# Patient Record
Sex: FEMALE | Race: WHITE | NOT HISPANIC OR LATINO | Employment: FULL TIME | URBAN - METROPOLITAN AREA
[De-identification: names, ages, dates, MRNs, and addresses within clinical notes are randomized per-mention and may not be internally consistent; named-entity substitution may affect disease eponyms.]

---
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
## 2018-04-02 ENCOUNTER — RADIANT APPOINTMENT (OUTPATIENT)
Dept: GENERAL RADIOLOGY | Facility: CLINIC | Age: 18
End: 2018-04-02
Attending: PHYSICIAN ASSISTANT
Payer: COMMERCIAL

## 2018-04-02 ENCOUNTER — OFFICE VISIT (OUTPATIENT)
Dept: PEDIATRICS | Facility: CLINIC | Age: 18
End: 2018-04-02
Payer: COMMERCIAL

## 2018-04-02 VITALS
BODY MASS INDEX: 35.26 KG/M2 | TEMPERATURE: 97.4 F | OXYGEN SATURATION: 100 % | RESPIRATION RATE: 20 BRPM | HEART RATE: 82 BPM | SYSTOLIC BLOOD PRESSURE: 119 MMHG | DIASTOLIC BLOOD PRESSURE: 76 MMHG | WEIGHT: 198 LBS

## 2018-04-02 DIAGNOSIS — R07.89 CHEST WALL PAIN: ICD-10-CM

## 2018-04-02 DIAGNOSIS — R07.89 CHEST WALL PAIN: Primary | ICD-10-CM

## 2018-04-02 PROCEDURE — 71046 X-RAY EXAM CHEST 2 VIEWS: CPT | Mod: FY

## 2018-04-02 PROCEDURE — 99213 OFFICE O/P EST LOW 20 MIN: CPT | Performed by: PHYSICIAN ASSISTANT

## 2018-04-02 ASSESSMENT — PAIN SCALES - GENERAL: PAINLEVEL: SEVERE PAIN (6)

## 2018-04-02 NOTE — PATIENT INSTRUCTIONS
Chest xray looks clear of pneumonia or consolidation on the right side.  No evidence of pneumothorax.  This is likely a chest wall pain and taking 600-800 mg of ibuprofen every 6-8 hours or Aleve 440 mg every 12 hours may help reduce the severity of discomfort.  Use ice or heat on the area if that is helpful.  Follow up if not improving in the next 7-10 days or if any worsening.      [facility]- Pediatric Department    If you have any questions regarding to your visit please contact:   Team [place]:   Clinic Hours Telephone Number   Dr. Juliet Brady APRN, CPNP  Anne Trejo PA-C, MS Christine Olsen, RN  Jenna Cazares,    7am - 7pm Mon - Thurs  7am - 5pm Fri [phone]    After hours and weekends, call [phone]   To make an appointment at any location anytime, please call [phone] or  [email or web address].   Pediatric Walk-in Clinic* 8:30am - 3pm  Mon- Fri    [facility]   8:00am - 7pm  Mon- Thurs  8:00am - 5:30 pm Friday  9am - 1pm Saturday [phone]   Urgent Care - [place]      Urgent [facility] [place], [place]       11pm-9pm Monday - Friday   9am-5pm Saturday - Sunday    5pm-9pm Monday - Friday  9am-5pm Saturday - Sunday [phone] - [place]      [phone] [facility]   *Pediatric Walk-In Clinic is available for children/adolescents age 0-21 for the following symptoms:  Cough/Cold symptoms   Rashes/Itchy Skin  Sore throat    Urinary tract infection  Diarrhea    Ringworm  Ear pain    Sinus infection  Fever     Pink eye       If your provider has ordered a CT, MRI, or ultrasound for you, please call to schedule:  Phillip bui, phone [phone], fax [phone]  [facility] radiology, [phone]    If you need a medication refill please contact your pharmacy.   Please allow 3 business days for your refills to be completed.  **For ADHD medication, patient will need a follow up clinic  "or Evisit at least every 3 months to obtain refills.**    Use [employer] (secure email communication and access to your chart) to send your primary care provider a message or make an appointment.  Ask someone on your Team how to sign up for [employer] or call the [employer] help line at [phone]  To view your child's test results online: Log into your own [employer] account, select your child's name from the tabs on the right hand side, select \"My medical record\" and select \"Test results\"  Do you have options for a visit without coming into the clinic?  [place] offers electronic visits (E-visits) and telephone visits for certain medical concerns as well as Zipnosis online.    E-visits via [employer]- generally incur a $35.00 fee.   Telephone visits- These are billed based on time spent (in 10-minute increments) on the phone with your provider.   5-10 minutes $30.00 fee   11-20 minutes $59.00 fee   21-30 minutes $85.00 fee  Zipnosis- $25.00 fee.  More information and link available on [email or web address] homepage.       "

## 2018-04-02 NOTE — PROGRESS NOTES
SUBJECTIVE:   Ximena Diaz is a 17 year old female who presents to clinic today  because of:    Chief Complaint   Patient presents with     URI     Health Maintenance     HPV, Menactra, DTAP        HPI  ENT/Cough Symptoms    Problem started: 1 days ago  Fever: no  Runny nose: YES  Congestion: no  Sore Throat: no  Cough: no  Eye discharge/redness:  no  Ear Pain: no  Wheeze: no   Sick contacts: None;  Strep exposure: None;  Therapies Tried: none  sharp pain in right side when she takes a deep breath wondering if she has an upper resp infection      Pain on the right side of her rib cage is causing her to have difficulty breathing.  She cannot take a large deep breath because of pain and discomfort.  She also has pain with regular breathing pattern.  Hurts to move around and touch that area.  She has a history of asthma as a younger child, but not for many years.  She has a runny nose.  No cough, no fever, no stomach pain.           ROS  Constitutional, eye, ENT, skin, respiratory, cardiac, and GI are normal except as otherwise noted.    PROBLEM LIST  Patient Active Problem List    Diagnosis Date Noted     Abdominal pain, left upper quadrant 02/15/2016     Priority: Medium     Non-intractable vomiting with nausea, vomiting of unspecified type 02/15/2016     Priority: Medium     Esophageal reflux 09/03/2015     Priority: Medium     Nut allergy 09/03/2015     Priority: Medium      MEDICATIONS  Current Outpatient Prescriptions   Medication Sig Dispense Refill     fexofenadine (ALLEGRA) 180 MG tablet Take 1 tablet (180 mg) by mouth daily 30 tablet 1      ALLERGIES  Allergies   Allergen Reactions     Nuts Anaphylaxis       Reviewed and updated as needed this visit by clinical staff  Tobacco  Allergies  Meds         Reviewed and updated as needed this visit by Provider       OBJECTIVE:     /76  Pulse 82  Temp 97.4  F (36.3  C) (Oral)  Resp 20  Wt 198 lb (89.8 kg)  SpO2 100%  BMI 35.26 kg/m2  No height on  file for this encounter.  97 %ile based on CDC 2-20 Years weight-for-age data using vitals from 4/2/2018.  97 %ile based on CDC 2-20 Years BMI-for-age data using weight from 4/2/2018 and height from 2/15/2016.  No height on file for this encounter.    GENERAL: Active, alert, in no acute distress.  SKIN: Clear. No significant rash, abnormal pigmentation or lesions  HEAD: Normocephalic.  EYES:  No discharge or erythema. Normal pupils and EOM.  RIGHT EAR: normal: no effusions, no erythema, normal landmarks  LEFT EAR: normal: no effusions, no erythema, normal landmarks  NOSE: Normal without discharge.  MOUTH/THROAT: tonsils 4+, no erythema or exudate  LYMPH NODES: No adenopathy  LUNGS: Clear. No rales, rhonchi, wheezing or retractions; pain with palpation of lower right rib margin  HEART: Regular rhythm. Normal S1/S2. No murmurs.  ABDOMEN: Soft, non-tender, not distended, no masses or hepatosplenomegaly. Bowel sounds normal.     DIAGNOSTICS: Chest x-ray:  Normal per my reading    ASSESSMENT/PLAN:   1. Chest wall pain  Discussed using ibuprofen or naproxen for discomfort.  Monitor closely and follow up if ongoing or worsening in the next 7-10 days.  - XR Chest 2 Views; Future    FOLLOW UP: If not improving or if worsening    Anne Trejo PA-C

## 2018-04-02 NOTE — NURSING NOTE
"Chief Complaint   Patient presents with     URI     Health Maintenance     HPV, Menactra, DTAP       Initial /76  Pulse 82  Temp 97.4  F (36.3  C) (Oral)  Resp 20  Wt 198 lb (89.8 kg)  SpO2 100%  BMI 35.26 kg/m2 Estimated body mass index is 35.26 kg/(m^2) as calculated from the following:    Height as of 2/15/16: 5' 2.84\" (1.596 m).    Weight as of this encounter: 198 lb (89.8 kg).  Medication Reconciliation: complete  Allergies: yes  Health Maintenance due:   Health Maintenance Due   Topic Date Due     HPV IMMUNIZATION (1 of 3 - Female 3 Dose Series) 10/06/2009     PEDS DTAP/TDAP (3 - Td) 04/12/2011     PEDS MCV4 (2 of 2) 10/06/2014     Health Maintenance pended:  Yes   Vitals required taken:  Yes   Tobacco use reviewed:  Yes   Social history reviewed: Yes   Drug use reviewed:  Yes   LMP reviewed if required:   No  PHQ 2 done if over 18 years:  No    Emani Villagran MA April 2, 20182:57 PM  "

## 2018-04-02 NOTE — MR AVS SNAPSHOT
After Visit Summary   4/2/2018    Ximena Diaz    MRN: [id number]           Patient Information     Date Of Birth          2000        Visit Information        Provider Department      4/2/2018 3:00 PM Anne Trejo PA-C [facility]        Today's Diagnoses     Chest wall pain    -  1      Care Instructions    Chest xray looks clear of pneumonia or consolidation on the right side.  No evidence of pneumothorax.  This is likely a chest wall pain and taking 600-800 mg of ibuprofen every 6-8 hours or Aleve 440 mg every 12 hours may help reduce the severity of discomfort.  Use ice or heat on the area if that is helpful.  Follow up if not improving in the next 7-10 days or if any worsening.      [facility]- Pediatric Department    If you have any questions regarding to your visit please contact:   Team Pacheco:   Clinic Hours Telephone Number   JESICA Hunter, CELE Trejo PA-C, BASSEM Mckeon,    7am - 7pm Mon - Thurs 7am - 5pm Fri [phone]    After hours and weekends, call [phone]   To make an appointment at any location anytime, please call [phone] or  [email or web address].   Pediatric Walk-in Clinic* 8:30am - 3pm  Mon- Fri    [facility]   8:00am - 7pm  Mon- Thurs  8:00am - 5:30 pm Friday  9am - 1pm Saturday [phone]   Urgent Care - [place]      Urgent Care - [place]       11pm-9pm Monday - Friday   9am-5pm Saturday - Sunday    5pm-9pm Monday - Friday  9am-5pm Saturday - Sunday [phone] - [place]      [phone] - [place]   *Pediatric Walk-In Clinic is available for children/adolescents age 0-21 for the following symptoms:  Cough/Cold symptoms   Rashes/Itchy Skin  Sore throat    Urinary tract infection  Diarrhea    Ringworm  Ear pain    Sinus infection  Fever     Pink eye       If your provider has ordered a CT, MRI, or ultrasound for  "you, please call to schedule:  [facility], phone [phone], fax [phone]  [facility] radiology, [phone]    If you need a medication refill please contact your pharmacy.   Please allow 3 business days for your refills to be completed.  **For ADHD medication, patient will need a follow up clinic or Evisit at least every 3 months to obtain refills.**    Use Educerus (secure email communication and access to your chart) to send your primary care provider a message or make an appointment.  Ask someone on your Team how to sign up for Educerus or call the Educerus help line at [phone]  To view your child's test results online: Log into your own Educerus account, select your child's name from the tabs on the right hand side, select \"My medical record\" and select \"Test results\"  Do you have options for a visit without coming into the clinic?  [facility] offers electronic visits (E-visits) and telephone visits for certain medical concerns as well as Zipnosis online.    E-visits via Educerus- generally incur a $35.00 fee.   Telephone visits- These are billed based on time spent (in 10-minute increments) on the phone with your provider.   5-10 minutes $30.00 fee   11-20 minutes $59.00 fee   21-30 minutes $85.00 fee  Zipnosis- $25.00 fee.  More information and link available on [email or web address] homepage.               Follow-ups after your visit        Who to contact     If you have questions or need follow up information about today's clinic visit or your schedule please contact [facility] directly at [phone].  Normal or non-critical lab and imaging results will be communicated to you by MyChart, letter or phone within 4 business days after the clinic has received the results. If you do not hear from us within 7 days, please contact the clinic through [employer] Apparelhart or phone. If you have a critical or abnormal lab result, we will notify you by phone as soon as " "possible.  Submit refill requests through Baihe or call your pharmacy and they will forward the refill request to us. Please allow 3 business days for your refill to be completed.          Additional Information About Your Visit        CPUsageharTreatspace Information     Baihe lets you send messages to your doctor, view your test results, renew your prescriptions, schedule appointments and more. To sign up, go to [email or web address] . Click on \"Log in\" on the left side of the screen, which will take you to the Welcome page. Then click on \"Sign up Now\" on the right side of the page.     You will be asked to enter the access code listed below, as well as some personal information. Please follow the directions to create your username and password.     Your access code is: [email or web address]  Expires: 2018  3:37 PM     Your access code will  in 90 days. If you need help or a new code, please call your [facility] clinic or [phone].        Care EveryWhere ID     This is your Care EveryWhere ID. This could be used by other organizations to access your [facility] medical records  [id number]        Your Vitals Were     Pulse Temperature Respirations Pulse Oximetry BMI (Body Mass Index)       82 97.4  F (36.3  C) (Oral) 20 100% 35.26 kg/m2        Blood Pressure from Last 3 Encounters:   18 119/76   17 109/71   16 103/67    Weight from Last 3 Encounters:   18 198 lb (89.8 kg) (97 %)*   17 176 lb (79.8 kg) (94 %)*   02/15/16 193 lb 12.6 oz (87.9 kg) (97 %)*     * Growth percentiles are based on CDC 2-20 Years data.               Primary Care Provider Office Phone # Fax #    Curtis Holley PA-C [phone] [address]  [facility] [address]        Equal Access to Services     BENITEZ PAZ AH: Glenn Webb, froylan vazquez, javier wardn ah. [facility] [phone].    ATENCIÓN: Si jerry brito a underwood " disposición servicios gratuitos de asistencia lingüística. Tamraa mosqueda [phone].    We comply with applicable federal civil rights laws and [place] laws. We do not discriminate on the basis of race, color, national origin, age, disability, sex, sexual orientation, or gender identity.            Thank you!     Thank you for choosing [facility]  for your care. Our goal is always to provide you with excellent care. Hearing back from our patients is one way we can continue to improve our services. Please take a few minutes to complete the written survey that you may receive in the mail after your visit with us. Thank you!             Your Updated Medication List - Protect others around you: Learn how to safely use, store and throw away your medicines at www.disposemymeds.org.          This list is accurate as of 4/2/18  3:37 PM.  Always use your most recent med list.                   Brand Name Dispense Instructions for use Diagnosis    fexofenadine 180 MG tablet    ALLEGRA    30 tablet    Take 1 tablet (180 mg) by mouth daily    Seasonal allergic rhinitis

## 2018-09-19 ENCOUNTER — TRANSFERRED RECORDS (OUTPATIENT)
Dept: HEALTH INFORMATION MANAGEMENT | Facility: CLINIC | Age: 18
End: 2018-09-19

## 2018-09-24 ENCOUNTER — OFFICE VISIT (OUTPATIENT)
Dept: PEDIATRICS | Facility: CLINIC | Age: 18
End: 2018-09-24
Payer: COMMERCIAL

## 2018-09-24 VITALS
SYSTOLIC BLOOD PRESSURE: 104 MMHG | TEMPERATURE: 97.1 F | DIASTOLIC BLOOD PRESSURE: 67 MMHG | WEIGHT: 190 LBS | OXYGEN SATURATION: 98 % | HEIGHT: 64 IN | BODY MASS INDEX: 32.44 KG/M2 | RESPIRATION RATE: 16 BRPM | HEART RATE: 95 BPM

## 2018-09-24 DIAGNOSIS — J03.01 ACUTE RECURRENT STREPTOCOCCAL TONSILLITIS: Primary | ICD-10-CM

## 2018-09-24 PROCEDURE — 99213 OFFICE O/P EST LOW 20 MIN: CPT | Performed by: PEDIATRICS

## 2018-09-24 NOTE — MR AVS SNAPSHOT
After Visit Summary   9/24/2018    Ximena Diaz    MRN: [id number]           Patient Information     Date Of Birth          2000        Visit Information        Provider Department      9/24/2018 10:45 AM Juliet Huynh MD [facility]        Today's Diagnoses     Acute recurrent streptococcal tonsillitis    -  1       Follow-ups after your visit        Additional Services     OTOLARYNGOLOGY REFERRAL       Your provider has referred you to: [facility]: [facility] [phone]  [email or web address]    Please be aware that coverage of these services is subject to the terms and limitations of your health insurance plan.  Call member services at your health plan with any benefit or coverage questions.      Please bring the following with you to your appointment:    (1) Any X-Rays, CTs or MRIs which have been performed.  Contact the facility where they were done to arrange for  prior to your scheduled appointment.   (2) List of current medications  (3) This referral request   (4) Any documents/labs given to you for this referral                  Follow-up notes from your care team     Return for Physical Exam.      Your next 10 appointments already scheduled     Sep 26, 2018 10:30 AM CDT   Office Visit with JESICA Good CNM   [facility] ([facility])    [address] [facility] [address] [phone]           Bring a current list of meds and any records pertaining to this visit. For Physicals, please bring immunization records and any forms needing to be filled out. Please arrive 10 minutes early to complete paperwork.            Oct 03, 2018 10:30 AM CDT   Office Visit with JESICA Good CNM   [facility] ([facility])    [address] [facility] [address] [phone]           Bring a current list of meds and any records pertaining to this  "visit. For Physicals, please bring immunization records and any forms needing to be filled out. Please arrive 10 minutes early to complete paperwork.              Who to contact     If you have questions or need follow up information about today's clinic visit or your schedule please contact [facility] directly at [phone].  Normal or non-critical lab and imaging results will be communicated to you by MyChart, letter or phone within 4 business days after the clinic has received the results. If you do not hear from us within 7 days, please contact the clinic through Drivyhart or phone. If you have a critical or abnormal lab result, we will notify you by phone as soon as possible.  Submit refill requests through WonderHowTo or call your pharmacy and they will forward the refill request to us. Please allow 3 business days for your refill to be completed.          Additional Information About Your Visit        MyChart Information     WonderHowTo lets you send messages to your doctor, view your test results, renew your prescriptions, schedule appointments and more. To sign up, go to [email or web address] . Click on \"Log in\" on the left side of the screen, which will take you to the Welcome page. Then click on \"Sign up Now\" on the right side of the page.     You will be asked to enter the access code listed below, as well as some personal information. Please follow the directions to create your username and password.     Your access code is: [email or web address]  Expires: 2018 11:31 AM     Your access code will  in 90 days. If you need help or a new code, please call your [facility] or [phone].        Care EveryWhere ID     This is your Care EveryWhere ID. This could be used by other organizations to access your [place] medical records  [id number]        Your Vitals Were     Pulse Temperature Respirations Height Pulse Oximetry BMI (Body Mass Index)    95 97.1  F (36.2  C) (Oral) 16 5' 3.75\" " (1.619 m) 98% 32.87 kg/m2       Blood Pressure from Last 3 Encounters:   09/24/18 104/67   04/02/18 119/76   07/19/17 109/71    Weight from Last 3 Encounters:   09/24/18 190 lb (86.2 kg) (96 %)*   04/02/18 198 lb (89.8 kg) (97 %)*   07/19/17 176 lb (79.8 kg) (94 %)*     * Growth percentiles are based on [facility] 2-20 Years data.              We Performed the Following     OTOLARYNGOLOGY REFERRAL        Primary Care Provider Office Phone # Fax #    Curtis Holley, LEXX [phone] [address] [facility] [address]        Equal Access to Services     BENITEZ PAZ : Hadii humberto peraltao Soellen, waaxda luqadaha, qaybta kaalmada adeegyada, javier bear . So [facility] [phone].    ATENCIÓN: Si habla español, tiene a underwood disposición servicios gratuitos de asistencia lingüística. Llame al [phone].    We comply with applicable federal civil rights laws and [place] laws. We do not discriminate on the basis of race, color, national origin, age, disability, sex, sexual orientation, or gender identity.            Thank you!     Thank you for choosing [facility]  for your care. Our goal is always to provide you with excellent care. Hearing back from our patients is one way we can continue to improve our services. Please take a few minutes to complete the written survey that you may receive in the mail after your visit with us. Thank you!             Your Updated Medication List - Protect others around you: Learn how to safely use, store and throw away your medicines at www.disposemymeds.org.          This list is accurate as of 9/24/18 11:31 AM.  Always use your most recent med list.                   Brand Name Dispense Instructions for use Diagnosis    fexofenadine 180 MG tablet    ALLEGRA    30 tablet    Take 1 tablet (180 mg) by mouth daily    Seasonal allergic rhinitis

## 2018-09-24 NOTE — PROGRESS NOTES
"SUBJECTIVE:   Ximena Diaz is a 17 year old female who presents to clinic today with self because of:    Chief Complaint   Patient presents with     Referral     Health Maintenance        HPI  Concerns: Pt here for referral to ENT since pt has  recurrent strep infections, snoring and enlarged tonsils . Pt has had around 5 episodes of strep throat within last 12 months, they were dx'd at [facility]. Last one was 2 days ago, pt is on cephalexin currently.           ROS  Constitutional, eye, ENT, skin, respiratory, cardiac, and GI are normal except as otherwise noted.    PROBLEM LIST  Patient Active Problem List    Diagnosis Date Noted     Abdominal pain, left upper quadrant 02/15/2016     Priority: Medium     Non-intractable vomiting with nausea, vomiting of unspecified type 02/15/2016     Priority: Medium     Esophageal reflux 09/03/2015     Priority: Medium     Nut allergy 09/03/2015     Priority: Medium      MEDICATIONS  Current Outpatient Prescriptions   Medication Sig Dispense Refill     fexofenadine (ALLEGRA) 180 MG tablet Take 1 tablet (180 mg) by mouth daily 30 tablet 1      ALLERGIES  Allergies   Allergen Reactions     Nuts Anaphylaxis       Reviewed and updated as needed this visit by clinical staff  Tobacco  Allergies  Meds  Med Hx  Surg Hx  Fam Hx  Soc Hx        Reviewed and updated as needed this visit by Provider       OBJECTIVE:     /67  Pulse 95  Temp 97.1  F (36.2  C) (Oral)  Resp 16  Ht 5' 3.75\" (1.619 m)  Wt 190 lb (86.2 kg)  SpO2 98%  BMI 32.87 kg/m2  41 %ile based on CDC 2-20 Years stature-for-age data using vitals from 9/24/2018.  96 %ile based on CDC 2-20 Years weight-for-age data using vitals from 9/24/2018.  96 %ile based on CDC 2-20 Years BMI-for-age data using vitals from 9/24/2018.  Blood pressure percentiles are 15.8 % systolic and 56.3 % diastolic based on the August 2017 AAP Clinical Practice Guideline.    GENERAL: Active, alert, in no acute distress.  SKIN: " Clear. No significant rash, abnormal pigmentation or lesions  HEAD: Normocephalic.  EYES:  No discharge or erythema. Normal pupils and EOM.  EARS: Normal canals. Tympanic membranes are normal; gray and translucent.  NOSE: clear rhinorrhea  MOUTH/THROAT: tonsillar hypertrophy, 3+  NECK: Supple, no masses.  LYMPH NODES: No adenopathy  LUNGS: Clear. No rales, rhonchi, wheezing or retractions  HEART: Regular rhythm. Normal S1/S2. No murmurs.  ABDOMEN: Soft, non-tender, not distended, no masses or hepatosplenomegaly. Bowel sounds normal.     DIAGNOSTICS: None    ASSESSMENT/PLAN:   Recurrent strep tonsillitis  Referral to ENT  Incomplete immunization records  DREW to obtain immunization records from [facility]    FOLLOW UP: next preventive care visit    Juliet Huynh MD

## 2018-09-24 NOTE — PROGRESS NOTES
"SUBJECTIVE:   Ximena Diaz is a 17 year old female who presents to clinic today with {Side:5061} because of:    Chief Complaint   Patient presents with     Pharyngitis     Health Maintenance        HPI  ENT/Cough Symptoms    Problem started: {NUMBER1-12:105498} {DAYS:100229} ago  Fever: {.:097269::\"no\"}  Runny nose: {.:658870::\"no\"}  Congestion: {.:669121::\"no\"}  Sore Throat: {.:345776::\"no\"}  Cough: {.:588189::\"no\"}  Eye discharge/redness:  {.:816788::\"no\"}  Ear Pain: {.:294893::\"no\"}  Wheeze: {.:778357::\"no\"}   Sick contacts: {Contacts:103023}  Strep exposure: {Contacts:872701}  Therapies Tried: ***    {roomer to stop here, delete this reminder}  ***       {Additional problems for provider to add:055961}     ROS  {ROS Choices:830099}    PROBLEM LIST  Patient Active Problem List    Diagnosis Date Noted     Abdominal pain, left upper quadrant 02/15/2016     Priority: Medium     Non-intractable vomiting with nausea, vomiting of unspecified type 02/15/2016     Priority: Medium     Esophageal reflux 09/03/2015     Priority: Medium     Nut allergy 09/03/2015     Priority: Medium      MEDICATIONS  Current Outpatient Prescriptions   Medication Sig Dispense Refill     fexofenadine (ALLEGRA) 180 MG tablet Take 1 tablet (180 mg) by mouth daily 30 tablet 1      ALLERGIES  Allergies   Allergen Reactions     Nuts Anaphylaxis       Reviewed and updated as needed this visit by clinical staff         Reviewed and updated as needed this visit by Provider       OBJECTIVE:   {Note vitals & weights}  There were no vitals taken for this visit.  No height on file for this encounter.  No weight on file for this encounter.  No height and weight on file for this encounter.  No blood pressure reading on file for this encounter.    {Exam choices:812502}    DIAGNOSTICS: {Diagnostics:112769::\"None\"}    ASSESSMENT/PLAN:   {Diagnosis Options:457790}    FOLLOW UP: { :856214}    Juliet Huynh MD     "

## 2018-09-26 ENCOUNTER — OFFICE VISIT (OUTPATIENT)
Dept: OBGYN | Facility: CLINIC | Age: 18
End: 2018-09-26
Payer: COMMERCIAL

## 2018-09-26 VITALS
DIASTOLIC BLOOD PRESSURE: 76 MMHG | HEART RATE: 76 BPM | WEIGHT: 189 LBS | BODY MASS INDEX: 32.7 KG/M2 | SYSTOLIC BLOOD PRESSURE: 117 MMHG

## 2018-09-26 DIAGNOSIS — Z11.3 ROUTINE SCREENING FOR STI (SEXUALLY TRANSMITTED INFECTION): ICD-10-CM

## 2018-09-26 DIAGNOSIS — Z30.017 NEXPLANON INSERTION: Primary | ICD-10-CM

## 2018-09-26 DIAGNOSIS — Z97.5 NEXPLANON IN PLACE: ICD-10-CM

## 2018-09-26 PROCEDURE — 11981 INSERTION DRUG DLVR IMPLANT: CPT | Performed by: ADVANCED PRACTICE MIDWIFE

## 2018-09-26 NOTE — PROGRESS NOTES
NEXPLANON INSERTION PROCEDURE    Ximena Diaz is a 17 year old No obstetric history on file. who presents for Nexplanon insertion. Patient's last menstrual period was 09/01/2018 (exact date).  The patient is currently using condoms  for contraception.     Tests:   NONE  Discussed risks of bleeding and infection with placement and the insertion procedure. Also discussed the possibility of irregular bleeding for 3-6 months and then often cessation of menses but possibility of continued abnormal bleeding. Small risk of migration of the Nexplanon or difficulty removing the Nexplanon. We also discussed possible side effects of weight gain, skin or hair changes, alterations in mood and increase in headaches.  Lasts for 3 years at which time she could have this one removed and another replaced. All questions answered and consent form signed.   Preprocedure medications: 1% plain lidocaine, 1-2 ml  Nexplanon Lot # [id number]  [id number]  [id number]       Exp date:11/2020   NDC 6477-2878-19    All equipment required was ready and available.  Patients allergies were confirmed.  The patient was placed in the supine position with her right (non-dominant) arm flexed at the elbow, externally rotated, and placed with her wrist parallel to her ear.  The insertion site was identified 6-8 cm above the elbow crease at the inner aspect overlying the bicepital groove.  The insertion site was marked with a sterile marker. The direction of insertion was also indicated with a jesse 6-8 cm proximal in the bicepital groove.  The insertion area was cleaned with betadine swabs and anesthetized with 3 cc of 1% lidocaine without epinephrine.  The Nexplanon was removed from its blister.  The needle shield was removed.  Counter-traction was applied to the skin at the marked needle insertion site.  The tip of the needle was inserted at the site, beveled side up, at a slight angle.  The applicator was then lowered to a horizontal position.  The  needle was inserted to its full length, keeping the needle parallel to the surface of the skin and the skin tented.   The cannula was retracted against the obturator.  The 4 cm mee was palpated under the skin.  The patient also palpated the mee.  A pressure bandage was applied with sterile gauze. The patient was instructed to remove the bangage in several hours and replace with a band-aid.    The user card was filled out and given to the patient to keep.    PLAN:   The patient was asked to contact the clinic for any fever/chills/severe pelvic or abdominal pain or heavy bleeding.     FOLLOW-UP:  She was asked to follow up for any problems.

## 2018-09-26 NOTE — MR AVS SNAPSHOT
After Visit Summary   9/26/2018    Ximena Diaz    MRN: [id number]           Patient Information     Date Of Birth          2000        Visit Information        Provider Department      9/26/2018 10:30 AM Swapna Iqbal APRN CNM [facility]        Today's Diagnoses     Nexplanon insertion    -  1    Nexplanon in place        Routine screening for STI (sexually transmitted infection)          Care Instructions    Leave the pressure dressing in place for 4-6 hours.  If you feel the dressing is too tight loosen it or remove it completely.  Leave the Band-Aid in place for 24 hours.  Change it if wet.   Leave the steri strip in place until it falls off by itself.  Call the clinic with fever, chills or bleeding from the site.   Use a back up method of birth control such as condoms or abstain from intercourse for 7 days.   Call the clinic for bleeding that is heavier than a normal period for you or if you experience severe pelvic pain.      What Nexplanon Users May Expect    For appropiate patients, Nexplanon is well tolerated and has a low early-removal rate.    Insertion site complications, such as prolonged pain or infection, are rare. Removal is occasionally difficult, and rarely requires a surgical procedure in the operating room.    Menstrual changes are common with Nexplanon. Bleeding may become more or less frequent or heavy, or absent. The bleeding pattern after the first three months is predictive of future bleeding, but the pattern may change at any time. Average bleeding is 18 days over 3 months. Over 50% of women experience rare over absent bleeding over the two year period, while 25% experience frequent or prolonged bleeding.    In clinical studies, users gained 3.7 pounds over two years. It is unknown what portion of this weight gain is related to Nexplanon    Women with a history of depressed mood may have worsening on Nexplanon, and may need to have the device  removed.    Return to baseline ovulation patterns is seen 7-14 days after removal of Nexplanon.    Rarely, headaches and acne have also let to device removal.    Nexplanon may be less effective in women weight more than 130% of their ideal body weight.    Nexplanon does not protect against HIV or STDs.    Please call [facility] at [phone] if you have questions or concerns.    For more complete information:  [email or web address]              Follow-ups after your visit        Your next 10 appointments already scheduled     Sep 28, 2018 10:15 AM CDT   [facility]    [address] [facility] [address] [phone]            Oct 03, 2018 10:30 AM CDT   [facility]    [address] [facility] [address] [phone]           Bring a current list of meds and any records pertaining to this visit. For Physicals, please bring immunization records and any forms needing to be filled out. Please arrive 10 minutes early to complete paperwork.              Future tests that were ordered for you today     Open Future Orders        Priority Expected Expires Ordered    Neisseria gonorrhoeae PCR Routine  9/26/2019 9/26/2018    Chlamydia trachomatis PCR Routine  9/26/2019 9/26/2018            Who to contact     If you have questions or need follow up information about today's clinic visit or your schedule please contact [facility] directly at [phone].  Normal or non-critical lab and imaging results will be communicated to you by MyChart, letter or phone within 4 business days after the clinic has received the results. If you do not hear from us within 7 days, please contact the clinic through MyChart or phone. If you have a critical or abnormal lab result, we will  "notify you by phone as soon as possible.  Submit refill requests through [employer] or call your pharmacy and they will forward the refill request to us. Please allow 3 business days for your refill to be completed.          Additional Information About Your Visit        [employer] Information     [employer] lets you send messages to your doctor, view your test results, renew your prescriptions, schedule appointments and more. To sign up, go to [email or web address] . Click on \"Log in\" on the left side of the screen, which will take you to the Welcome page. Then click on \"Sign up Now\" on the right side of the page.     You will be asked to enter the access code listed below, as well as some personal information. Please follow the directions to create your username and password.     Your access code is: [email or web address]  Expires: 2018 11:31 AM     Your access code will  in 90 days. If you need help or a new code, please call your [facility] clinic or [phone].        Care EveryWhere ID     This is your Care EveryWhere ID. This could be used by other organizations to access your [facility] medical records  [id number]        Your Vitals Were     Pulse Last Period BMI (Body Mass Index)             76 2018 (Exact Date) 32.7 kg/m2          Blood Pressure from Last 3 Encounters:   18 117/76   18 104/67   18 119/76    Weight from Last 3 Encounters:   18 189 lb (85.7 kg) (96 %)*   18 190 lb (86.2 kg) (96 %)*   18 198 lb (89.8 kg) (97 %)*     * Growth percentiles are based on CDC 2-20 Years data.              We Performed the Following     ETONOGESTREL IMPLANT SYSTEM     INSERTION NON-BIODEGRADABLE DRUG DELIVERY IMPLANT          Today's Medication Changes          These changes are accurate as of 18 11:09 AM.  If you have any questions, ask your nurse or doctor.               Start taking these medicines.        Dose/Directions    etonogestrel 68 MG Impl   Commonly known as:  " IMPLANON/NEXPLANON   Used for:  Nexplanon insertion, Nexplanon in place   Started by:  Swapna Iqbal, JESICA CNJORDAN        Dose:  1 each   1 each (68 mg) by Subdermal route continuous   Refills:  0            Where to get your medicines      Some of these will need a paper prescription and others can be bought over the counter.  Ask your nurse if you have questions.     You don't need a prescription for these medications     etonogestrel 68 MG Impl                Primary Care Provider Office Phone # Fax #    Curtis Holley PA-C [phone] [address] [facility] [address]        Equal Access to Services     [facility] Picture [facility] Television HospitalFRANCISCO JAVIER : Hadii aad mary hadasho Soomaali, waaxda luqadaha, qaybta kaalmada adeegyada, javier bear . So [facility] [phone].    ATENCIÓN: Si habla español, tiene a underwood disposición servicios gratuitos de asistencia lingüística. Llame al [phone].    We comply with applicable federal civil rights laws and [place] laws. We do not discriminate on the basis of race, color, national origin, age, disability, sex, sexual orientation, or gender identity.            Thank you!     Thank you for choosing [facility]  for your care. Our goal is always to provide you with excellent care. Hearing back from our patients is one way we can continue to improve our services. Please take a few minutes to complete the written survey that you may receive in the mail after your visit with us. Thank you!             Your Updated Medication List - Protect others around you: Learn how to safely use, store and throw away your medicines at www.disposemymeds.org.          This list is accurate as of 9/26/18 11:09 AM.  Always use your most recent med list.                   Brand Name Dispense Instructions for use Diagnosis    etonogestrel 68 MG Impl    IMPLANON/NEXPLANON     1 each (68 mg) by Subdermal route continuous    Nexplanon insertion, Nexplanon in place        fexofenadine 180 MG tablet    ALLEGRA    30 tablet    Take 1 tablet (180 mg) by mouth daily    Seasonal allergic rhinitis

## 2018-09-26 NOTE — PATIENT INSTRUCTIONS
Leave the pressure dressing in place for 4-6 hours.  If you feel the dressing is too tight loosen it or remove it completely.  Leave the Band-Aid in place for 24 hours.  Change it if wet.   Leave the steri strip in place until it falls off by itself.  Call the clinic with fever, chills or bleeding from the site.   Use a back up method of birth control such as condoms or abstain from intercourse for 7 days.   Call the clinic for bleeding that is heavier than a normal period for you or if you experience severe pelvic pain.      What Nexplanon Users May Expect    For appropiate patients, Nexplanon is well tolerated and has a low early-removal rate.    Insertion site complications, such as prolonged pain or infection, are rare. Removal is occasionally difficult, and rarely requires a surgical procedure in the operating room.    Menstrual changes are common with Nexplanon. Bleeding may become more or less frequent or heavy, or absent. The bleeding pattern after the first three months is predictive of future bleeding, but the pattern may change at any time. Average bleeding is 18 days over 3 months. Over 50% of women experience rare over absent bleeding over the two year period, while 25% experience frequent or prolonged bleeding.    In clinical studies, users gained 3.7 pounds over two years. It is unknown what portion of this weight gain is related to Nexplanon    Women with a history of depressed mood may have worsening on Nexplanon, and may need to have the device removed.    Return to baseline ovulation patterns is seen 7-14 days after removal of Nexplanon.    Rarely, headaches and acne have also let to device removal.    Nexplanon may be less effective in women weight more than 130% of their ideal body weight.    Nexplanon does not protect against HIV or STDs.    Please call [facility] at [phone] if you have questions or concerns.    For more complete  information:  [email or web address]

## 2018-09-26 NOTE — NURSING NOTE
"Chief Complaint   Patient presents with     Contraception     Nexplanon consult       Initial /76 (BP Location: Right arm, Patient Position: Sitting, Cuff Size: Adult Large)  Pulse 76  Wt 189 lb (85.7 kg)  LMP 09/01/2018 (Exact Date)  BMI 32.7 kg/m2 Estimated body mass index is 32.7 kg/(m^2) as calculated from the following:    Height as of 9/24/18: 5' 3.75\" (1.619 m).    Weight as of this encounter: 189 lb (85.7 kg).  Medication Reconciliation: complete    Solange Betancourt CMA    "

## 2018-10-03 ENCOUNTER — TRANSFERRED RECORDS (OUTPATIENT)
Dept: HEALTH INFORMATION MANAGEMENT | Facility: CLINIC | Age: 18
End: 2018-10-03

## 2019-03-13 ENCOUNTER — OFFICE VISIT (OUTPATIENT)
Dept: OTOLARYNGOLOGY | Facility: CLINIC | Age: 19
End: 2019-03-13
Payer: COMMERCIAL

## 2019-03-13 VITALS
DIASTOLIC BLOOD PRESSURE: 74 MMHG | HEART RATE: 90 BPM | HEIGHT: 63 IN | RESPIRATION RATE: 16 BRPM | SYSTOLIC BLOOD PRESSURE: 123 MMHG | WEIGHT: 205 LBS | BODY MASS INDEX: 36.32 KG/M2 | OXYGEN SATURATION: 100 %

## 2019-03-13 DIAGNOSIS — J35.01 CHRONIC TONSILLITIS: ICD-10-CM

## 2019-03-13 DIAGNOSIS — J03.01 ACUTE RECURRENT STREPTOCOCCAL TONSILLITIS: Primary | ICD-10-CM

## 2019-03-13 PROCEDURE — 99204 OFFICE O/P NEW MOD 45 MIN: CPT | Performed by: OTOLARYNGOLOGY

## 2019-03-13 ASSESSMENT — MIFFLIN-ST. JEOR: SCORE: 1669

## 2019-03-13 NOTE — PATIENT INSTRUCTIONS
General Scheduling Information  To schedule your CT/MRI scan, please contact [place] [facility] at [phone]   [address] [place] [address]    To schedule your Surgery, please contact our Specialty Schedulers at [phone]    ENT Clinic Locations Clinic Hours Telephone Number     [facility]  [address] [place] [address]   Tuesday:       8:00am -- 4:00pm    Wednesday:  8:00am - 4:00pm   To schedule an appointment with   Dr. Travis,   please contact our   Specialty Scheduling Department at:     [phone]       [facility]  [address] [place] [address]   Friday:          8:00am - 4:00pm         Urgent Care Locations Clinic Hours Telephone Numbers     [facility]y  [address] [place] [address]     Monday-Friday:     11:00pm - 9:00pm    Saturday-Sunday:  9:00am - 5:00pm   [phone]     [facility] Yen  [address] [place] [address]     Monday-Friday:      5:00pm - 9:00pm     Saturday-Sunday:  9:00am - 5:00pm   [phone]

## 2019-03-13 NOTE — LETTER
3/13/2019         RE: Ximena Diaz  [address] [facility] [address]        Dear Colleague,    Thank you for referring your patient, Ximena Diaz, to the [facility]. Please see a copy of my visit note below.    Chief Complaint - tonsillitis    History of Present Illness - Ximena Diaz is a 18 year old female with recurrent acute tonsillitis. The patient describes bouts of significant sore throat with swelling of the tonsils. This happens many times per year, and has been going on for many  years. The patient has had positive strep tests in the past few years.  In between acute flairs the patient notes chronic sore throat.  Also noted is snoring, no apnea. No personal or family history of bleeding disorders.    Past Medical History -   Patient Active Problem List   Diagnosis     Esophageal reflux     Nut allergy     Abdominal pain, left upper quadrant     Non-intractable vomiting with nausea, vomiting of unspecified type     Nexplanon in place       Current Medications -   Current Outpatient Medications:      etonogestrel (IMPLANON/NEXPLANON) 68 MG IMPL, 1 each (68 mg) by Subdermal route continuous, Disp: , Rfl: 0     fexofenadine (ALLEGRA) 180 MG tablet, Take 1 tablet (180 mg) by mouth daily, Disp: 30 tablet, Rfl: 1    Allergies -   Allergies   Allergen Reactions     Nuts Anaphylaxis       Social History -   Social History     Socioeconomic History     Marital status: Single     Spouse name: Not on file     Number of children: Not on file     Years of education: Not on file     Highest education level: Not on file   Occupational History     Not on file   Social Needs     Financial resource strain: Not on file     Food insecurity:     Worry: Not on file     Inability: Not on file     Transportation needs:     Medical: Not on file     Non-medical: Not on file   Tobacco Use     Smoking status: Never Smoker     Smokeless tobacco: Never Used   Substance and Sexual Activity      "Alcohol use: No     Drug use: No     Sexual activity: Yes     Partners: Male     Birth control/protection: Condom   Lifestyle     Physical activity:     Days per week: Not on file     Minutes per session: Not on file     Stress: Not on file   Relationships     Social connections:     Talks on phone: Not on file     Gets together: Not on file     Attends Judaism service: Not on file     Active member of club or organization: Not on file     Attends meetings of clubs or organizations: Not on file     Relationship status: Not on file     Intimate partner violence:     Fear of current or ex partner: Not on file     Emotionally abused: Not on file     Physically abused: Not on file     Forced sexual activity: Not on file   Other Topics Concern     Parent/sibling w/ CABG, MI or angioplasty before 65F 55M? Not Asked   Social History Narrative     Not on file       Family History - see Hpi    Review of Systems - As per HPI and PMHx, no ear infections, otherwise 10+ comprehensive system review is negative.    Physical Exam  /74   Pulse 90   Resp 16   Ht 1.6 m (5' 3\")   Wt 93 kg (205 lb)   SpO2 100%   BMI 36.31 kg/m     General - The patient is in no distress.  Alert and oriented x3, answers questions and cooperates with examination appropriately.   Voice and Breathing - The patient was breathing comfortably without the use of accessory muscles. There was no wheezing, stridor, or stertor.  The patients voice was clear and strong.  Eyes - Extraocular movements intact. Sclera were not icteric or injected, conjunctiva were pink and moist.  Neurologic - Cranial nerves II-XII are grossly intact. Specifically, the facial nerve is intact, House-Brackmann grade 1 of 6.   Nose - No significant external deformity.  Nasal mucosa is pink and moist with no abnormal mucus.  The septum was midline, turbinates are of normal size and position.  No polyps, masses, or purulence.  Mouth - Examination of the oral cavity showed pink, " healthy oral mucosa. No lesions or ulcerations noted.  The tongue was mobile and protrudes midline.  Oropharynx - The walls of the oropharynx were smooth, pink, moist, symmetric, and had no lesions or ulcerations.  The tonsils were 3+, cryptic. The uvula was midline and the palate raised symmetrically.   Neck -  Palpation of the occipital, submental, submandibular, internal jugular chain, and supraclavicular nodes did not demonstrate any abnormal lymph nodes or masses. The parotid glands were without masses. Palpation of the thyroid was soft and smooth, with no nodules or goiter appreciated.  The trachea was midline.  Cardiovascular - carotid pulses are 2+ bilaterally, regular rhythm    A/P - Ximena Diaz is a 18 year old female with recurrent tonsillitis and chronic tonsillitis. I recommend tonsillectomy, possible adenoidectomy. The remainder of the visit was spent discussing the procedure.    I explained the risks, benefits, and alternatives of tonsillectomy including, but not, limited to: bleeding, possible need to go back to the OR to control bleeding, blood transfusion, pain, and that tonsillectomy will not cure sore throats secondary to other causes such as viral upper respiratory infection. I also discussed the risks of general anesthesia. I also explained the likely need for narcotic (opioid) pain medication that increases the risk of dependency. The patient will need to wean off the medication as soon as possible, and Tylenol and ibuprofen medication are preferred. They agree and wish to proceed. The surgical schedulers will call the patient.     Giovanni Travis MD  Otolaryngology  [facility]            Again, thank you for allowing me to participate in the care of your patient.        Sincerely,        Giovanni Travis MD

## 2019-03-13 NOTE — PROGRESS NOTES
Chief Complaint - tonsillitis    History of Present Illness - Ximena Diaz is a 18 year old female with recurrent acute tonsillitis. The patient describes bouts of significant sore throat with swelling of the tonsils. This happens many times per year, and has been going on for many  years. The patient has had positive strep tests in the past few years.  In between acute flairs the patient notes chronic sore throat.  Also noted is snoring, no apnea. No personal or family history of bleeding disorders.    Past Medical History -   Patient Active Problem List   Diagnosis     Esophageal reflux     Nut allergy     Abdominal pain, left upper quadrant     Non-intractable vomiting with nausea, vomiting of unspecified type     Nexplanon in place       Current Medications -   Current Outpatient Medications:      etonogestrel (IMPLANON/NEXPLANON) 68 MG IMPL, 1 each (68 mg) by Subdermal route continuous, Disp: , Rfl: 0     fexofenadine (ALLEGRA) 180 MG tablet, Take 1 tablet (180 mg) by mouth daily, Disp: 30 tablet, Rfl: 1    Allergies -   Allergies   Allergen Reactions     Nuts Anaphylaxis       Social History -   Social History     Socioeconomic History     Marital status: Single     Spouse name: Not on file     Number of children: Not on file     Years of education: Not on file     Highest education level: Not on file   Occupational History     Not on file   Social Needs     Financial resource strain: Not on file     Food insecurity:     Worry: Not on file     Inability: Not on file     Transportation needs:     Medical: Not on file     Non-medical: Not on file   Tobacco Use     Smoking status: Never Smoker     Smokeless tobacco: Never Used   Substance and Sexual Activity     Alcohol use: No     Drug use: No     Sexual activity: Yes     Partners: Male     Birth control/protection: Condom   Lifestyle     Physical activity:     Days per week: Not on file     Minutes per session: Not on file     Stress: Not on file  "  Relationships     Social connections:     Talks on phone: Not on file     Gets together: Not on file     Attends Islam service: Not on file     Active member of club or organization: Not on file     Attends meetings of clubs or organizations: Not on file     Relationship status: Not on file     Intimate partner violence:     Fear of current or ex partner: Not on file     Emotionally abused: Not on file     Physically abused: Not on file     Forced sexual activity: Not on file   Other Topics Concern     Parent/sibling w/ CABG, MI or angioplasty before 65F 55M? Not Asked   Social History Narrative     Not on file       Family History - see Hpi    Review of Systems - As per HPI and PMHx, no ear infections, otherwise 10+ comprehensive system review is negative.    Physical Exam  /74   Pulse 90   Resp 16   Ht 1.6 m (5' 3\")   Wt 93 kg (205 lb)   SpO2 100%   BMI 36.31 kg/m    General - The patient is in no distress.  Alert and oriented x3, answers questions and cooperates with examination appropriately.   Voice and Breathing - The patient was breathing comfortably without the use of accessory muscles. There was no wheezing, stridor, or stertor.  The patients voice was clear and strong.  Eyes - Extraocular movements intact. Sclera were not icteric or injected, conjunctiva were pink and moist.  Neurologic - Cranial nerves II-XII are grossly intact. Specifically, the facial nerve is intact, House-Brackmann grade 1 of 6.   Nose - No significant external deformity.  Nasal mucosa is pink and moist with no abnormal mucus.  The septum was midline, turbinates are of normal size and position.  No polyps, masses, or purulence.  Mouth - Examination of the oral cavity showed pink, healthy oral mucosa. No lesions or ulcerations noted.  The tongue was mobile and protrudes midline.  Oropharynx - The walls of the oropharynx were smooth, pink, moist, symmetric, and had no lesions or ulcerations.  The tonsils were 3+, " cryptic. The uvula was midline and the palate raised symmetrically.   Neck -  Palpation of the occipital, submental, submandibular, internal jugular chain, and supraclavicular nodes did not demonstrate any abnormal lymph nodes or masses. The parotid glands were without masses. Palpation of the thyroid was soft and smooth, with no nodules or goiter appreciated.  The trachea was midline.  Cardiovascular - carotid pulses are 2+ bilaterally, regular rhythm    A/P - Ximena Diaz is a 18 year old female with recurrent tonsillitis and chronic tonsillitis. I recommend tonsillectomy, possible adenoidectomy. The remainder of the visit was spent discussing the procedure.    I explained the risks, benefits, and alternatives of tonsillectomy including, but not, limited to: bleeding, possible need to go back to the OR to control bleeding, blood transfusion, pain, and that tonsillectomy will not cure sore throats secondary to other causes such as viral upper respiratory infection. I also discussed the risks of general anesthesia. I also explained the likely need for narcotic (opioid) pain medication that increases the risk of dependency. The patient will need to wean off the medication as soon as possible, and Tylenol and ibuprofen medication are preferred. They agree and wish to proceed. The surgical schedulers will call the patient.     Giovanni Travis MD  Otolaryngology  [facility]

## 2019-03-14 ENCOUNTER — TELEPHONE (OUTPATIENT)
Dept: OTOLARYNGOLOGY | Facility: CLINIC | Age: 19
End: 2019-03-14

## 2019-03-14 NOTE — TELEPHONE ENCOUNTER
Type of surgery: tonsillectomy and possible adenoidectomy  CPT 36860  Acute recurrent streptococcal tonsillitis [J03.01]  - Primary   Location of surgery: [facility]  Date and time of surgery: 3-25-19   TBD  Surgeon: Dr Travis  Pre-Op Appt Date: 3-22-19  Post-Op Appt Date: 4-19-19   Packet sent out: Yes  Pre-cert/Authorization completed:  No pre cert needed  Date: 03/14/2019

## 2019-03-15 ENCOUNTER — ANESTHESIA EVENT (OUTPATIENT)
Dept: SURGERY | Facility: AMBULATORY SURGERY CENTER | Age: 19
End: 2019-03-15

## 2019-03-18 NOTE — PROGRESS NOTES
[facility]  [address] [facility] [address] [phone]  Dept: [phone]    PRE-OP EVALUATION:  Today's date: 3/19/2019    Ximena Diaz (: 2000) presents for pre-operative evaluation assessment as requested by Dr. Travis.  She requires evaluation and anesthesia risk assessment prior to undergoing surgery/procedure for treatment of tonsillitis  .    Fax number for surgical facility: [facility]  Primary Physician: Curtis Holley  Type of Anesthesia Anticipated: to be determined    Patient has a Health Care Directive or Living Will:  NO    Preop Questions 3/19/2019   Who is doing your surgery?    What are you having done? tonsils   Date of Surgery/Procedure: 2019   Facility or Hospital where procedure/surgery will be performed: [facility]   1.  Do you have a history of Heart attack, stroke, stent, coronary bypass surgery, or other heart surgery? No   2.  Do you ever have any pain or discomfort in your chest? No   3.  Do you have a history of  Heart Failure? No   4.   Are you troubled by shortness of breath when:  walking on a level surface, or up a slight hill, or at night? No   5.  Do you currently have a cold, bronchitis or other respiratory infection? YES - rhinitis for 3 days   6.  Do you have a cough, shortness of breath, or wheezing? YES - cough x 3 days   7.  Do you sometimes get pains in the calves of your legs when you walk? No   8. Do you or anyone in your family have previous history of blood clots? No   9.  Do you or does anyone in your family have a serious bleeding problem such as prolonged bleeding following surgeries or cuts? No   10. Have you ever had problems with anemia or been told to take iron pills? No   11. Have you had any abnormal blood loss such as black, tarry or bloody stools, or abnormal vaginal bleeding? No   12. Have you ever had a blood transfusion? No   13. Have you or any of your relatives ever had problems with  anesthesia? No   14. Do you have sleep apnea, excessive snoring or daytime drowsiness? YES - snores   15. Do you have any prosthetic heart valves? No   16. Do you have prosthetic joints? No   17. Is there any chance that you may be pregnant? No         HPI:     HPI related to upcoming procedure: history of recurrent tonsillitis and snoring.       See problem list for active medical problems.  Problems all longstanding and stable, except as noted/documented.  See ROS for pertinent symptoms related to these conditions.                                                                                                                                                          .    MEDICAL HISTORY:     Patient Active Problem List    Diagnosis Date Noted     Acute recurrent streptococcal tonsillitis 03/19/2019     Priority: Medium     Nexplanon in place 09/26/2018     Priority: Medium     Placed 9/26/2018         Abdominal pain, left upper quadrant 02/15/2016     Priority: Medium     Non-intractable vomiting with nausea, vomiting of unspecified type 02/15/2016     Priority: Medium     Esophageal reflux 09/03/2015     Priority: Medium     Nut allergy 09/03/2015     Priority: Medium      History reviewed. No pertinent past medical history.  Past Surgical History:   Procedure Laterality Date     ESOPHAGOSCOPY, GASTROSCOPY, DUODENOSCOPY (EGD), COMBINED N/A 2/17/2016    Procedure: COMBINED ESOPHAGOSCOPY, GASTROSCOPY, DUODENOSCOPY (EGD);  Surgeon: Arnav Schmid MD;  Location:  OR     ESOPHAGOSCOPY, GASTROSCOPY, DUODENOSCOPY (EGD), COMBINED N/A 2/17/2016    Procedure: COMBINED ESOPHAGOSCOPY, GASTROSCOPY, DUODENOSCOPY (EGD), BIOPSY SINGLE OR MULTIPLE;  Surgeon: Arnav Schmid MD;  Location:  OR     NO HISTORY OF SURGERY       Current Outpatient Medications   Medication Sig Dispense Refill     etonogestrel (IMPLANON/NEXPLANON) 68 MG IMPL 1 each (68 mg) by Subdermal route continuous  0     fexofenadine (ALLEGRA) 180 MG tablet Take 1  "tablet (180 mg) by mouth daily 30 tablet 1     OTC products: None, except as noted above    Allergies   Allergen Reactions     Nuts Anaphylaxis      Latex Allergy: NO    Social History     Tobacco Use     Smoking status: Never Smoker     Smokeless tobacco: Never Used   Substance Use Topics     Alcohol use: No     History   Drug Use No       REVIEW OF SYSTEMS:   CONSTITUTIONAL: NEGATIVE for fever, chills, change in weight  INTEGUMENTARY/SKIN: NEGATIVE for worrisome rashes, moles or lesions  EYES: NEGATIVE for vision changes or irritation  ENT/MOUTH: NEGATIVE for ear, mouth and throat problems  RESP: NEGATIVE for significant cough or SOB  CV: NEGATIVE for chest pain, palpitations or peripheral edema  GI: NEGATIVE for nausea, abdominal pain, heartburn, or change in bowel habits  : NEGATIVE for frequency, dysuria, or hematuria  MUSCULOSKELETAL: NEGATIVE for significant arthralgias or myalgia  NEURO: NEGATIVE for weakness, dizziness or paresthesias  ENDOCRINE: NEGATIVE for temperature intolerance, skin/hair changes  HEME: NEGATIVE for bleeding problems  PSYCHIATRIC: NEGATIVE for changes in mood or affect    EXAM:   /75   Pulse 100   Temp 98.4  F (36.9  C) (Oral)   Resp 16   Ht 1.625 m (5' 3.98\")   Wt 92.5 kg (204 lb)   LMP 02/19/2019   SpO2 98%   Breastfeeding? No   BMI 35.04 kg/m      GENERAL APPEARANCE: healthy, alert and no distress     EYES: EOMI, PERRL     HENT: ear canals and TM's normal and nose and mouth without ulcers or lesions     NECK: no adenopathy, no asymmetry, masses, or scars and thyroid normal to palpation     RESP: lungs clear to auscultation - no rales, rhonchi or wheezes     CV: regular rates and rhythm, normal S1 S2, no S3 or S4 and no murmur, click or rub     ABDOMEN:  soft, nontender, no HSM or masses and bowel sounds normal     NEURO: Normal strength and tone, sensory exam grossly normal, mentation intact and speech normal     PSYCH: mentation appears normal. and affect " normal/bright     LYMPHATICS: No cervical adenopathy    DIAGNOSTICS:     Labs Drawn and in Process:   Unresulted Labs Ordered in the Past 30 Days of this Admission     No orders found from 1/18/2019 to 3/20/2019.          Recent Labs   Lab Test 02/15/16  1436 12/29/15  1554   HGB 12.4 12.4    316   NA  --  143   POTASSIUM  --  4.4   CR  --  0.78        IMPRESSION:   Reason for surgery/procedure:  treatment of tonsillitis     The proposed surgical procedure is considered LOW risk.    REVISED CARDIAC RISK INDEX  The patient has the following serious cardiovascular risks for perioperative complications such as (MI, PE, VFib and 3  AV Block):  No serious cardiac risks  INTERPRETATION: 0 risks: Class I (very low risk - 0.4% complication rate)    The patient has the following additional risks for perioperative complications:  No identified additional risks      ICD-10-CM    1. Preop general physical exam Z01.818 Hemoglobin     HCG Qual, Urine ([id number])   2. Acute recurrent streptococcal tonsillitis J03.01        RECOMMENDATIONS:         APPROVAL GIVEN to proceed with proposed procedure, without further diagnostic evaluation       Signed Electronically by: Curtis Holley PA-C  Chart reviewed, agree with evaluation and recommendations above.  Loren Bills M.D.       Copy of this evaluation report is provided to requesting physician.    [facility] Preop Guidelines    Revised Cardiac Risk Index

## 2019-03-19 ENCOUNTER — OFFICE VISIT (OUTPATIENT)
Dept: FAMILY MEDICINE | Facility: CLINIC | Age: 19
End: 2019-03-19
Payer: COMMERCIAL

## 2019-03-19 VITALS
BODY MASS INDEX: 34.83 KG/M2 | SYSTOLIC BLOOD PRESSURE: 107 MMHG | WEIGHT: 204 LBS | HEART RATE: 100 BPM | TEMPERATURE: 98.4 F | RESPIRATION RATE: 16 BRPM | HEIGHT: 64 IN | DIASTOLIC BLOOD PRESSURE: 75 MMHG | OXYGEN SATURATION: 98 %

## 2019-03-19 DIAGNOSIS — J03.01 ACUTE RECURRENT STREPTOCOCCAL TONSILLITIS: ICD-10-CM

## 2019-03-19 DIAGNOSIS — Z01.818 PREOP GENERAL PHYSICAL EXAM: Primary | ICD-10-CM

## 2019-03-19 LAB
HCG UR QL: NEGATIVE
HGB BLD-MCNC: 14.9 G/DL (ref 11.7–15.7)

## 2019-03-19 PROCEDURE — 85018 HEMOGLOBIN: CPT | Performed by: PHYSICIAN ASSISTANT

## 2019-03-19 PROCEDURE — 36415 COLL VENOUS BLD VENIPUNCTURE: CPT | Performed by: PHYSICIAN ASSISTANT

## 2019-03-19 PROCEDURE — 99214 OFFICE O/P EST MOD 30 MIN: CPT | Performed by: PHYSICIAN ASSISTANT

## 2019-03-19 PROCEDURE — 81025 URINE PREGNANCY TEST: CPT | Performed by: PHYSICIAN ASSISTANT

## 2019-03-19 ASSESSMENT — PAIN SCALES - GENERAL: PAINLEVEL: NO PAIN (0)

## 2019-03-19 ASSESSMENT — MIFFLIN-ST. JEOR: SCORE: 1679.96

## 2019-03-23 NOTE — ANESTHESIA PREPROCEDURE EVALUATION
Anesthesia Pre-Procedure Evaluation    Patient: Ximena Diaz   MRN:     [id number] Gender:   female   Age:    18 year old :      2000        Preoperative Diagnosis: RECURRENT TONSILLITIS, TONSIL AND ADENOID HYPERTROPHY   Procedure(s):  COMBINED TONSILLECTOMY, ADENOIDECTOMY     No past medical history on file.   Past Surgical History:   Procedure Laterality Date     ESOPHAGOSCOPY, GASTROSCOPY, DUODENOSCOPY (EGD), COMBINED N/A 2016    Procedure: COMBINED ESOPHAGOSCOPY, GASTROSCOPY, DUODENOSCOPY (EGD);  Surgeon: Arnav Schmid MD;  Location: [facility] OR     ESOPHAGOSCOPY, GASTROSCOPY, DUODENOSCOPY (EGD), COMBINED N/A 2016    Procedure: COMBINED ESOPHAGOSCOPY, GASTROSCOPY, DUODENOSCOPY (EGD), BIOPSY SINGLE OR MULTIPLE;  Surgeon: Arnav Schmid MD;  Location: [facility] OR     NO HISTORY OF SURGERY            Anesthesia Evaluation     .             ROS/MED HX    ENT/Pulmonary: Comment: Recurrent strep tonsillitis presenting for T and A    (+)MORRIS risk factors snores loudly, obese, , . .    Neurologic:  - neg neurologic ROS     Cardiovascular:  - neg cardiovascular ROS       METS/Exercise Tolerance:     Hematologic:  - neg hematologic  ROS       Musculoskeletal:  - neg musculoskeletal ROS       GI/Hepatic: Comment: Previous history of intractable vomiting status post EGD    (+) GERD       Renal/Genitourinary:  - ROS Renal section negative       Endo:  - neg endo ROS       Psychiatric:  - neg psychiatric ROS       Infectious Disease:  - neg infectious disease ROS       Malignancy:      - no malignancy   Other:    - neg other ROS                     PHYSICAL EXAM:   Mental Status/Neuro: A/A/O   Airway: Facies: Feasible  Mallampati: II  Mouth/Opening: Full  TM distance: > 6 cm  Neck ROM: Full   Respiratory: Auscultation: CTAB     Resp. Rate: Normal     Resp. Effort: Normal      CV: Rhythm: Regular  Rate: Age appropriate  Heart: Normal Sounds   Comments:      Dental: Normal                  Lab Results   Component Value  "Date    WBC 11.0 02/15/2016    HGB 14.9 03/19/2019    HCT 37.7 02/15/2016     02/15/2016    SED 9 02/15/2016     12/29/2015    POTASSIUM 4.4 12/29/2015    CHLORIDE 109 12/29/2015    CO2 28 12/29/2015    BUN 10 12/29/2015    CR 0.78 12/29/2015    GLC 98 12/29/2015    NIKI 9.4 12/29/2015    ALBUMIN 4.1 12/29/2015    PROTTOTAL 7.7 12/29/2015    ALT 24 12/29/2015    AST 17 12/29/2015    ALKPHOS 97 12/29/2015    BILITOTAL 0.3 12/29/2015    LIPASE 86 02/15/2016    TSH 1.79 02/15/2016    HCG Negative 03/19/2019       Preop Vitals  BP Readings from Last 3 Encounters:   03/19/19 107/75   03/13/19 123/74   09/26/18 117/76    Pulse Readings from Last 3 Encounters:   03/19/19 100   03/13/19 90   09/26/18 76      Resp Readings from Last 3 Encounters:   03/19/19 16   03/13/19 16   09/24/18 16    SpO2 Readings from Last 3 Encounters:   03/19/19 98%   03/13/19 100%   09/24/18 98%      Temp Readings from Last 1 Encounters:   03/19/19 36.9  C (98.4  F) (Oral)    Ht Readings from Last 1 Encounters:   03/19/19 1.625 m (5' 3.98\")      Wt Readings from Last 1 Encounters:   03/19/19 92.5 kg (204 lb)    Estimated body mass index is 35.04 kg/m  as calculated from the following:    Height as of 3/19/19: 1.625 m (5' 3.98\").    Weight as of 3/19/19: 92.5 kg (204 lb).     LDA:            Assessment:   ASA SCORE: 2    NPO Status: > 2 hours since completed Clear Liquids; > 6 hours since completed Solid Foods   Documentation: H&P complete; Preop Testing complete; Consents complete   Proceeding: Proceed without further delay  Tobacco Use:  NO Active use of Tobacco/UNKNOWN Tobacco use status     Plan:   Anes. Type:  General   Pre-Induction: Midazolam IV; Acetaminophen PO; Gabapentin PO   Induction:  IV (Standard)   Airway: Oral ETT; FRANCOIS   Access/Monitoring: PIV   Maintenance: Balanced   Emergence: Procedure Site   Logistics: Same Day Surgery     Postop Pain/Sedation Strategy:  Standard-Options: Opioids PRN     PONV Management:  Adult " Risk Factors: Female, Non-Smoker, Postop Opioids  Prevention: Ondansetron; Dexamethasone     CONSENT: Direct conversation   Plan and risks discussed with: Patient   Blood Products: Consent Deferred (Minimal Blood Loss)                         Aguilar Guo MD

## 2019-03-25 ENCOUNTER — HOSPITAL ENCOUNTER (OUTPATIENT)
Facility: AMBULATORY SURGERY CENTER | Age: 19
Discharge: HOME | End: 2019-03-25
Attending: OTOLARYNGOLOGY | Admitting: OTOLARYNGOLOGY
Payer: COMMERCIAL

## 2019-03-25 ENCOUNTER — ANESTHESIA (OUTPATIENT)
Dept: SURGERY | Facility: AMBULATORY SURGERY CENTER | Age: 19
End: 2019-03-25
Payer: COMMERCIAL

## 2019-03-25 VITALS
HEART RATE: 80 BPM | RESPIRATION RATE: 15 BRPM | TEMPERATURE: 97.9 F | OXYGEN SATURATION: 97 % | SYSTOLIC BLOOD PRESSURE: 98 MMHG | DIASTOLIC BLOOD PRESSURE: 83 MMHG

## 2019-03-25 DIAGNOSIS — J35.01 CHRONIC TONSILLITIS: Primary | ICD-10-CM

## 2019-03-25 DIAGNOSIS — J35.1 TONSILLAR HYPERTROPHY: ICD-10-CM

## 2019-03-25 LAB — HCG SERPL QL: NEGATIVE

## 2019-03-25 PROCEDURE — 84703 CHORIONIC GONADOTROPIN ASSAY: CPT | Performed by: ANESTHESIOLOGY

## 2019-03-25 PROCEDURE — 42826 REMOVAL OF TONSILS: CPT

## 2019-03-25 PROCEDURE — 42826 REMOVAL OF TONSILS: CPT | Performed by: OTOLARYNGOLOGY

## 2019-03-25 PROCEDURE — 88304 TISSUE EXAM BY PATHOLOGIST: CPT | Performed by: FAMILY MEDICINE

## 2019-03-25 PROCEDURE — G8918 PT W/O PREOP ORDER IV AB PRO: HCPCS

## 2019-03-25 PROCEDURE — G8907 PT DOC NO EVENTS ON DISCHARG: HCPCS

## 2019-03-25 RX ORDER — HYDROMORPHONE HYDROCHLORIDE 1 MG/ML
.3-.5 INJECTION, SOLUTION INTRAMUSCULAR; INTRAVENOUS; SUBCUTANEOUS EVERY 10 MIN PRN
Status: DISCONTINUED | OUTPATIENT
Start: 2019-03-25 | End: 2019-03-26 | Stop reason: HOSPADM

## 2019-03-25 RX ORDER — LIDOCAINE HYDROCHLORIDE 20 MG/ML
INJECTION, SOLUTION INFILTRATION; PERINEURAL PRN
Status: DISCONTINUED | OUTPATIENT
Start: 2019-03-25 | End: 2019-03-25

## 2019-03-25 RX ORDER — ACETAMINOPHEN 325 MG/1
975 TABLET ORAL ONCE
Status: DISCONTINUED | OUTPATIENT
Start: 2019-03-25 | End: 2019-03-26 | Stop reason: HOSPADM

## 2019-03-25 RX ORDER — ONDANSETRON 2 MG/ML
4 INJECTION INTRAMUSCULAR; INTRAVENOUS EVERY 30 MIN PRN
Status: DISCONTINUED | OUTPATIENT
Start: 2019-03-25 | End: 2019-03-26 | Stop reason: HOSPADM

## 2019-03-25 RX ORDER — FENTANYL CITRATE 50 UG/ML
25-50 INJECTION, SOLUTION INTRAMUSCULAR; INTRAVENOUS
Status: DISCONTINUED | OUTPATIENT
Start: 2019-03-25 | End: 2019-03-26 | Stop reason: HOSPADM

## 2019-03-25 RX ORDER — OXYCODONE HYDROCHLORIDE 5 MG/1
5-10 TABLET ORAL EVERY 4 HOURS PRN
Status: DISCONTINUED | OUTPATIENT
Start: 2019-03-25 | End: 2019-03-26 | Stop reason: HOSPADM

## 2019-03-25 RX ORDER — DEXAMETHASONE SODIUM PHOSPHATE 10 MG/ML
INJECTION, SOLUTION INTRAMUSCULAR; INTRAVENOUS PRN
Status: DISCONTINUED | OUTPATIENT
Start: 2019-03-25 | End: 2019-03-25

## 2019-03-25 RX ORDER — DEXAMETHASONE SODIUM PHOSPHATE 4 MG/ML
4 INJECTION, SOLUTION INTRA-ARTICULAR; INTRALESIONAL; INTRAMUSCULAR; INTRAVENOUS; SOFT TISSUE EVERY 10 MIN PRN
Status: DISCONTINUED | OUTPATIENT
Start: 2019-03-25 | End: 2019-03-26 | Stop reason: HOSPADM

## 2019-03-25 RX ORDER — GABAPENTIN 300 MG/1
300 CAPSULE ORAL ONCE
Status: DISCONTINUED | OUTPATIENT
Start: 2019-03-25 | End: 2019-03-26 | Stop reason: HOSPADM

## 2019-03-25 RX ORDER — OXYCODONE HCL 5 MG/5 ML
5 SOLUTION, ORAL ORAL EVERY 4 HOURS PRN
Qty: 360 ML | Refills: 0 | Status: ON HOLD | OUTPATIENT
Start: 2019-03-25 | End: 2020-07-17

## 2019-03-25 RX ORDER — MEPERIDINE HYDROCHLORIDE 25 MG/ML
12.5 INJECTION INTRAMUSCULAR; INTRAVENOUS; SUBCUTANEOUS
Status: DISCONTINUED | OUTPATIENT
Start: 2019-03-25 | End: 2019-03-26 | Stop reason: HOSPADM

## 2019-03-25 RX ORDER — SODIUM CHLORIDE, SODIUM LACTATE, POTASSIUM CHLORIDE, CALCIUM CHLORIDE 600; 310; 30; 20 MG/100ML; MG/100ML; MG/100ML; MG/100ML
INJECTION, SOLUTION INTRAVENOUS CONTINUOUS
Status: DISCONTINUED | OUTPATIENT
Start: 2019-03-25 | End: 2019-03-26 | Stop reason: HOSPADM

## 2019-03-25 RX ORDER — GABAPENTIN 300 MG/1
300 CAPSULE ORAL ONCE
Status: COMPLETED | OUTPATIENT
Start: 2019-03-25 | End: 2019-03-25

## 2019-03-25 RX ORDER — BUPIVACAINE HYDROCHLORIDE 2.5 MG/ML
INJECTION, SOLUTION INFILTRATION; PERINEURAL PRN
Status: DISCONTINUED | OUTPATIENT
Start: 2019-03-25 | End: 2019-03-25 | Stop reason: HOSPADM

## 2019-03-25 RX ORDER — NALOXONE HYDROCHLORIDE 0.4 MG/ML
.1-.4 INJECTION, SOLUTION INTRAMUSCULAR; INTRAVENOUS; SUBCUTANEOUS
Status: DISCONTINUED | OUTPATIENT
Start: 2019-03-25 | End: 2019-03-26 | Stop reason: HOSPADM

## 2019-03-25 RX ORDER — PROPOFOL 10 MG/ML
INJECTION, EMULSION INTRAVENOUS CONTINUOUS PRN
Status: DISCONTINUED | OUTPATIENT
Start: 2019-03-25 | End: 2019-03-25

## 2019-03-25 RX ORDER — KETOROLAC TROMETHAMINE 30 MG/ML
30 INJECTION, SOLUTION INTRAMUSCULAR; INTRAVENOUS EVERY 6 HOURS PRN
Status: DISCONTINUED | OUTPATIENT
Start: 2019-03-25 | End: 2019-03-26 | Stop reason: HOSPADM

## 2019-03-25 RX ORDER — PROPOFOL 10 MG/ML
INJECTION, EMULSION INTRAVENOUS PRN
Status: DISCONTINUED | OUTPATIENT
Start: 2019-03-25 | End: 2019-03-25

## 2019-03-25 RX ORDER — ONDANSETRON 2 MG/ML
INJECTION INTRAMUSCULAR; INTRAVENOUS PRN
Status: DISCONTINUED | OUTPATIENT
Start: 2019-03-25 | End: 2019-03-25

## 2019-03-25 RX ORDER — ONDANSETRON 4 MG/1
4 TABLET, ORALLY DISINTEGRATING ORAL EVERY 30 MIN PRN
Status: DISCONTINUED | OUTPATIENT
Start: 2019-03-25 | End: 2019-03-26 | Stop reason: HOSPADM

## 2019-03-25 RX ORDER — LIDOCAINE 40 MG/G
CREAM TOPICAL
Status: DISCONTINUED | OUTPATIENT
Start: 2019-03-25 | End: 2019-03-26 | Stop reason: HOSPADM

## 2019-03-25 RX ORDER — ALBUTEROL SULFATE 0.83 MG/ML
2.5 SOLUTION RESPIRATORY (INHALATION) EVERY 4 HOURS PRN
Status: DISCONTINUED | OUTPATIENT
Start: 2019-03-25 | End: 2019-03-26 | Stop reason: HOSPADM

## 2019-03-25 RX ORDER — ONDANSETRON 4 MG/1
4 TABLET, ORALLY DISINTEGRATING ORAL EVERY 8 HOURS PRN
Qty: 20 TABLET | Refills: 1 | Status: ON HOLD | OUTPATIENT
Start: 2019-03-25 | End: 2020-07-17

## 2019-03-25 RX ORDER — FENTANYL CITRATE 50 UG/ML
INJECTION, SOLUTION INTRAMUSCULAR; INTRAVENOUS PRN
Status: DISCONTINUED | OUTPATIENT
Start: 2019-03-25 | End: 2019-03-25

## 2019-03-25 RX ORDER — ACETAMINOPHEN 325 MG/1
975 TABLET ORAL ONCE
Status: COMPLETED | OUTPATIENT
Start: 2019-03-25 | End: 2019-03-25

## 2019-03-25 RX ADMIN — GABAPENTIN 300 MG: 300 CAPSULE ORAL at 08:17

## 2019-03-25 RX ADMIN — ONDANSETRON 4 MG: 2 INJECTION INTRAMUSCULAR; INTRAVENOUS at 08:38

## 2019-03-25 RX ADMIN — FENTANYL CITRATE 100 MCG: 50 INJECTION, SOLUTION INTRAMUSCULAR; INTRAVENOUS at 08:30

## 2019-03-25 RX ADMIN — ACETAMINOPHEN 975 MG: 325 TABLET ORAL at 08:17

## 2019-03-25 RX ADMIN — OXYCODONE HYDROCHLORIDE 5 MG: 5 TABLET ORAL at 09:23

## 2019-03-25 RX ADMIN — SODIUM CHLORIDE, SODIUM LACTATE, POTASSIUM CHLORIDE, CALCIUM CHLORIDE: 600; 310; 30; 20 INJECTION, SOLUTION INTRAVENOUS at 08:25

## 2019-03-25 RX ADMIN — PROPOFOL 200 MCG/KG/MIN: 10 INJECTION, EMULSION INTRAVENOUS at 08:30

## 2019-03-25 RX ADMIN — LIDOCAINE HYDROCHLORIDE 60 MG: 20 INJECTION, SOLUTION INFILTRATION; PERINEURAL at 08:30

## 2019-03-25 RX ADMIN — DEXAMETHASONE SODIUM PHOSPHATE 10 MG: 10 INJECTION, SOLUTION INTRAMUSCULAR; INTRAVENOUS at 08:35

## 2019-03-25 RX ADMIN — FENTANYL CITRATE 50 MCG: 50 INJECTION, SOLUTION INTRAMUSCULAR; INTRAVENOUS at 09:04

## 2019-03-25 RX ADMIN — PROPOFOL 180 MG: 10 INJECTION, EMULSION INTRAVENOUS at 08:30

## 2019-03-25 NOTE — DISCHARGE INSTRUCTIONS
Postoperative Care for Tonsillectomy (with or without adenoidectomy)    Recovery:  1. The pain and swelling almost always gets worse before it gets better, this is normal.  Usually it peaks 3 to 5 days after the surgery, and then begins improving at 7 to 8 days after surgery.  Of course, this is variable from person to person.  2. Maintain a strict soft diet for the first two weeks. Avoid hard or crunchy things.  If it makes a noise when you bite it, it is too hard; or if it requires much chewing, it is too hard.  Although it is good to begin eating again from day one, it is not unusual to not eat for several days after the procedure.  The most important thing is staying hydrated.  Drink plenty of fluids, with electrolytes if possible, such as dilute sports drinks.  3. The liquid pain medication you were sent home with can make some people very nauseated.  To minimize this, avoid taking it on an empty stomach, or take smaller does.  4. Try to stay ahead of the pain.  In other words, do not wait for pain medication to completely wear off before taking more pain medicine.  Instead, take the medication every 4 hours, even if it requires setting an alarm clock at night.  This is especially helpful during the first 5-7 days. You should also add tylenol (and possibly ibuprofen if needed) to help with pain.  5. The uvula (the small hanging object in the back of your mouth) frequently swells up after tonsillectomy, but will go back to normal.  This swelling can temporarily cause the sensation of something being stuck in your throat, it will go away with recovery.  Also, because of the arrangement of nerves under where the tonsils were, sharp ear pain is very common during recovery, and will also go away with recovery. Temporary tongue pain, numbness, or taste disturbance is common, and will go away with time. Foul breath is common, and is part of the healing process. This too will go away with time.      Activity - Avoid  heavy lifting (greater than 10 pounds) or strenuous exercise until two weeks after surgery.  Also, try to sleep with your head elevated.      Medications - Except blood thinners, almost all medication can be re-started after tonsillectomy.      Complications - Bleeding is the most common serious complication after tonsillectomy.  If there are a few small drops or streaks of blood in the saliva that then goes away, this can be watched.  Gentle gargling with the ice water can also help stop this minor bleeding.  However, if the bleeding is persistent, or heavy bleeding occurs, do not hesitate, go to the emergency room to be evaluated.    Follow up - I like to see my patient approximately 4 weeks after the procedure to make sure that everything has healed appropriately.     If there are any questions or issues with the above, or if there are other issues that concern you, always feel free to call the clinic and I am happy to speak with you as soon as I can.    Giovanni Travis MD   Otolaryngology  [facility]  [phone] or [phone]      After hours/ weekends call #[phone]    Tonsillectomy and Adenoidectomy    What is a tonsillectomy and adenoidectomy?    Tonsillectomy is removal of the tonsils. Adenoidectomy is removal of the adenoids. Tonsils and adenoids are lumps of tissue at the back of the throat. The tonsils and adenoids fight infection. Your child may need the tonsillectomy if he has many bad colds, sore throats, or ear infections. Tonsillectomy and adenoidectomy (T&A) are often done together.    What can I expect after Surgery?    It is common to have an upset stomach and vomiting during the first 24 hours after surgery.    Your child s throat may be sore for two weeks, especially when eating. The soreness may get better after a few days and then get worse again. Your child s voice may change a little after surgery.    Ear pain is common, often when swallowing, because the ear and throat have  a common sensory nerve. Jaw spasms, or uncontrollable movement of the jaw, may also occur and cause pain.    Neck soreness is common after an adenoidectomy and usually last about one week.    Your child will have bad breath for a few weeks because your child s throat is swollen, snoring is common after surgery but should go away after about two weeks.    How should I care for my child?    Encourage your child to drink plenty of liquids (at least 2 -3 ounces per hour)  keeping the throat moist decreases discomfort and prevents dehydration( a  dangerous condition in which the body gets dried out.)    Give pain medication regularly within the limits directed by your doctor. Give  it before bed and first thing after waking in the morning. Try to give the   pain medication 30 minutes before meals to help make swallowing easier.    To prevent bleeding, avoid coughing, nose-blowing, clearing throat, and   spitting. Wipe nose gently if needed. When sneezing, encourage your child to   Open the mouth and make a sound, to prevent pressure buildup.    Avoid coming in contact with people who have colds, flu, or infections.      What can my child eat?    The day of surgery, give only cool, Clear liquids such as:    ? Apple Juice  ? Jell-o*  ? Augusto-aid*  ? Popsicles*  ? Flat pop (remove bubbles)  ? Water    If your child has an upset stomach, give small amounts often. Note: If   Your child vomits after drinking red liquids the vomit will be the same  color.    After the first day, when your child wants them add dairy and soft foods such as:  ? Ice cream  ? Milk shakes(use spoon)  ? Pudding  ? Smooth yogurt  Liquids are more important than food.    Be sure your child is drinking a lot.    When your child wants them, add soft foods (foods without rough  edges). See the chart for ideas. If a food is not on the list ask yourself:    Is it easy to chew? Is it free of coarse, rough, or crispy edges?  If the answer  is yes, your child  can probably eat it.    Be sure to cut foods very small and encourage your child to chew them  well. Continue the soft diet for 2 weeks after surgery Avoid citrus fruits and juices   such as orange juice and lemonade, as they may sting your child s throat. Avoid  foods that are hot in temperature or spicy hot.                               May Eat Should not eat   - Soft bread  - Soggy waffles or   [place] toast (no crusts).  Soaked in syrup  - Pancakes  - Scrambled or   poached egg   - Toast  - Crispy waffles  - Fried foods   - Oatmeal,or   Creamy cereal  - Soggy cold cereal  (soaked in milk   - Crunchy cold   cereal   - Soup  - Hot dogs  - Hamburgers  - Tender, moist  meat  - Pasta, noodles  - Spaghetti-Os  - Macaroni and  Cheese   - Tough, dry meat,  chicken or fish   - Milk  - Custard, pudding  - Ice cream  - Malts, shakes  - Yogurt (smooth)  - Cottage cheese   - Cookies  - Crackers  - Pretzels  - Chips  - Popcorn  - Nuts   - Sandwiches, (no crusts)  - Smooth peanut butter   and jelly  - Processed cheese  - Tuna - Grilled cheese  sandwiches   - Cooked vegetables  - Mashed potatoes - Raw vegetables   - Tomatoes   - Applesauce  - Bananas  - Canned fruits  - Watermelon with out  seeds - Citrus fruits  - Moist fresh fruits   - Juices (not citrus)  - Augusto aid  - Flat pop (no bubbles)  - Jell-O - Citrus juices  - Pop with bubbles           [facility]  Same-Day Surgery   Adult Discharge Orders & Instructions   For 24 hours after surgery  1. Get plenty of rest.  A responsible adult must stay with you for at least 24 hours after you leave the hospital.   2. Do not drive or use heavy equipment.  If you have weakness or tingling, don't drive or use heavy equipment until this feeling goes away.  3. Do not drink alcohol.  4. Avoid strenuous or risky activities.  Ask for help when climbing stairs.   5. You may feel lightheaded.  IF so, sit for a few minutes before standing.  Have someone help you get  up.   6. If you have nausea (feel sick to your stomach): Drink only clear liquids such as apple juice, ginger ale, broth or 7-Up.  Rest may also help.  Be sure to drink enough fluids.  Move to a regular diet as you feel able.  7. You may have a slight fever. Call the doctor if your fever is over 100 F (37.7 C) (taken under the tongue) or lasts longer than 24 hours.  8. You may have a dry mouth, a sore throat, muscle aches or trouble sleeping.  These should go away after 24 hours.  9. Do not make important or legal decisions.   Call your doctor for any of the followin.  Signs of infection (fever, growing tenderness at the surgery site, a large amount of drainage or bleeding, severe pain, foul-smelling drainage, redness, swelling).    2. It has been over 8 to 10 hours since surgery and you are still not able to urinate (pass water).    3.  Headache for over 24 hours.    4.  Numbness, tingling or weakness the day after surgery (if you had spinal anesthesia).  Tylenol 975 mg given at 0815

## 2019-03-25 NOTE — ANESTHESIA CARE TRANSFER NOTE
Patient: Ximena Diaz    Procedure(s):  BILATERAL  TONSILLECTOMY    Diagnosis: RECURRENT TONSILLITIS, TONSIL AND ADENOID HYPERTROPHY  Diagnosis Additional Information: No value filed.    Anesthesia Type:   General     Note:  Airway :Face Mask  Patient transferred to:PACU  Comments: Exchanging well, sats 99%< Report to RN.Handoff Report: Identifed the Patient, Identified the Reponsible Provider, Reviewed the pertinent medical history, Discussed the surgical course, Reviewed Intra-OP anesthesia mangement and issues during anesthesia, Set expectations for post-procedure period and Allowed opportunity for questions and acknowledgement of understanding      Vitals: (Last set prior to Anesthesia Care Transfer)    CRNA VITALS  3/25/2019 0834 - 3/25/2019 0910      3/25/2019             Pulse:  105    SpO2:  98 %    Resp Rate (observed):  13                Electronically Signed By: JESICA Byrne CRNA  March 25, 2019  9:10 AM

## 2019-03-25 NOTE — ANESTHESIA POSTPROCEDURE EVALUATION
Anesthesia POST Procedure Evaluation    Patient: Ximena Diaz   MRN:     [id number] Gender:   female   Age:    18 year old :      2000        Preoperative Diagnosis: RECURRENT TONSILLITIS, TONSIL AND ADENOID HYPERTROPHY   Procedure(s):  BILATERAL  TONSILLECTOMY   Postop Comments: No value filed.       Anesthesia Type:  General    Reportable Event: NO     PAIN: Uncomplicated   Sign Out status: Comfortable, Well controlled pain     PONV: No PONV   Sign Out status:  No Nausea or Vomiting     Neuro/Psych: Uneventful perioperative course   Sign Out Status: Preoperative baseline; Age appropriate mentation     Airway/Resp.: Uneventful perioperative course   Sign Out Status: Non labored breathing, age appropriate RR; Resp. Status within EXPECTED Parameters     CV: Uneventful perioperative course   Sign Out status: Appropriate BP and perfusion indices; Appropriate HR/Rhythm     Disposition:   Sign Out in:  PACU  Disposition:  Phase II; Home  Recovery Course: Uneventful  Follow-Up: Not required     Comments/Narrative:  Patient doing well post-operatively.  No significant issues.  Hemodynamically stable, pain well controlled, nausea well controlled.  Stable for discharge from the PACU             Last Anesthesia Record Vitals:  CRNA VITALS  3/25/2019 0834 - 3/25/2019 0934      3/25/2019             Pulse:  105    SpO2:  98 %    Resp Rate (observed):  13          Last PACU/Preop Vitals:  Vitals:    19 0915 19 0930 19 0945   BP: 110/74 116/82 98/83   Pulse: 96 83 80   Resp: (!) 31 12 15   Temp:      SpO2: 96% 96% 97%         Electronically Signed By: Aguilar Guo MD, 2019, 10:53 AM

## 2019-03-25 NOTE — OP NOTE
PREOPERATIVE DIAGNOSES:   1. chronic tonsillitis.   2. Tonsil hypertrophy.   POSTOPERATIVE DIAGNOSES:   1. chronic tonsillitis.   2. Tonsil hypertrophy.   PROCEDURE PERFORMED: Tonsillectomy  SURGEON: Giovanni Travis MD   ASSISTANTS: none  BLOOD LOSS: 5 mL.   COMPLICATIONS: None.   SPECIMENS: None.   ANESTHESIA: GETA.   GRAFTS, IMPLANTS, DEVICES: none  INDICATIONS: Ximena Diaz presented to me with a longstanding history of chronic and recurrent tonsillitis and tonsillar hypertrophy, therefore my recommendation was for surgery. Preoperatively, the risks discussed included the risks of infection, bleeding, the risks of general anesthesia. Also, the possibility of need for emergent return to the operating room was discussed. They understood and wished to proceed.   OPERATIVE PROCEDURE: After being taken to the operating room and induction of general endotracheal tube anesthesia, the bed was rotated 90 degrees and a head turban was placed. A procedural pause was conducted to identify the patient by name, birthday, and procedure. The patient was suspended from the [place] stand with a McGyver mouth gag. I turned my attention to the tonsils and they were severely hypertrophic (3+) and cryptic. I grasped the left tonsil with an Allis forceps and retracted medially and performed dissection of the tonsil from the fossa utilizing monopolar cautery, and the left tonsil came out very smoothly. I then turned my attention to the right side, once again using an Allis forceps to grasp it and retract it medially, and then I performed dissection of the tonsil from the fossa, and the right tonsil also came out very smoothly. I examined the adenoid and it was atrophic so I left this alone.  I reinspected the tonsil beds and there was good hemostasis. I cauterized any potential bleeders, irrigated, and valsalved the patient. Hemostasis was achieved. I injected 5 ml of 0.25% bupivacaine. I suctioned the stomach with a flexible catheter. The  bed was rotated 90 degrees and the patient was awakened, extubated and sent to the recovery room in good condition.

## 2019-03-27 LAB — COPATH REPORT: NORMAL

## 2019-09-15 ENCOUNTER — OFFICE VISIT (OUTPATIENT)
Dept: URGENT CARE | Facility: URGENT CARE | Age: 19
End: 2019-09-15
Payer: COMMERCIAL

## 2019-09-15 VITALS
SYSTOLIC BLOOD PRESSURE: 112 MMHG | TEMPERATURE: 98.4 F | DIASTOLIC BLOOD PRESSURE: 76 MMHG | BODY MASS INDEX: 34.15 KG/M2 | HEART RATE: 78 BPM | HEIGHT: 64 IN | WEIGHT: 200 LBS | OXYGEN SATURATION: 99 %

## 2019-09-15 DIAGNOSIS — J06.9 VIRAL URI: Primary | ICD-10-CM

## 2019-09-15 PROCEDURE — 99213 OFFICE O/P EST LOW 20 MIN: CPT | Performed by: FAMILY MEDICINE

## 2019-09-15 ASSESSMENT — MIFFLIN-ST. JEOR: SCORE: 1661.87

## 2019-09-15 NOTE — PROGRESS NOTES
Subjective     Ximena Diaz is a 18 year old female who presents to clinic today for the following health issues:    HPI   Chief Complaint   Patient presents with     Cough       Duration: 4 days     Description (location/character/radiation): cough, sinus congestion, headache, fever, sore throat,     Intensity:  moderate    Accompanying signs and symptoms: no sob, chest pain    History (similar episodes/previous evaluation): None    Precipitating or alleviating factors: None    Therapies tried and outcome: dayquil, nyquil     No sick contacts        Patient Active Problem List   Diagnosis     Esophageal reflux     Nut allergy     Abdominal pain, left upper quadrant     Non-intractable vomiting with nausea, vomiting of unspecified type     Nexplanon in place     Acute recurrent streptococcal tonsillitis     Past Surgical History:   Procedure Laterality Date     ESOPHAGOSCOPY, GASTROSCOPY, DUODENOSCOPY (EGD), COMBINED N/A 2/17/2016    Procedure: COMBINED ESOPHAGOSCOPY, GASTROSCOPY, DUODENOSCOPY (EGD);  Surgeon: Arnav Schmid MD;  Location: [facility]     ESOPHAGOSCOPY, GASTROSCOPY, DUODENOSCOPY (EGD), COMBINED N/A 2/17/2016    Procedure: COMBINED ESOPHAGOSCOPY, GASTROSCOPY, DUODENOSCOPY (EGD), BIOPSY SINGLE OR MULTIPLE;  Surgeon: Arnav Schmid MD;  Location: [facility] OR     NO HISTORY OF SURGERY       TONSILLECTOMY, ADENOIDECTOMY, COMBINED Bilateral 3/25/2019    Procedure: BILATERAL  TONSILLECTOMY;  Surgeon: Giovanni Travis MD;  Location: [facility] OR       Social History     Tobacco Use     Smoking status: Never Smoker     Smokeless tobacco: Never Used   Substance Use Topics     Alcohol use: No     History reviewed. No pertinent family history.      Current Outpatient Medications   Medication Sig Dispense Refill     etonogestrel (IMPLANON/NEXPLANON) 68 MG IMPL 1 each (68 mg) by Subdermal route continuous (Patient not taking: Reported on 9/15/2019)  0     fexofenadine (ALLEGRA) 180 MG tablet Take 1 tablet (180 mg) by mouth  "daily 30 tablet 1     ondansetron (ZOFRAN-ODT) 4 MG ODT tab Take 1 tablet (4 mg) by mouth every 8 hours as needed for nausea or vomiting (Patient not taking: Reported on 9/15/2019) 20 tablet 1     oxyCODONE (ROXICODONE) 5 MG/5ML solution Take 5 mLs (5 mg) by mouth every 4 hours as needed for moderate to severe pain (Patient not taking: Reported on 9/15/2019) 360 mL 0     Allergies   Allergen Reactions     Nuts Anaphylaxis     Recent Labs   Lab Test 02/15/16  1436 12/29/15  1554   ALT  --  24   CR  --  0.78   GFRESTIMATED  --  >90  Non  GFR Calc     GFRESTBLACK  --  >90   GFR Calc     POTASSIUM  --  4.4   TSH 1.79  --       BP Readings from Last 3 Encounters:   09/15/19 112/76   03/25/19 98/83   03/19/19 107/75    Wt Readings from Last 3 Encounters:   09/15/19 90.7 kg (200 lb)   03/19/19 92.5 kg (204 lb)   03/13/19 93 kg (205 lb)                    Reviewed and updated as needed this visit by Provider         Review of Systems   ROS COMP: Constitutional, HEENT, cardiovascular, pulmonary, gi and gu systems are negative, except as otherwise noted.      Objective    /76   Pulse 78   Temp 98.4  F (36.9  C) (Oral)   Ht 1.625 m (5' 3.98\")   Wt 90.7 kg (200 lb)   SpO2 99%   Breastfeeding? No   BMI 34.35 kg/m    Body mass index is 34.35 kg/m .  Physical Exam   GENERAL: healthy, alert and no distress  EYES: Eyes grossly normal to inspection, PERRL and conjunctivae and sclerae normal  HENT: ear canals and TM's normal, nose and mouth without ulcers or lesions  NECK: no adenopathy, no asymmetry, masses, or scars and thyroid normal to palpation  RESP: lungs clear to auscultation - no rales, rhonchi or wheezes  CV: regular rates and rhythm, normal S1 S2, no S3 or S4 and no murmur, click or rub  ABDOMEN: soft, nontender, no hepatosplenomegaly, no masses and bowel sounds normal  MS: no gross musculoskeletal defects noted, no edema      Assessment & Plan       ICD-10-CM    1. Viral URI " J06.9         Discussed in detail differentials and further management. Symptoms are likely secondary to viral infection. Recommended well hydration, over-the-counter analgesia, warm fluids and saline gargles. Follow up if symptoms persist or worsen. Written instructions/information provided. Patient understood and in agreement with the above plan. All questions are answered.           Patient Instructions       Patient Education     Viral Upper Respiratory Illness (Adult)    You have a viral upper respiratory illness (URI), which is another term for the common cold. This illness is contagious during the first few days. It is spread through the air by coughing and sneezing. It may also be spread by direct contact (touching the sick person and then touching your own eyes, nose, or mouth). Frequent handwashing will decrease risk of spread. Most viral illnesses go away within 7 to 10 days with rest and simple home remedies. Sometimes the illness may last for several weeks. Antibiotics will not kill a virus, and they are generally not prescribed for this condition.  Home care    If symptoms are severe, rest at home for the first 2 to 3 days. When you resume activity, don't let yourself get too tired.    Don't smoke. If you need help stopping, talk with your healthcare provider.    Avoid being exposed to cigarette smoke (yours or others ).    You may use acetaminophen or ibuprofen to control pain and fever, unless another medicine was prescribed. If you have chronic liver or kidney disease, have ever had a stomach ulcer or gastrointestinal bleeding, or are taking blood-thinning medicines, talk with your healthcare provider before using these medicines. Aspirin should never be given to anyone under 18 years of age who is ill with a viral infection or fever. It may cause severe liver or brain damage.    Your appetite may be poor, so a light diet is fine. Stay well hydrated by drinking 6 to 8 glasses of fluids per day  (water, soft drinks, juices, tea, or soup). Extra fluids will help loosen secretions in the nose and lungs.    Over-the-counter cold medicines will not shorten the length of time you re sick, but they may be helpful for the following symptoms: cough, sore throat, and nasal and sinus congestion. If you take prescription medicines, ask your healthcare provider or pharmacist which over-the-counter medicines are safe to use. (Note: Don't use decongestants if you have high blood pressure.)  Follow-up care  Follow up with your healthcare provider, or as advised.  When to seek medical advice  Call your healthcare provider right away if any of these occur:    Cough with lots of colored sputum (mucus)    Severe headache; face, neck, or ear pain    Difficulty swallowing due to throat pain    Fever of 100.4 F (38 C) or higher, or as directed by your healthcare provider  Call 911  Call 911 if any of these occur:    Chest pain, shortness of breath, wheezing, or difficulty breathing    Coughing up blood    Very severe pain with swallowing, especially if it goes along with a muffled voice   Date Last Reviewed: 6/1/2018 2000-2018 [employer] [address] [place] [address], [place] [address]. All rights reserved. This information is not intended as a substitute for professional medical care. Always follow your healthcare professional's instructions.                 Matt Santiago MD  [facility]

## 2019-09-15 NOTE — PATIENT INSTRUCTIONS

## 2019-09-15 NOTE — LETTER
September 15, 2019      Ximena Diaz  [address] [facility] [id number]        To Whom It May Concern:        Ximena Diaz was seen in our clinic. Kindly excuse her work absence for tomorrow.         Sincerely,        Matt Santiago MD

## 2019-11-04 ENCOUNTER — HEALTH MAINTENANCE LETTER (OUTPATIENT)
Age: 19
End: 2019-11-04

## 2020-01-28 ENCOUNTER — OFFICE VISIT (OUTPATIENT)
Dept: FAMILY MEDICINE | Facility: OTHER | Age: 20
End: 2020-01-28
Payer: COMMERCIAL

## 2020-01-28 VITALS
SYSTOLIC BLOOD PRESSURE: 118 MMHG | HEIGHT: 64 IN | RESPIRATION RATE: 16 BRPM | BODY MASS INDEX: 39.27 KG/M2 | OXYGEN SATURATION: 97 % | WEIGHT: 230 LBS | HEART RATE: 113 BPM | TEMPERATURE: 98.3 F | DIASTOLIC BLOOD PRESSURE: 74 MMHG

## 2020-01-28 DIAGNOSIS — R82.90 NONSPECIFIC FINDING ON EXAMINATION OF URINE: ICD-10-CM

## 2020-01-28 DIAGNOSIS — N20.0 NEPHROLITHIASIS: Primary | ICD-10-CM

## 2020-01-28 LAB
ALBUMIN UR-MCNC: 30 MG/DL
APPEARANCE UR: CLEAR
BACTERIA #/AREA URNS HPF: ABNORMAL /HPF
BILIRUB UR QL STRIP: ABNORMAL
COLOR UR AUTO: YELLOW
GLUCOSE UR STRIP-MCNC: NEGATIVE MG/DL
HGB UR QL STRIP: ABNORMAL
KETONES UR STRIP-MCNC: NEGATIVE MG/DL
LEUKOCYTE ESTERASE UR QL STRIP: ABNORMAL
NITRATE UR QL: NEGATIVE
NON-SQ EPI CELLS #/AREA URNS LPF: ABNORMAL /LPF
PH UR STRIP: 5.5 PH (ref 5–7)
RBC #/AREA URNS AUTO: ABNORMAL /HPF
SOURCE: ABNORMAL
SP GR UR STRIP: 1.02 (ref 1–1.03)
UROBILINOGEN UR STRIP-ACNC: 0.2 EU/DL (ref 0.2–1)
WBC #/AREA URNS AUTO: ABNORMAL /HPF

## 2020-01-28 PROCEDURE — 81001 URINALYSIS AUTO W/SCOPE: CPT | Performed by: PHYSICIAN ASSISTANT

## 2020-01-28 PROCEDURE — 87086 URINE CULTURE/COLONY COUNT: CPT | Performed by: PHYSICIAN ASSISTANT

## 2020-01-28 PROCEDURE — 99214 OFFICE O/P EST MOD 30 MIN: CPT | Performed by: PHYSICIAN ASSISTANT

## 2020-01-28 RX ORDER — NITROFURANTOIN 25; 75 MG/1; MG/1
100 CAPSULE ORAL 2 TIMES DAILY
Qty: 10 CAPSULE | Refills: 0 | Status: ON HOLD | OUTPATIENT
Start: 2020-01-28 | End: 2020-07-17

## 2020-01-28 RX ORDER — HYDROCODONE BITARTRATE AND ACETAMINOPHEN 5; 325 MG/1; MG/1
1 TABLET ORAL PRN
Status: ON HOLD | COMMUNITY
Start: 2020-01-24 | End: 2020-07-17

## 2020-01-28 RX ORDER — TAMSULOSIN HYDROCHLORIDE 0.4 MG/1
0.4 CAPSULE ORAL DAILY
Qty: 30 CAPSULE | Refills: 0 | Status: SHIPPED | OUTPATIENT
Start: 2020-01-28 | End: 2020-07-28

## 2020-01-28 ASSESSMENT — MIFFLIN-ST. JEOR: SCORE: 1789.78

## 2020-01-28 NOTE — PATIENT INSTRUCTIONS
Take the Flomax once daily  Take the antibiotic twice per day.   Keep hydrated  Keep straining urine.     Let me know if not improving by Friday, sooner if worse  Monitor for fevers, chills, abdominal pain, nausea, vomiting, lack of urination, pain with urination, noticing blood, etc.

## 2020-01-28 NOTE — PROGRESS NOTES
Subjective     Ximena Diaz is a 19 year old female who presents to clinic today for the following health issues:    HPI   ED/UC Followup:    Facility:  [facility]   Date of visit: 01/24/2020  Reason for visit: kidney stone  Current Status: back pain a little bit, as well as some burning in lower back-its not bad as it was before but is still there.      Friday around 1:30 AM woke up with low left side back pain went to the ED.  They performed work-up and found on CT that she had a 0.2 cm kidney stone and UA was consistent with nephrolithiasis.  She was discharged with pain medications and Zofran.   Has not taken the pain medication recently.  Not nauseated.  Her pain has improved.  Still having some discomfort in the left lower side but not as severe.  No pain with urination but has noticed increased urination which happened when she was in the ER and found out she had the stone.  No fevers or chills.  No abdominal pain, nausea, vomiting, diarrhea, constipation, melena, hematochezia, dysuria, hematuria, vaginal discharge, itching irritation.    No history of kidney stones.   No family history of kidney stones.   No diet changes or supplements recently.     Her pain has improved significantly, still having some discomfort on the left low back and when lying down she'll notice some discomfort in the right groin.   Urinating normally but frequently. No blood or discomfort.   No fevers or chills, nausea, vomiting.   Having regular bowel movements.   Currently not on any medication for pain.         Current Outpatient Medications   Medication Sig Dispense Refill     etonogestrel (IMPLANON/NEXPLANON) 68 MG IMPL 1 each (68 mg) by Subdermal route continuous  0     nitroFURantoin macrocrystal-monohydrate (MACROBID) 100 MG capsule Take 1 capsule (100 mg) by mouth 2 times daily for 5 days 10 capsule 0     tamsulosin (FLOMAX) 0.4 MG capsule Take 1 capsule (0.4 mg) by mouth daily 30 capsule 0     fexofenadine (ALLEGRA)  "180 MG tablet Take 1 tablet (180 mg) by mouth daily 30 tablet 1     HYDROcodone-acetaminophen (NORCO) 5-325 MG tablet Take 1 tablet by mouth as needed       ondansetron (ZOFRAN-ODT) 4 MG ODT tab Take 1 tablet (4 mg) by mouth every 8 hours as needed for nausea or vomiting (Patient not taking: Reported on 9/15/2019) 20 tablet 1     oxyCODONE (ROXICODONE) 5 MG/5ML solution Take 5 mLs (5 mg) by mouth every 4 hours as needed for moderate to severe pain (Patient not taking: Reported on 9/15/2019) 360 mL 0     Allergies   Allergen Reactions     Nuts Anaphylaxis     No Clinical Screening - See Comments Rash     Nickel        Reviewed and updated as needed this visit by Provider  Tobacco  Allergies  Meds  Problems  Med Hx  Surg Hx  Fam Hx         Review of Systems   ROS COMP: Constitutional, GI, , musculoskeletal, neuro, skin systems are negative, except as otherwise noted.      Objective    /74   Pulse 113   Temp 98.3  F (36.8  C) (Temporal)   Resp 16   Ht 1.62 m (5' 3.78\")   Wt 104.3 kg (230 lb)   SpO2 97%   BMI 39.75 kg/m    Body mass index is 39.75 kg/m .  Physical Exam   GENERAL: healthy, alert and no distress  RESP: lungs clear to auscultation - no rales, rhonchi or wheezes  CV: regular rate and rhythm, normal S1 S2, no S3 or S4, no murmur, click or rub, no peripheral edema and peripheral pulses strong  ABDOMEN: soft, mild discomfort suprapubic region to deep palpation, otherwise non-tender to palpation, no hepatosplenomegaly, no masses and bowel sounds normal, no abdominal rigidity.  MS: no gross musculoskeletal defects noted, no edema  BACK: no CVA tenderness, no paralumbar tenderness    Diagnostic Test Results:  Labs reviewed in Epic  Results for orders placed or performed in visit on 01/28/20 (from the past 24 hour(s))   *UA reflex to Microscopic   Result Value Ref Range    Color Urine Yellow     Appearance Urine Clear     Glucose Urine Negative NEG^Negative mg/dL    Bilirubin Urine Small (A) " NEG^Negative    Ketones Urine Negative NEG^Negative mg/dL    Specific Gravity Urine 1.025 1.003 - 1.035    Blood Urine Moderate (A) NEG^Negative    pH Urine 5.5 5.0 - 7.0 pH    Protein Albumin Urine 30 (A) NEG^Negative mg/dL    Urobilinogen Urine 0.2 0.2 - 1.0 EU/dL    Nitrite Urine Negative NEG^Negative    Leukocyte Esterase Urine Moderate (A) NEG^Negative    Source Urine    Urine Microscopic   Result Value Ref Range    WBC Urine 5-10 (A) OTO5^0 - 5 /HPF    RBC Urine 5-10 (A) OTO2^O - 2 /HPF    Squamous Epithelial /LPF Urine Moderate (A) FEW^Few /LPF    Bacteria Urine Moderate (A) NEG^Negative /HPF   Urine Culture Aerobic Bacterial   Result Value Ref Range    Specimen Description Unspecified Urine     Special Requests Specimen received in preservative     Culture Micro PENDING            Assessment & Plan       ICD-10-CM    1. Nephrolithiasis N20.0 *UA reflex to Microscopic     UROLOGY ADULT REFERRAL     tamsulosin (FLOMAX) 0.4 MG capsule     Urine Microscopic     nitroFURantoin macrocrystal-monohydrate (MACROBID) 100 MG capsule   2. Nonspecific finding on examination of urine R82.90 Urine Culture Aerobic Bacterial         Repeated UA which shows improvement in RBC, there is moderate leukocyte esterase present with 5-10 WBC which is comparable to previous UA.  Elected to start her on antibiotic to cover for cystitis but reassured that her pain is improving and no new or worsening symptoms.  Did start her on Flomax as well as she has not noticed a stone that has passed and she is straining her urine constantly.  We discussed risks and side effects of medication.  Encouraged to stay hydrated.  Recommended use of OTC Anti-inflammatories. We will call with  when available.  Did refer to urology if this is not improving or has persistent symptoms.  Consider additional lab work-up based on the stone if she is able to obtain it for analysis.     Return in about 3 days (around 1/31/2020) for If not improving, sooner if  worse or new concerns.     Options for treatment and follow-up care were reviewed with the patient and/or guardian. Patient and/or guardian engaged in the decision making process and verbalized understanding of the options discussed and agreed with the final plan.      Asim Cheek PA-C  [facility]

## 2020-01-29 LAB
BACTERIA SPEC CULT: NORMAL
Lab: NORMAL
SPECIMEN SOURCE: NORMAL

## 2020-01-31 ENCOUNTER — TELEPHONE (OUTPATIENT)
Dept: FAMILY MEDICINE | Facility: OTHER | Age: 20
End: 2020-01-31

## 2020-01-31 NOTE — TELEPHONE ENCOUNTER
Continue with plan to see Urology.     If your symptoms are getting worse or new concerns then need to follow-up in clinic.     Asim Cheek PA-C

## 2020-01-31 NOTE — TELEPHONE ENCOUNTER
----- Message from Asim Cheek PA-C sent at 1/31/2020  7:45 AM CST -----  Please call.  Her UC showed mixed growth which is not typically a true infection.  Has she passed a stone yet? Are her symptoms improving?    Asim Cheek PA-C

## 2020-01-31 NOTE — TELEPHONE ENCOUNTER
Attempted to contact patient - someone answered and then hung up. Will try again later. Otherwise, please give message below if she calls back.

## 2020-02-12 ENCOUNTER — OFFICE VISIT (OUTPATIENT)
Dept: UROLOGY | Facility: OTHER | Age: 20
End: 2020-02-12
Payer: COMMERCIAL

## 2020-02-12 VITALS
DIASTOLIC BLOOD PRESSURE: 77 MMHG | HEART RATE: 94 BPM | OXYGEN SATURATION: 96 % | WEIGHT: 235.1 LBS | SYSTOLIC BLOOD PRESSURE: 109 MMHG | BODY MASS INDEX: 40.63 KG/M2

## 2020-02-12 DIAGNOSIS — N20.1 URETERAL STONE: Primary | ICD-10-CM

## 2020-02-12 DIAGNOSIS — M54.50 BILATERAL LOW BACK PAIN WITHOUT SCIATICA, UNSPECIFIED CHRONICITY: ICD-10-CM

## 2020-02-12 LAB
ALBUMIN UR-MCNC: NEGATIVE MG/DL
APPEARANCE UR: CLEAR
BACTERIA #/AREA URNS HPF: ABNORMAL /HPF
BILIRUB UR QL STRIP: ABNORMAL
COLOR UR AUTO: YELLOW
GLUCOSE UR STRIP-MCNC: NEGATIVE MG/DL
HGB UR QL STRIP: ABNORMAL
KETONES UR STRIP-MCNC: NEGATIVE MG/DL
LEUKOCYTE ESTERASE UR QL STRIP: NEGATIVE
NITRATE UR QL: NEGATIVE
PH UR STRIP: 5.5 PH (ref 5–7)
RBC #/AREA URNS AUTO: ABNORMAL /HPF
SOURCE: ABNORMAL
SP GR UR STRIP: >1.03 (ref 1–1.03)
UROBILINOGEN UR STRIP-ACNC: 0.2 EU/DL (ref 0.2–1)
WBC #/AREA URNS AUTO: ABNORMAL /HPF

## 2020-02-12 PROCEDURE — 99203 OFFICE O/P NEW LOW 30 MIN: CPT | Performed by: UROLOGY

## 2020-02-12 PROCEDURE — 81001 URINALYSIS AUTO W/SCOPE: CPT | Performed by: UROLOGY

## 2020-02-12 NOTE — PROGRESS NOTES
S: Patient is a pleasant 19-year-old female who is request be seen by Asim Cheek for a consultation with regard to patient's ureteral stone.  Patient seen emergency room several weeks ago with left flank pain was found to have a 2 mm stone in the left distal ureter.  She has no fever nausea or vomiting.  Since then she has had intermittent pain in her low back and on both sides.  It is worse at night when she sleeping.  She has no fever nausea vomiting or any irritative voiding symptoms.  Current Outpatient Medications   Medication Sig Dispense Refill     etonogestrel (IMPLANON/NEXPLANON) 68 MG IMPL 1 each (68 mg) by Subdermal route continuous  0     tamsulosin (FLOMAX) 0.4 MG capsule Take 1 capsule (0.4 mg) by mouth daily 30 capsule 0     fexofenadine (ALLEGRA) 180 MG tablet Take 1 tablet (180 mg) by mouth daily 30 tablet 1     HYDROcodone-acetaminophen (NORCO) 5-325 MG tablet Take 1 tablet by mouth as needed       ondansetron (ZOFRAN-ODT) 4 MG ODT tab Take 1 tablet (4 mg) by mouth every 8 hours as needed for nausea or vomiting (Patient not taking: Reported on 2/12/2020) 20 tablet 1     oxyCODONE (ROXICODONE) 5 MG/5ML solution Take 5 mLs (5 mg) by mouth every 4 hours as needed for moderate to severe pain (Patient not taking: Reported on 2/12/2020) 360 mL 0     Allergies   Allergen Reactions     Nuts Anaphylaxis     No Clinical Screening - See Comments Rash     Nickel      No past medical history on file.  Past Surgical History:   Procedure Laterality Date     ESOPHAGOSCOPY, GASTROSCOPY, DUODENOSCOPY (EGD), COMBINED N/A 2/17/2016    Procedure: COMBINED ESOPHAGOSCOPY, GASTROSCOPY, DUODENOSCOPY (EGD);  Surgeon: Arnav Schmid MD;  Location:  OR     ESOPHAGOSCOPY, GASTROSCOPY, DUODENOSCOPY (EGD), COMBINED N/A 2/17/2016    Procedure: COMBINED ESOPHAGOSCOPY, GASTROSCOPY, DUODENOSCOPY (EGD), BIOPSY SINGLE OR MULTIPLE;  Surgeon: Arnav Schmid MD;  Location:  OR     NO HISTORY OF SURGERY       TONSILLECTOMY,  ADENOIDECTOMY, COMBINED Bilateral 3/25/2019    Procedure: BILATERAL  TONSILLECTOMY;  Surgeon: Giovanni Travis MD;  Location: [facility] OR      No family history on file.  Social History     Socioeconomic History     Marital status: Single     Spouse name: None     Number of children: None     Years of education: None     Highest education level: None   Occupational History     None   Social Needs     Financial resource strain: None     Food insecurity:     Worry: None     Inability: None     Transportation needs:     Medical: None     Non-medical: None   Tobacco Use     Smoking status: Never Smoker     Smokeless tobacco: Never Used   Substance and Sexual Activity     Alcohol use: No     Drug use: No     Sexual activity: Yes     Partners: Male     Birth control/protection: Condom   Lifestyle     Physical activity:     Days per week: None     Minutes per session: None     Stress: None   Relationships     Social connections:     Talks on phone: None     Gets together: None     Attends Scientologist service: None     Active member of club or organization: None     Attends meetings of clubs or organizations: None     Relationship status: None     Intimate partner violence:     Fear of current or ex partner: None     Emotionally abused: None     Physically abused: None     Forced sexual activity: None   Other Topics Concern     Parent/sibling w/ CABG, MI or angioplasty before 65F 55M? Not Asked   Social History Narrative     None       REVIEW OF SYSTEMS  =================  C: NEGATIVE for fever, chills, change in weight  I: NEGATIVE for worrisome rashes, moles or lesions  E/M: NEGATIVE for ear, mouth and throat problems  R: NEGATIVE for significant cough or SHORTNESS OF BREATH  CV:  NEGATIVE for chest pain, palpitations or peripheral edema  GI: NEGATIVE for nausea, abdominal pain, heartburn, or change in bowel habits  NEURO: NEGATIVE numbness/weakness  : see HPI  PSYCH: NEGATIVE depression/anxiety  LYmph: no new enlarged lymph  nodes  Ortho: no new trauma/movements      Physical Exam:  /77 (BP Location: Right arm, Patient Position: Sitting, Cuff Size: Adult Large)   Pulse 94   Wt 106.6 kg (235 lb 1.6 oz)   SpO2 96%   BMI 40.63 kg/m     Patient is pleasant, in no acute distress, good general condition.  Heart:  negative, PMI normal  Lung: no evidence of respiratory distress    Abdomen: Soft, nondistended, non tender. No masses. No rebound or guarding.   Exam: no cva tenderness  Skin: Warm and dry.  No redness.  Neuro: grossly normal  Musculaskeletal: moving all extremities  Psych normal mood and affect  Musculoskeletal  moving all extremities  Hematologic/Lymphatic/Immunologic: normal ant/post cervical, axillary, supraclavicular and inguinal nodes    Assessment/Plan:   19-year-old female who is seen recently for left renal colic due to a 2 mm stone in the left distal ureter.  Since then she has intermittent low back pain on both sides.  She has no evidence of fever nausea vomiting or hematuria.  Her stone is quite small and most likely she should be able to pass it without any difficulty.  I suspect that her low back pain now might be due to musculoskeletal rather than renal colic.  Will obtain a UA today for further evaluation.  If there is any blood in the urine and maybe she has not passed it yet.  If her urine test is negative no further intervention is needed.

## 2020-02-13 ENCOUNTER — TELEPHONE (OUTPATIENT)
Dept: UROLOGY | Facility: CLINIC | Age: 20
End: 2020-02-13

## 2020-02-13 DIAGNOSIS — N20.1 URETERAL STONE: Primary | ICD-10-CM

## 2020-02-13 NOTE — TELEPHONE ENCOUNTER
Called pt and left VM in regards to message below. Stated I would send her a my chart message as well.    Tiara Tsai RN Specialty Triage 2/13/2020 9:49 AM

## 2020-02-13 NOTE — TELEPHONE ENCOUNTER
----- Message from Sony Stubbs MD sent at 2/13/2020  8:29 AM CST -----  Please CC patient of results.  See me in one month with repeat UA/KUB

## 2020-02-16 ENCOUNTER — HEALTH MAINTENANCE LETTER (OUTPATIENT)
Age: 20
End: 2020-02-16

## 2020-03-12 ENCOUNTER — ANCILLARY PROCEDURE (OUTPATIENT)
Dept: GENERAL RADIOLOGY | Facility: CLINIC | Age: 20
End: 2020-03-12
Attending: UROLOGY
Payer: COMMERCIAL

## 2020-03-12 DIAGNOSIS — N20.1 URETERAL STONE: ICD-10-CM

## 2020-03-12 PROCEDURE — 74019 RADEX ABDOMEN 2 VIEWS: CPT

## 2020-07-17 ENCOUNTER — APPOINTMENT (OUTPATIENT)
Dept: CT IMAGING | Facility: CLINIC | Age: 20
End: 2020-07-17
Attending: NURSE PRACTITIONER
Payer: COMMERCIAL

## 2020-07-17 ENCOUNTER — HOSPITAL ENCOUNTER (OUTPATIENT)
Facility: CLINIC | Age: 20
Setting detail: OBSERVATION
Discharge: HOME | End: 2020-07-17
Attending: EMERGENCY MEDICINE | Admitting: EMERGENCY MEDICINE
Payer: COMMERCIAL

## 2020-07-17 ENCOUNTER — APPOINTMENT (OUTPATIENT)
Dept: ULTRASOUND IMAGING | Facility: CLINIC | Age: 20
End: 2020-07-17
Attending: EMERGENCY MEDICINE
Payer: COMMERCIAL

## 2020-07-17 VITALS
TEMPERATURE: 97.9 F | SYSTOLIC BLOOD PRESSURE: 110 MMHG | BODY MASS INDEX: 42.34 KG/M2 | DIASTOLIC BLOOD PRESSURE: 78 MMHG | RESPIRATION RATE: 16 BRPM | HEIGHT: 64 IN | WEIGHT: 248 LBS | HEART RATE: 88 BPM | OXYGEN SATURATION: 99 %

## 2020-07-17 DIAGNOSIS — Z20.828 EXPOSURE TO SARS-ASSOCIATED CORONAVIRUS: ICD-10-CM

## 2020-07-17 DIAGNOSIS — J35.01 CHRONIC TONSILLITIS: ICD-10-CM

## 2020-07-17 DIAGNOSIS — N12 PYELONEPHRITIS: ICD-10-CM

## 2020-07-17 PROBLEM — R10.9 FLANK PAIN: Status: ACTIVE | Noted: 2020-07-17

## 2020-07-17 LAB
ALBUMIN SERPL-MCNC: 3.6 G/DL (ref 3.4–5)
ALBUMIN UR-MCNC: NEGATIVE MG/DL
ALP SERPL-CCNC: 113 U/L (ref 40–150)
ALT SERPL W P-5'-P-CCNC: 36 U/L (ref 0–50)
AMORPH CRY #/AREA URNS HPF: ABNORMAL /HPF
ANION GAP SERPL CALCULATED.3IONS-SCNC: 5 MMOL/L (ref 3–14)
ANION GAP SERPL CALCULATED.3IONS-SCNC: 6 MMOL/L (ref 3–14)
APPEARANCE UR: ABNORMAL
AST SERPL W P-5'-P-CCNC: 16 U/L (ref 0–45)
BACTERIA #/AREA URNS HPF: ABNORMAL /HPF
BASOPHILS # BLD AUTO: 0.1 10E9/L (ref 0–0.2)
BASOPHILS NFR BLD AUTO: 0.6 %
BILIRUB SERPL-MCNC: 0.2 MG/DL (ref 0.2–1.3)
BILIRUB UR QL STRIP: NEGATIVE
BUN SERPL-MCNC: 11 MG/DL (ref 7–30)
BUN SERPL-MCNC: 12 MG/DL (ref 7–30)
CALCIUM SERPL-MCNC: 8.8 MG/DL (ref 8.5–10.1)
CALCIUM SERPL-MCNC: 9 MG/DL (ref 8.5–10.1)
CHLORIDE SERPL-SCNC: 108 MMOL/L (ref 94–109)
CHLORIDE SERPL-SCNC: 110 MMOL/L (ref 94–109)
CO2 SERPL-SCNC: 24 MMOL/L (ref 20–32)
CO2 SERPL-SCNC: 24 MMOL/L (ref 20–32)
COLOR UR AUTO: YELLOW
CREAT SERPL-MCNC: 0.82 MG/DL (ref 0.52–1.04)
CREAT SERPL-MCNC: 0.84 MG/DL (ref 0.52–1.04)
DIFFERENTIAL METHOD BLD: ABNORMAL
EOSINOPHIL # BLD AUTO: 0.2 10E9/L (ref 0–0.7)
EOSINOPHIL NFR BLD AUTO: 1.1 %
ERYTHROCYTE [DISTWIDTH] IN BLOOD BY AUTOMATED COUNT: 13 % (ref 10–15)
ERYTHROCYTE [DISTWIDTH] IN BLOOD BY AUTOMATED COUNT: 13.1 % (ref 10–15)
GFR SERPL CREATININE-BSD FRML MDRD: >90 ML/MIN/{1.73_M2}
GFR SERPL CREATININE-BSD FRML MDRD: >90 ML/MIN/{1.73_M2}
GLUCOSE SERPL-MCNC: 123 MG/DL (ref 70–99)
GLUCOSE SERPL-MCNC: 90 MG/DL (ref 70–99)
GLUCOSE UR STRIP-MCNC: NEGATIVE MG/DL
HCG SERPL QL: NEGATIVE
HCT VFR BLD AUTO: 38.8 % (ref 35–47)
HCT VFR BLD AUTO: 40.5 % (ref 35–47)
HGB BLD-MCNC: 12.3 G/DL (ref 11.7–15.7)
HGB BLD-MCNC: 13.1 G/DL (ref 11.7–15.7)
HGB UR QL STRIP: ABNORMAL
IMM GRANULOCYTES # BLD: 0.1 10E9/L (ref 0–0.4)
IMM GRANULOCYTES NFR BLD: 0.7 %
KETONES UR STRIP-MCNC: NEGATIVE MG/DL
LACTATE BLD-SCNC: 1.1 MMOL/L (ref 0.7–2)
LEUKOCYTE ESTERASE UR QL STRIP: ABNORMAL
LIPASE SERPL-CCNC: 58 U/L (ref 73–393)
LYMPHOCYTES # BLD AUTO: 3.8 10E9/L (ref 0.8–5.3)
LYMPHOCYTES NFR BLD AUTO: 23.8 %
MCH RBC QN AUTO: 27.2 PG (ref 26.5–33)
MCH RBC QN AUTO: 27.8 PG (ref 26.5–33)
MCHC RBC AUTO-ENTMCNC: 31.7 G/DL (ref 31.5–36.5)
MCHC RBC AUTO-ENTMCNC: 32.3 G/DL (ref 31.5–36.5)
MCV RBC AUTO: 86 FL (ref 78–100)
MCV RBC AUTO: 86 FL (ref 78–100)
MONOCYTES # BLD AUTO: 1.3 10E9/L (ref 0–1.3)
MONOCYTES NFR BLD AUTO: 8.5 %
NEUTROPHILS # BLD AUTO: 10.3 10E9/L (ref 1.6–8.3)
NEUTROPHILS NFR BLD AUTO: 65.3 %
NITRATE UR QL: POSITIVE
NRBC # BLD AUTO: 0 10*3/UL
NRBC BLD AUTO-RTO: 0 /100
PH UR STRIP: 6.5 PH (ref 5–7)
PLATELET # BLD AUTO: 320 10E9/L (ref 150–450)
PLATELET # BLD AUTO: 356 10E9/L (ref 150–450)
POTASSIUM SERPL-SCNC: 3.5 MMOL/L (ref 3.4–5.3)
POTASSIUM SERPL-SCNC: 3.9 MMOL/L (ref 3.4–5.3)
PROT SERPL-MCNC: 7.5 G/DL (ref 6.8–8.8)
RBC # BLD AUTO: 4.53 10E12/L (ref 3.8–5.2)
RBC # BLD AUTO: 4.72 10E12/L (ref 3.8–5.2)
RBC #/AREA URNS AUTO: 2 /HPF (ref 0–2)
SARS-COV-2 RNA SPEC QL NAA+PROBE: NOT DETECTED
SODIUM SERPL-SCNC: 138 MMOL/L (ref 133–144)
SODIUM SERPL-SCNC: 140 MMOL/L (ref 133–144)
SOURCE: ABNORMAL
SP GR UR STRIP: 1.02 (ref 1–1.03)
SPECIMEN SOURCE: NORMAL
SQUAMOUS #/AREA URNS AUTO: 4 /HPF (ref 0–1)
UROBILINOGEN UR STRIP-MCNC: NORMAL MG/DL (ref 0–2)
WBC # BLD AUTO: 14.5 10E9/L (ref 4–11)
WBC # BLD AUTO: 15.8 10E9/L (ref 4–11)
WBC #/AREA URNS AUTO: 3 /HPF (ref 0–5)

## 2020-07-17 PROCEDURE — 87086 URINE CULTURE/COLONY COUNT: CPT | Performed by: EMERGENCY MEDICINE

## 2020-07-17 PROCEDURE — 80053 COMPREHEN METABOLIC PANEL: CPT | Performed by: EMERGENCY MEDICINE

## 2020-07-17 PROCEDURE — 81001 URINALYSIS AUTO W/SCOPE: CPT | Performed by: EMERGENCY MEDICINE

## 2020-07-17 PROCEDURE — 40000556 ZZH STATISTIC PERIPHERAL IV START W US GUIDANCE

## 2020-07-17 PROCEDURE — 25000132 ZZH RX MED GY IP 250 OP 250 PS 637: Performed by: NURSE PRACTITIONER

## 2020-07-17 PROCEDURE — 99220 ZZC INITIAL OBSERVATION CARE,LEVL III: CPT | Mod: Z6 | Performed by: NURSE PRACTITIONER

## 2020-07-17 PROCEDURE — G0378 HOSPITAL OBSERVATION PER HR: HCPCS

## 2020-07-17 PROCEDURE — 25000128 H RX IP 250 OP 636: Performed by: NURSE PRACTITIONER

## 2020-07-17 PROCEDURE — 74176 CT ABD & PELVIS W/O CONTRAST: CPT

## 2020-07-17 PROCEDURE — 85027 COMPLETE CBC AUTOMATED: CPT | Performed by: NURSE PRACTITIONER

## 2020-07-17 PROCEDURE — 83690 ASSAY OF LIPASE: CPT | Performed by: EMERGENCY MEDICINE

## 2020-07-17 PROCEDURE — 76770 US EXAM ABDO BACK WALL COMP: CPT

## 2020-07-17 PROCEDURE — 96365 THER/PROPH/DIAG IV INF INIT: CPT

## 2020-07-17 PROCEDURE — 25000132 ZZH RX MED GY IP 250 OP 250 PS 637: Performed by: EMERGENCY MEDICINE

## 2020-07-17 PROCEDURE — 96376 TX/PRO/DX INJ SAME DRUG ADON: CPT

## 2020-07-17 PROCEDURE — 36415 COLL VENOUS BLD VENIPUNCTURE: CPT | Performed by: NURSE PRACTITIONER

## 2020-07-17 PROCEDURE — 80048 BASIC METABOLIC PNL TOTAL CA: CPT | Performed by: NURSE PRACTITIONER

## 2020-07-17 PROCEDURE — U0003 INFECTIOUS AGENT DETECTION BY NUCLEIC ACID (DNA OR RNA); SEVERE ACUTE RESPIRATORY SYNDROME CORONAVIRUS 2 (SARS-COV-2) (CORONAVIRUS DISEASE [COVID-19]), AMPLIFIED PROBE TECHNIQUE, MAKING USE OF HIGH THROUGHPUT TECHNOLOGIES AS DESCRIBED BY CMS-2020-01-R: HCPCS | Performed by: EMERGENCY MEDICINE

## 2020-07-17 PROCEDURE — 96375 TX/PRO/DX INJ NEW DRUG ADDON: CPT

## 2020-07-17 PROCEDURE — 83605 ASSAY OF LACTIC ACID: CPT | Performed by: EMERGENCY MEDICINE

## 2020-07-17 PROCEDURE — C9803 HOPD COVID-19 SPEC COLLECT: HCPCS

## 2020-07-17 PROCEDURE — 99285 EMERGENCY DEPT VISIT HI MDM: CPT | Mod: 25

## 2020-07-17 PROCEDURE — 84703 CHORIONIC GONADOTROPIN ASSAY: CPT | Performed by: EMERGENCY MEDICINE

## 2020-07-17 PROCEDURE — 85025 COMPLETE CBC W/AUTO DIFF WBC: CPT | Performed by: EMERGENCY MEDICINE

## 2020-07-17 PROCEDURE — 25000128 H RX IP 250 OP 636: Performed by: EMERGENCY MEDICINE

## 2020-07-17 PROCEDURE — 25800030 ZZH RX IP 258 OP 636: Performed by: NURSE PRACTITIONER

## 2020-07-17 RX ORDER — TAMSULOSIN HYDROCHLORIDE 0.4 MG/1
0.4 CAPSULE ORAL DAILY
Status: DISCONTINUED | OUTPATIENT
Start: 2020-07-17 | End: 2020-07-17 | Stop reason: HOSPADM

## 2020-07-17 RX ORDER — ACETAMINOPHEN 650 MG/1
650 SUPPOSITORY RECTAL EVERY 4 HOURS PRN
Status: DISCONTINUED | OUTPATIENT
Start: 2020-07-17 | End: 2020-07-17 | Stop reason: HOSPADM

## 2020-07-17 RX ORDER — LIDOCAINE 40 MG/G
CREAM TOPICAL
Status: DISCONTINUED | OUTPATIENT
Start: 2020-07-17 | End: 2020-07-17 | Stop reason: HOSPADM

## 2020-07-17 RX ORDER — ACETAMINOPHEN 325 MG/1
650 TABLET ORAL EVERY 4 HOURS PRN
Status: DISCONTINUED | OUTPATIENT
Start: 2020-07-17 | End: 2020-07-17 | Stop reason: HOSPADM

## 2020-07-17 RX ORDER — NALOXONE HYDROCHLORIDE 0.4 MG/ML
.1-.4 INJECTION, SOLUTION INTRAMUSCULAR; INTRAVENOUS; SUBCUTANEOUS
Status: DISCONTINUED | OUTPATIENT
Start: 2020-07-17 | End: 2020-07-17 | Stop reason: HOSPADM

## 2020-07-17 RX ORDER — ONDANSETRON 4 MG/1
4 TABLET, ORALLY DISINTEGRATING ORAL EVERY 6 HOURS PRN
Qty: 12 TABLET | Refills: 0 | Status: SHIPPED | OUTPATIENT
Start: 2020-07-17 | End: 2020-07-17

## 2020-07-17 RX ORDER — ONDANSETRON 4 MG/1
4 TABLET, ORALLY DISINTEGRATING ORAL EVERY 6 HOURS PRN
Qty: 12 TABLET | Refills: 0 | COMMUNITY
Start: 2020-07-17 | End: 2020-07-28

## 2020-07-17 RX ORDER — SODIUM CHLORIDE 9 MG/ML
INJECTION, SOLUTION INTRAVENOUS CONTINUOUS
Status: DISCONTINUED | OUTPATIENT
Start: 2020-07-17 | End: 2020-07-17 | Stop reason: HOSPADM

## 2020-07-17 RX ORDER — KETOROLAC TROMETHAMINE 30 MG/ML
30 INJECTION, SOLUTION INTRAMUSCULAR; INTRAVENOUS EVERY 6 HOURS PRN
Status: DISCONTINUED | OUTPATIENT
Start: 2020-07-17 | End: 2020-07-17 | Stop reason: HOSPADM

## 2020-07-17 RX ORDER — KETOROLAC TROMETHAMINE 15 MG/ML
15 INJECTION, SOLUTION INTRAMUSCULAR; INTRAVENOUS ONCE
Status: COMPLETED | OUTPATIENT
Start: 2020-07-17 | End: 2020-07-17

## 2020-07-17 RX ORDER — CEFTRIAXONE 1 G/1
1 INJECTION, POWDER, FOR SOLUTION INTRAMUSCULAR; INTRAVENOUS EVERY 24 HOURS
Status: DISCONTINUED | OUTPATIENT
Start: 2020-07-18 | End: 2020-07-17

## 2020-07-17 RX ORDER — HYDROMORPHONE HCL/0.9% NACL/PF 0.2MG/0.2
0.2 SYRINGE (ML) INTRAVENOUS
Status: DISCONTINUED | OUTPATIENT
Start: 2020-07-17 | End: 2020-07-17

## 2020-07-17 RX ORDER — CEFTRIAXONE 1 G/1
1 INJECTION, POWDER, FOR SOLUTION INTRAMUSCULAR; INTRAVENOUS EVERY 24 HOURS
Status: DISCONTINUED | OUTPATIENT
Start: 2020-07-17 | End: 2020-07-17 | Stop reason: HOSPADM

## 2020-07-17 RX ORDER — ACETAMINOPHEN 325 MG/1
650 TABLET ORAL EVERY 4 HOURS PRN
COMMUNITY
Start: 2020-07-17 | End: 2020-08-25

## 2020-07-17 RX ORDER — OXYCODONE HYDROCHLORIDE 5 MG/1
5 TABLET ORAL EVERY 4 HOURS PRN
Status: DISCONTINUED | OUTPATIENT
Start: 2020-07-17 | End: 2020-07-17

## 2020-07-17 RX ORDER — CEFTRIAXONE 1 G/1
1 INJECTION, POWDER, FOR SOLUTION INTRAMUSCULAR; INTRAVENOUS ONCE
Status: COMPLETED | OUTPATIENT
Start: 2020-07-17 | End: 2020-07-17

## 2020-07-17 RX ORDER — ONDANSETRON 2 MG/ML
4 INJECTION INTRAMUSCULAR; INTRAVENOUS EVERY 6 HOURS PRN
Status: DISCONTINUED | OUTPATIENT
Start: 2020-07-17 | End: 2020-07-17 | Stop reason: HOSPADM

## 2020-07-17 RX ORDER — CEFDINIR 300 MG/1
300 CAPSULE ORAL 2 TIMES DAILY
Qty: 16 CAPSULE | Refills: 0 | Status: SHIPPED | OUTPATIENT
Start: 2020-07-17 | End: 2020-07-28

## 2020-07-17 RX ORDER — ONDANSETRON 4 MG/1
4 TABLET, ORALLY DISINTEGRATING ORAL EVERY 6 HOURS PRN
Status: DISCONTINUED | OUTPATIENT
Start: 2020-07-17 | End: 2020-07-17 | Stop reason: HOSPADM

## 2020-07-17 RX ORDER — OXYCODONE HYDROCHLORIDE 5 MG/1
5 TABLET ORAL EVERY 4 HOURS PRN
Status: DISCONTINUED | OUTPATIENT
Start: 2020-07-17 | End: 2020-07-17 | Stop reason: HOSPADM

## 2020-07-17 RX ORDER — ACETAMINOPHEN 500 MG
1000 TABLET ORAL ONCE
Status: COMPLETED | OUTPATIENT
Start: 2020-07-17 | End: 2020-07-17

## 2020-07-17 RX ORDER — OXYCODONE HYDROCHLORIDE 5 MG/1
5 TABLET ORAL EVERY 4 HOURS PRN
Qty: 10 TABLET | Refills: 0 | Status: SHIPPED | OUTPATIENT
Start: 2020-07-17 | End: 2020-07-28

## 2020-07-17 RX ADMIN — KETOROLAC TROMETHAMINE 15 MG: 15 INJECTION, SOLUTION INTRAMUSCULAR; INTRAVENOUS at 01:41

## 2020-07-17 RX ADMIN — OXYCODONE HYDROCHLORIDE 5 MG: 5 TABLET ORAL at 03:31

## 2020-07-17 RX ADMIN — ACETAMINOPHEN 1000 MG: 500 TABLET, FILM COATED ORAL at 01:40

## 2020-07-17 RX ADMIN — ONDANSETRON 4 MG: 4 TABLET, ORALLY DISINTEGRATING ORAL at 07:41

## 2020-07-17 RX ADMIN — SODIUM CHLORIDE: 9 INJECTION, SOLUTION INTRAVENOUS at 04:06

## 2020-07-17 RX ADMIN — TAMSULOSIN HYDROCHLORIDE 0.4 MG: 0.4 CAPSULE ORAL at 07:41

## 2020-07-17 RX ADMIN — CEFTRIAXONE 1 G: 1 INJECTION, POWDER, FOR SOLUTION INTRAMUSCULAR; INTRAVENOUS at 08:35

## 2020-07-17 RX ADMIN — CEFTRIAXONE 1 G: 1 INJECTION, POWDER, FOR SOLUTION INTRAMUSCULAR; INTRAVENOUS at 01:55

## 2020-07-17 ASSESSMENT — MIFFLIN-ST. JEOR: SCORE: 1874.92

## 2020-07-17 NOTE — PROGRESS NOTES
The patient was admitted to ED observation unit for pyelonephritis.  She says she has been having left flank pain, with nausea.  Elevated wbc noted. 15.8 initially now 14.5   Urine appears infected.  UC sent.  She is currently on rocephin IV.  She is afebrile.  She says she was nauseated this morning but zofran helped.  She says her pain is down to 1/10.  CT stone protocol is ordered for today.  If continues to feel better, she may be able to discharge later today.     Epic review shows positive UC in 2017 that grew out e coli and was pan sensitive. Given she is feeling better with continue with rocephin and consider transitioning to oral cephalosporin for discharge.

## 2020-07-17 NOTE — ED PROVIDER NOTES
[place] EMERGENCY DEPARTMENT ([facility])  7/17/20    History     Chief Complaint   Patient presents with     Flank Pain     HPI  Ximena Diaz is a 19 year old female with a PMH of kidney stones, reflux, et al., who presents to the ED for evaluation of left flank pain.     Pt reports having intermittent issues w/ kidney stones since last fall. No hx of pyelonephritis. Was driving today and had acute return of severe left flank pain, as bad as last fall, to the point where she had to pull over while driving nad presents now for eval/management of such. Right side doing ok. Nothing makes better or worse. No hematuria, no particular dysuria. No VB/discharge. No change in bowel. No fevers.  Was associated w/ nausea when pain at the worse, but no vomiting. No fevers or chills. No chest or respiratory symptoms. No rashes or skin changes. No MSK/extremity symptoms. No LH dizziness, syncope or near syncope.  No other symptoms or complaints at this time. Please see ROS for further details.        CT Abdomen Pelvis Impressions (1/24/2020):  1. 0.2 cm distal left ureteral calculus at the ureterovesicular junction,  resulting in mild obstruction.  2. Tiny nonobstructing right renal calculus.    I have reviewed the Medications, Allergies, Past Medical and Surgical History, and Social History in the [employer] system.    PAST MEDICAL HISTORY: History reviewed. No pertinent past medical history.    PAST SURGICAL HISTORY:   Past Surgical History:   Procedure Laterality Date     ESOPHAGOSCOPY, GASTROSCOPY, DUODENOSCOPY (EGD), COMBINED N/A 2/17/2016    Procedure: COMBINED ESOPHAGOSCOPY, GASTROSCOPY, DUODENOSCOPY (EGD);  Surgeon: Arnav Schmid MD;  Location:  OR     ESOPHAGOSCOPY, GASTROSCOPY, DUODENOSCOPY (EGD), COMBINED N/A 2/17/2016    Procedure: COMBINED ESOPHAGOSCOPY, GASTROSCOPY, DUODENOSCOPY (EGD), BIOPSY SINGLE OR MULTIPLE;  Surgeon: Arnav Schmid MD;  Location:  OR     NO HISTORY OF SURGERY       TONSILLECTOMY,  ADENOIDECTOMY, COMBINED Bilateral 3/25/2019    Procedure: BILATERAL  TONSILLECTOMY;  Surgeon: Giovanni Travis MD;  Location: [facility] OR       Past medical history, past surgical history, medications, and allergies were reviewed with the patient.     FAMILY HISTORY: History reviewed. No pertinent family history.    SOCIAL HISTORY:   Social History     Tobacco Use     Smoking status: Never Smoker     Smokeless tobacco: Never Used   Substance Use Topics     Alcohol use: Yes     Comment: on weekends     Social history was reviewed with the patient. }      Patient's Medications   New Prescriptions    No medications on file   Previous Medications    ETONOGESTREL (IMPLANON/NEXPLANON) 68 MG IMPL    1 each (68 mg) by Subdermal route continuous    FEXOFENADINE (ALLEGRA) 180 MG TABLET    Take 1 tablet (180 mg) by mouth daily    HYDROCODONE-ACETAMINOPHEN (NORCO) 5-325 MG TABLET    Take 1 tablet by mouth as needed    ONDANSETRON (ZOFRAN-ODT) 4 MG ODT TAB    Take 1 tablet (4 mg) by mouth every 8 hours as needed for nausea or vomiting    OXYCODONE (ROXICODONE) 5 MG/5ML SOLUTION    Take 5 mLs (5 mg) by mouth every 4 hours as needed for moderate to severe pain    TAMSULOSIN (FLOMAX) 0.4 MG CAPSULE    Take 1 capsule (0.4 mg) by mouth daily   Modified Medications    No medications on file   Discontinued Medications    No medications on file          Allergies   Allergen Reactions     Nuts Anaphylaxis     No Clinical Screening - See Comments Rash     Nickel         Review of Systems   Constitutional: Positive for fatigue. Negative for chills, diaphoresis and fever.   Respiratory: Negative for cough and shortness of breath.    Cardiovascular: Negative for chest pain and palpitations.   Gastrointestinal: Positive for abdominal pain (left flank) and nausea. Negative for constipation and diarrhea.   Genitourinary: Positive for flank pain (left). Negative for dysuria, frequency, hematuria, urgency, vaginal bleeding and vaginal discharge.  "  Musculoskeletal: Positive for back pain (left flank). Negative for neck pain and neck stiffness.   Skin: Negative for color change and rash.   Allergic/Immunologic: Negative for immunocompromised state.   Neurological: Negative for syncope and weakness.   All other systems reviewed and are negative.        Physical Exam   BP: (!) 134/92  Temp: 97.8  F (36.6  C)  Resp: 16  Height: 162.6 cm (5' 4\")      Physical Exam  CONSTITUTIONAL: Well-developed and well-nourished. Awake and alert. Non-toxic appearance. No acute distress.   HENT:   - Head: Normocephalic and atraumatic.   - Ears: Hearing and external ear grossly normal.   - Nose: Nose normal. No rhinorrhea. No epistaxis.   - Mouth/Throat: MMM  EYES: Conjunctivae and lids are normal. No scleral icterus.   NECK: Normal range of motion and phonation normal. Neck supple.  No tracheal deviation, no stridor. No edema or erythema noted.  CARDIOVASCULAR: Normal rate, regular rhythm and no appreciable abnormal heart sounds.  PULMONARY/CHEST: Normal work of breathing. No accessory muscle usage or stridor. No respiratory distress.  No appreciable abnormal breath sounds.  ABDOMEN: Soft, non-distended. Mild left lateral flank tenderness. No peritoneal findings, no rigidity, rebound or guarding.   MUSCULOSKELETAL: Extremities warm and seemingly well perfused. No edema or calf tenderness. Mild left flank pain.   NEUROLOGIC: Awake, alert. Not disoriented. She displays no atrophy and no tremor. Normal tone. No seizure activity. GCS 15  SKIN: Skin is warm and dry. No rash noted. No diaphoresis. No pallor.   PSYCHIATRIC: Normal mood and affect. Speech and behavior normal. Thought processes linear. Cognition and memory are normal.       Assessments & Plan (with Medical Decision Making)   IMPRESSION: 19 year old female w/ PMH notable for kidney stones, reflux, et al., who presents to the ED for evaluation of left flank pain, as described above. Clinically appears well, well hydrated, " NAD, nontoxic. Some mild left flank pain on exam, but no peritoneal findings, no other acute findings on exam. Ddx includes, but not limited to, kidney stones, pyelonephritis, MSK related pain, colitis, enteritis, et al. No sx's for occult thoracic cause, etc.     PLAN: labs, urine studies, renal US, symptom management, dispo as per ED course  - Risks/benefits of pursuing imaging reviewed and accepted.     RESULTS:  - Labs: Does have leukocytosis, otherwise no acute findings  - Urine: Does have Large LE and nitrates, though not a lot of WBCs in urine, culture pending  - Imaging: Written preliminary reports reviewed:  --- Abdominal US: No acute findings, no hydronephrosis, ureteral jets   --- Results/reports reviewed w/ patient who expresses understanding of findings and F/U recommendations.    INTERVENTIONS:   - PO Tylenol  - IV Toradol  - IV Ceftriaxone  - Pt would like more pain relief but hesitant to use opioids    RE-EVALUATION:  - The patient is still quite uncomfortable without significant improvement, but reports is concerned bc of previous adverse reactions to opiates in the past. She is thinking about whether would want to risk those effects for additiona pain relief. Doesn't think can manage at home  - Pt otherwise continues to do well here in the ED, no acute issues or apparent concerning changes in vitals or clinical appearance.    DISCUSSIONS:  - w/ ED OU: Reviewed patient/presentation, current state of workup/any pending studies. They will admit for further evaluation/management, F/U pending studies as needed, coordinate w/ consulting services as needed. No additional requests/recommendations for workup/management for in the ED at this time.   - w/ Patient: I have reviewed the available findings, plan with the patient and her loved one/guest. They expressed understanding and agreement with this plan. All questions answered to the best of our ability at this time.     DISPOSITION/PLANNING:  -  IMPRESSION: Left flank pain, leukocytosis, infecious appaering urine; pyelonephritis  - DISPOSITION: Admit to EDOU for obs, pain management  - PENDING: Urine culture  - RECOMMENDATIONS: Conservative symptom management, transition to PO regiment strict return instructions      ______________________________________________________________________    7/17/2020   [facility], EMERGENCY DEPARTMENT     Sinai Epperson MD  07/18/20 [id number]

## 2020-07-17 NOTE — DISCHARGE SUMMARY
"ED Observation Discharge Summary    Ximena Diaz   MRN# [id number]  Age: 19 year old   YOB: 2000            Date of Admission: 07/17/2020    Date of Discharge: 07/17/2020  Admitting Physician: Dr. Sinai Epperson MD  Discharge Physician: Dr. Félix MD  NP/PA: Samantha Adam CNP    DISCHARGE DIAGNOSIS:   ##UTI; improving   ##Leukocytosis; improving   ##Nausea/Vomiting; chronic and stable per patient     INTERVAL HISTORY: VSS, afebrile. Up ad zainab. Notes vomiting is chronic for her. She has zofran at home she uses chronically. No acute changes. Feels ready to discharge to home. Drinking sufficient fluids.     PHYSICAL EXAM:   Blood pressure 110/78, pulse 88, temperature 97.9  F (36.6  C), temperature source Oral, resp. rate 16, height 1.626 m (5' 4\"), weight 112.5 kg (248 lb), SpO2 99 %, not currently breastfeeding.     GENERAL APPEARENCE:  A/O x4. NAD.  SKIN: Clean, dry, and intact  HEENT/NECK: NCAT. Sclera anicteric, PERRLA, EOMI.  Oral mucosa pink and moist without erythema, exudate, lesions, ulcerations, or thrush. Teeth and gums normal. Neck supple, no masses. No jugular venous distention.   CARDIOVASCULAR: S1, S2 RRR. No murmurs, rubs, or gallops.   RESPIRATORY: Respiratory effort WNL. CTA  bilaterally without crackles/rales/wheeze   GI: Active BS in all 4 quadrants. Abdomen soft and non-tender. No masses or hepatosplenomegaly.  : Deferred  MUSCULOSKELETAL:  Extremities normal, no gross deformities noted, non-tender to palpation.   PV: 2+ bilateral radial and pedal pulses. No edema noted.   NEURO: CN II-XII grossly intact. Speech normal. Appropriate throughout interview.   HEME/LYMPH: No visible bleeding.   PSYCHIATRIC: Mentation and affect appear normal  VASCULAR ACCESS: CDI without erythema or discharge. Non-tender.    PROCEDURES AND IMAGING:   Results for orders placed or performed during the hospital encounter of 07/17/20 (from the past 24 hour(s))   UA with Microscopic   Result Value Ref " Range    Color Urine Yellow     Appearance Urine Slightly Cloudy     Glucose Urine Negative NEG^Negative mg/dL    Bilirubin Urine Negative NEG^Negative    Ketones Urine Negative NEG^Negative mg/dL    Specific Gravity Urine 1.019 1.003 - 1.035    Blood Urine Small (A) NEG^Negative    pH Urine 6.5 5.0 - 7.0 pH    Protein Albumin Urine Negative NEG^Negative mg/dL    Urobilinogen mg/dL Normal 0.0 - 2.0 mg/dL    Nitrite Urine Positive (A) NEG^Negative    Leukocyte Esterase Urine Large (A) NEG^Negative    Source Midstream Urine     WBC Urine 3 0 - 5 /HPF    RBC Urine 2 0 - 2 /HPF    Bacteria Urine Many (A) NEG^Negative /HPF    Squamous Epithelial /HPF Urine 4 (H) 0 - 1 /HPF    Amorphous Crystals Moderate (A) NEG^Negative /HPF   Urine Culture    Specimen: Midstream Urine   Result Value Ref Range    Specimen Description Midstream Urine     Special Requests Specimen received in preservative     Culture Micro PENDING    CBC with platelets differential   Result Value Ref Range    WBC 15.8 (H) 4.0 - 11.0 10e9/L    RBC Count 4.72 3.8 - 5.2 10e12/L    Hemoglobin 13.1 11.7 - 15.7 g/dL    Hematocrit 40.5 35.0 - 47.0 %    MCV 86 78 - 100 fl    MCH 27.8 26.5 - 33.0 pg    MCHC 32.3 31.5 - 36.5 g/dL    RDW 13.0 10.0 - 15.0 %    Platelet Count 356 150 - 450 10e9/L    Diff Method Automated Method     % Neutrophils 65.3 %    % Lymphocytes 23.8 %    % Monocytes 8.5 %    % Eosinophils 1.1 %    % Basophils 0.6 %    % Immature Granulocytes 0.7 %    Nucleated RBCs 0 0 /100    Absolute Neutrophil 10.3 (H) 1.6 - 8.3 10e9/L    Absolute Lymphocytes 3.8 0.8 - 5.3 10e9/L    Absolute Monocytes 1.3 0.0 - 1.3 10e9/L    Absolute Eosinophils 0.2 0.0 - 0.7 10e9/L    Absolute Basophils 0.1 0.0 - 0.2 10e9/L    Abs Immature Granulocytes 0.1 0 - 0.4 10e9/L    Absolute Nucleated RBC 0.0    Comprehensive metabolic panel   Result Value Ref Range    Sodium 140 133 - 144 mmol/L    Potassium 3.9 3.4 - 5.3 mmol/L    Chloride 110 (H) 94 - 109 mmol/L    Carbon Dioxide  24 20 - 32 mmol/L    Anion Gap 5 3 - 14 mmol/L    Glucose 90 70 - 99 mg/dL    Urea Nitrogen 12 7 - 30 mg/dL    Creatinine 0.84 0.52 - 1.04 mg/dL    GFR Estimate >90 >60 mL/min/[1.73_m2]    GFR Estimate If Black >90 >60 mL/min/[1.73_m2]    Calcium 9.0 8.5 - 10.1 mg/dL    Bilirubin Total 0.2 0.2 - 1.3 mg/dL    Albumin 3.6 3.4 - 5.0 g/dL    Protein Total 7.5 6.8 - 8.8 g/dL    Alkaline Phosphatase 113 40 - 150 U/L    ALT 36 0 - 50 U/L    AST 16 0 - 45 U/L   Lactic acid whole blood   Result Value Ref Range    Lactic Acid 1.1 0.7 - 2.0 mmol/L   Lipase   Result Value Ref Range    Lipase 58 (L) 73 - 393 U/L   HCG qualitative Blood   Result Value Ref Range    HCG Qualitative Serum Negative NEG^Negative   US Renal Complete    Narrative    EXAMINATION: Renal ultrasound, 7/17/2020 1:53 AM     COMPARISON: 12/30/2015    HISTORY: Flank pain, leukocytosis    TECHNIQUE: The kidneys and bladder were scanned in the standard  fashion with specialized ultrasound transducer(s) using both gray  scale and limited color/spectral Doppler techniques.    FINDINGS:    Right kidney: Measures 11.1 cm in length. Parenchyma is of normal  thickness and echogenicity. No focal mass. No hydronephrosis.    Left kidney: Measures 12.0 cm in length. Parenchyma is of normal  thickness and echogenicity. No focal mass. No hydronephrosis.     Bladder: Incompletely distended. Bilateral ureteral jets are seen.      Impression    IMPRESSION: Normal sonographic appearance of the kidneys. No  hydronephrosis.    I have personally reviewed the examination and initial interpretation  and I agree with the findings.    KARINA HAILE, DO   CBC with platelets   Result Value Ref Range    WBC 14.5 (H) 4.0 - 11.0 10e9/L    RBC Count 4.53 3.8 - 5.2 10e12/L    Hemoglobin 12.3 11.7 - 15.7 g/dL    Hematocrit 38.8 35.0 - 47.0 %    MCV 86 78 - 100 fl    MCH 27.2 26.5 - 33.0 pg    MCHC 31.7 31.5 - 36.5 g/dL    RDW 13.1 10.0 - 15.0 %    Platelet Count 320 150 - 450 10e9/L   Basic  metabolic panel   Result Value Ref Range    Sodium 138 133 - 144 mmol/L    Potassium 3.5 3.4 - 5.3 mmol/L    Chloride 108 94 - 109 mmol/L    Carbon Dioxide 24 20 - 32 mmol/L    Anion Gap 6 3 - 14 mmol/L    Glucose 123 (H) 70 - 99 mg/dL    Urea Nitrogen 11 7 - 30 mg/dL    Creatinine 0.82 0.52 - 1.04 mg/dL    GFR Estimate >90 >60 mL/min/[1.73_m2]    GFR Estimate If Black >90 >60 mL/min/[1.73_m2]    Calcium 8.8 8.5 - 10.1 mg/dL   CT Abdomen Pelvis w/o Contrast    Narrative    EXAMINATION: CT of the abdomen and pelvis on 7/17/2020.    INDICATION: Flank pain. History of nephrolithiasis, now with UTI and  flank pain. Stone disease suspected.     COMPARISON: Same-day ultrasound.     TECHNIQUE: Axial images of the abdomen and pelvis were obtained  without contrast. Coronal reconstructions were provided. Images were  reviewed in bone, lung, and soft tissue windows.    Dose: 1235 mGy*cm    FINDINGS:    Lines and tubes: None.    Lower thorax:  No pulmonary consolidation in the lower lungs. Heart size is normal.  No pericardial effusion.    Abdomen and Pelvis:   The liver, gallbladder, spleen, pancreas and adrenal glands are  unremarkable. Nonobstructing 2 mm calyceal stone in the interpolar  right kidney. No stones in left kidney. No hydronephrosis or  hydroureter. No bowel dilation or wall thickening. The appendix is  normal. The bladder is partially filled. No bladder stones. Right  ovarian cyst. No intra-abdominal free air. Trace pelvic free fluid.    Vessels and lymph nodes:  Abdominal aorta is normal in caliber. Numerous prominent  retroperitoneal and mesenteric lymph nodes in the mid lower  abdomen/pelvis. Prominent bilateral iliac chain nodes measuring up to  6 mm in short axis. Bilateral inguinal nodes measuring up to 11 mm.    Bones and soft tissues:  No suspicious osseous lesions.      Impression    IMPRESSION:   1. Nonobstructing 2 mm calyceal stone in the right kidney. No  obstructing stones.  2. Numerous prominent  lymph nodes and increased surrounding fat  attenuation in the mid abdomen is suggestive of an underlying  infectious or inflammatory process.    I have personally reviewed the examination and initial interpretation  and I agree with the findings.    BLANCA CAROLINA MD     DISCHARGE MEDICATIONS:   Current Discharge Medication List      START taking these medications    Details   acetaminophen (TYLENOL) 325 MG tablet Take 2 tablets (650 mg) by mouth every 4 hours as needed for mild pain  Qty:      Associated Diagnoses: Pyelonephritis      cefdinir (OMNICEF) 300 MG capsule Take 1 capsule (300 mg) by mouth 2 times daily for 8 days  Qty: 16 capsule, Refills: 0    Associated Diagnoses: Pyelonephritis      oxyCODONE (ROXICODONE) 5 MG tablet Take 1 tablet (5 mg) by mouth every 4 hours as needed for moderate to severe pain  Qty: 10 tablet, Refills: 0    Associated Diagnoses: Pyelonephritis         CONTINUE these medications which have CHANGED    Details   ondansetron (ZOFRAN-ODT) 4 MG ODT tab Take 1 tablet (4 mg) by mouth every 6 hours as needed for nausea or vomiting  Qty: 12 tablet, Refills: 0    Associated Diagnoses: Pyelonephritis         CONTINUE these medications which have NOT CHANGED    Details   etonogestrel (IMPLANON/NEXPLANON) 68 MG IMPL 1 each (68 mg) by Subdermal route continuous  Refills: 0    Associated Diagnoses: Nexplanon insertion; Nexplanon in place      fexofenadine (ALLEGRA) 180 MG tablet Take 1 tablet (180 mg) by mouth daily  Qty: 30 tablet, Refills: 1    Associated Diagnoses: Seasonal allergic rhinitis      tamsulosin (FLOMAX) 0.4 MG capsule Take 1 capsule (0.4 mg) by mouth daily  Qty: 30 capsule, Refills: 0    Associated Diagnoses: Nephrolithiasis         STOP taking these medications       HYDROcodone-acetaminophen (NORCO) 5-325 MG tablet Comments:   Reason for Stopping:         nitroFURantoin macrocrystal-monohydrate (MACROBID) 100 MG capsule Comments:   Reason for Stopping:         oxyCODONE  "(ROXICODONE) 5 MG/5ML solution Comments:   Reason for Stopping:                 CONSULTATIONS:   Consultation during this admission received from:  N/A    BRIEF HISTORY OF PRESENT ILLNESS:   (Adopted from admission H&P).    \"Ximena Diaz is a 19 year old female with a past medical history of kidney stones who presents to the ED for evaluation of flank pain. She reports the pain is 10/10 in the left flank. She typically endures this sort of pain at home with Advil but this time the pain is too severe. She denies any urinary frequency, urgency, dysuria, or hematuria. She last had kidney stone in January 2020 and passed stone with flomax and hydrocodone, at home.      CT Abdomen Pelvis Impressions (1/24/2020):  1. 0.2 cm distal left ureteral calculus at the ureterovesicular junction,  resulting in mild obstruction.  2. Tiny nonobstructing right renal calculus.     In the ED, /92, , temp 97.8, RR 16, SPO2 99% on RA. Labs show WBC 15.8, Hgb 13.1, hematocrit 40.5. Na 140, K+ 3.9, Cr 0.84, BUN 12. Hcg serum negative. Lactic acid 1.1, Lipase 58. UA with positive nitrites, small blood, Large LE, 3 WBCs, \"Many bacteria\" urine. Urine culture pending. US shows Normal sonographic appearance of the kidneys. No Hydronephrosis. Toradol 15mg IV administered. Tylenol 1000mg once. Plan for admission to ED Obs for pain control and management of pyelonephritis.      On arrival in ED Obs, the patient was stable.\"    ED OBSERVATION COURSE: Ximena Diaz is a 19 year old female with a history of kidney stones who presents to the Emergency Dept with complaint of left flank pain.      ##UTI:   ##History of Nephrolithiasis:   WBC 15.8--> 14.5. UA with 3 WBC's, large LE and + nitrite. HD stable, afebrile. Ultrasound reveals no hydronephrosis and bilateral ureteral jets are seen. Urine culture is pending. Patient has tolerated a dose of Toradol 15mg IV x1 for flank pain and she has been given ceftriaxone IV x1. This am she " "vomited  x 1.  She is not tolerating pain well but is declining narcotics. Pt reports flank pain but denies other urinary complaints. Per chart review, she had 0.2cm left kidney stone at [facility] on CT in January 2020. Admitted to [facility] for  monitoring and management of UTI. Per Urology follow up note (2/12/2020), she had persistent flank pain after nephrolithiasis, thought to be MSK and not renal colic. CT Stone showed Nonobstructing 2 mm calyceal stone in the right kidney and numerous prominent lymph nodes and increased surrounding fat  attenuation in the mid abdomen is suggestive of an underlying infectious or inflammatory process. The patient improved throughout the day. She did note emesis during admission, which she states is chronic and unchanged. She uses zofran at home. While in observation she was treated with Ceftriaxone Q24hrs and will discharge with cefdinir 300 mg's BID to complete a 10  day treatment course. Follow UC. Strain urine. Resume home flomax. Tylenol/ibuprofen PRN, oxycodone PRN. She was offered longer observation stay but felt ready to discharge. She was instructed to return to the ED with fever, uncontrolled nausea, vomiting, unrelieved pain, bleeding not relieved with pressure, dizziness, chest pain, shortness of breath, loss of consciousness, and any new or concerning symptoms.     DISCHARGE DISPOSITION:   Discharged to home. The patient was discharged in a stable condition and agreed to discharge plan.    DISCHARGE INSTRUCTIONS AND FOLLOW-UP:  Discharge Procedure Orders   Reason for your hospital stay   Order Comments: You were admitted with flank pain. You were found to have a UTI. We did CT to evaluate for stone. You have a stone but it is inside the kidney. It has not moved to the ureter. We will treat your infection and recommend close follow-up with your primary care doctor and Urology.    CT Showed:  \"1. Nonobstructing 2 mm calyceal stone in the right kidney. No  obstructing " "stones.  2. Numerous prominent lymph nodes and increased surrounding fat  attenuation in the mid abdomen is suggestive of an underlying  infectious or inflammatory process.\"    Recommend follow-up for these prominent lymph nodes.     Adult [facility] Follow-up and recommended labs and tests   Order Comments: Follow up with primary care provider, Curtis Holley, within 7 days for hospital follow- up.  The following labs/tests are recommended: Follow-up UC.      Appointments on [place] and/or [facility] (with [facility] or [facility] provider or service). Call [phone] if you haven't heard regarding these appointments within 7 days of discharge.     Activity   Order Comments: Your activity upon discharge: activity as tolerated and no driving for today     Order Specific Question Answer Comments   Is discharge order? Yes      When to contact your care team   Order Comments: Return to the ED with fever, uncontrolled nausea, vomiting, unrelieved pain, bleeding not relieved with pressure, dizziness, chest pain, shortness of breath, loss of consciousness, and any new or concerning symptoms.     Diet   Order Comments: Follow this diet upon discharge: As tolerated   Be sure to drink plenty of non-caffeinated beverages.     Order Specific Question Answer Comments   Is discharge order? Yes       Attestation:   I have reviewed today's vital signs, notes, medications, labs and imaging.      JESICA Lima, CNP  Nurse Practitioner   Emergency Department Observation Unit      "

## 2020-07-17 NOTE — PROGRESS NOTES
"Outpatient/Observation goals to be met before discharge home:      - Diagnostic tests and consults completed and resulted if ordered: Not met  - Vital signs normal or at patient baseline:  Yes.   - Tolerating oral intake to maintain hydration: On-going, tolerating sips of water also on IVFs  - Adequate pain control on oral analgesia: Patient reports pain well controled at this time. Will continue to monitor.   - Tolerating oral antibiotics or has plans for home infusion setup: IV ABX running at this time.   - Infection is improving: Pending morning labs  - Safe disposition plan has been identified: Pending.     /78 (BP Location: Right arm)   Pulse 88   Temp 97.9  F (36.6  C) (Oral)   Resp 16   Ht 1.626 m (5' 4\")   Wt 112.5 kg (248 lb)   SpO2 99%   BMI 42.57 kg/m      "

## 2020-07-17 NOTE — PLAN OF CARE
"Outpatient/Observation goals to be met before discharge home:   BP (!) 131/92 (BP Location: Right arm)   Pulse 85   Temp 98.1  F (36.7  C) (Oral)   Resp 16   Ht 1.626 m (5' 4\")   Wt 112.5 kg (248 lb)   SpO2 99%   BMI 42.57 kg/m      - Diagnostic tests and consults completed and resulted if ordered: Not met  - Vital signs normal or at patient baseline: BP high, INDER aware, pt anxious about being in the hospital w/o family.  - Tolerating oral intake to maintain hydration: On-going, tolerating sips of water also on IVFs  - Adequate pain control on oral analgesia: On-going  - Tolerating oral antibiotics or has plans for home infusion setup: N/A  - Infection is improving: Pending morning labs  - Safe disposition plan has been identified: Not met  "

## 2020-07-17 NOTE — PROGRESS NOTES
[facility]  Daily Progress Note          Assessment & Plan:   Ximena Diaz is a 19 year old female with a history of kidney stones who presents to the Emergency Dept with complaint of left flank pain.      ##UTI:   ##History of Nephrolithiasis:   WBC 15.8--> 14.5. UA with 3 WBC's, large LE and + nitrite. HD stable, afebrile. Ultrasound reveals no hydronephrosis and bilateral ureteral jets are seen. Urine culture is pending. Patient has tolerated a dose of Toradol 15mg IV x1 for flank pain and she has been given ceftriaxone IV x1. This am she vomited  x 1.  She is not tolerating pain well but is declining narcotics. Pt reports flank pain but denies other urinary complaints. Per chart review, she had 0.2cm left kidney stone at [facility] on CT in January 2020. Admitted to [facility] for  monitoring and management of UTI. Per Urology follow up note (2/12/2020), she had persistent flank pain after nephrolithiasis, thought to be MSK and not renal colic. Given history of nephrolithiasis, flank pain, and UTI can not exclude infected stone.   -CT Stone Protocol  -VS Q4hrs  -IVF  -Ceftriaxone Q24hrs  -Follow UC   -Strain urine  -Flomax  -Zofran PRN nausea  -Toradol PRN pain  -Dilaudid for break through pain    FEN: As tolerated  Lines: PIV  Prophylaxis: Early ambulation          Consults:   -Consider Urology consult,   -If CT + for stone will consult urology          Discharge Planning:   Pending CT results, ability to tolerate oral intake, and symptom management         Interval History:   Resting in bed. Flank pain improved with IV pain regimen, but still ongoing. Vomited x 1 this am.     ROS:   Constitutional: No fevers/chills. Tolerating diet.   Cardiovascular: No chest pain or palpitations.   Respiratory: No cough or SOB.   GI: No abdominal pain. No N/V.      Musculoskeletal: Denies pain.           Physical Exam:   /78 (BP Location: Right arm)   Pulse 88   Temp 97.9  F (36.6  " C) (Oral)   Resp 16   Ht 1.626 m (5' 4\")   Wt 112.5 kg (248 lb)   SpO2 99%   BMI 42.57 kg/m       GENERAL: Alert and oriented x 3. NAD.   HEENT: Anicteric sclera. Mucous membranes moist.   CV: RRR. S1, S2. No murmurs appreciated.   RESPIRATORY: Effort normal. Lungs CTAB with no wheezing, rales, rhonchi.   GI: Abdomen soft and non distended with normoactive bowel sounds present in all quadrants. No tenderness, rebound, guarding. + mild bilateral CVA tenderness   NEUROLOGICAL: No focal deficits. Moves all extremities.    EXTREMITIES: No peripheral edema. Intact bilateral pedal pulses.   SKIN: No jaundice. No rashes.     Medication list reviewed.   Today's labs and imaging were reviewed.     JESICA Lima, CNP  Emergency Department Observation Unit      "

## 2020-07-17 NOTE — ED TRIAGE NOTES
Patient reports having kidney stones in November of 2019 and has been having bilateral flank pain on and off since. Patient reports literal flank pain at this time. Denies difficulty with urination at this time.

## 2020-07-17 NOTE — H&P
[facility], [place]    History and Physical - ED Observation       Date of Admission:  7/17/2020    Assessment & Plan   Ximena Diaz is a 19 year old female with a history of kidney stones who presents to the Emergency Dept with complaint of left flank pain.    ##Pyelonephritis  19 yr old female with leukocytosis, positive urinary analysis. Ultrasound reveals no hydronephrosis and bilateral ureteral jets are seen. Urine culture is pending. Patient has tolerated a dose of Toradol 15mg IV x1 for flank pain and she has been given ceftriaxone IV x1. She is afebrile. She is not tolerating pain well but is declining narcotics. Pt reports flank pain but denies other urinary complaints. Per chart review, she had 0.2cm left kidney stone at [facility] on CT in January 2020. Plan for admission to ED Observation for monitoring and management of pyelonephritis.  -[place] to ED Obs  -VS Q4hrs  -IVF  -Ceftriaxone Q24hrs  -Urine culture pending  -Strain urine  -Flomax  -Zofran PRN nausea  -Toradol PRN pain  -Dilaudid for break through pain     Diet: clear liquids  DVT Prophylaxis: Low Risk/Ambulatory with no VTE prophylaxis indicated  Downey Catheter: not present  Code Status: full         Disposition Plan   Expected discharge: Tomorrow, recommended to prior living arrangement once adequate pain management/ tolerating PO medications, antibiotic plan established and safe disposition plan/ TCU bed available.  Entered: Maddy Garcia CNP 07/17/2020, 2:37 AM       Maddy Garcia CNP  [facility]  ED Observation, 6D  [id number]  ______________________________________________________________________    Chief Complaint   Left flank pain    History is obtained from the patient    History of Present Illness   Ximena Diaz is a 19 year old female with a past medical history of kidney stones who presents to the ED for evaluation of flank pain. She reports the pain is 10/10  "in the left flank. She typically endures this sort of pain at home with Advil but this time the pain is too severe. She denies any urinary frequency, urgency, dysuria, or hematuria. She last had kidney stone in January 2020 and passed stone with flomax and hydrocodone, at home.     CT Abdomen Pelvis Impressions (1/24/2020):  1. 0.2 cm distal left ureteral calculus at the ureterovesicular junction,  resulting in mild obstruction.  2. Tiny nonobstructing right renal calculus.    In the ED, /92, , temp 97.8, RR 16, SPO2 99% on RA. Labs show WBC 15.8, Hgb 13.1, hematocrit 40.5. Na 140, K+ 3.9, Cr 0.84, BUN 12. Hcg serum negative. Lactic acid 1.1, Lipase 58. UA with positive nitrites, small blood, Large LE, 3 WBCs, \"Many bacteria\" urine. Urine culture pending. US shows Normal sonographic appearance of the kidneys. No Hydronephrosis. Toradol 15mg IV administered. Tylenol 1000mg once. Plan for admission to ED Obs for pain control and management of pyelonephritis.     On arrival in ED Obs, the patient was stable.    Review of Systems    Constitutional: Negative for chills and fever.   HENT: Negative for congestion.    Eyes: Negative for redness.   Respiratory: Negative for cough and shortness of breath.    Cardiovascular: Negative for chest pain.   Gastrointestinal: Negative for abdominal pain, nausea and vomiting.   Genitourinary: Negative for difficulty urinating, pain with urinating. Positive for flank pain.   Musculoskeletal: Negative for arthralgias, neck pain and neck stiffness.   Skin: Negative for color change.   Neurological: Negative for weakness, light-headedness and numbness.   Hematological: Negative for adenopathy. Does not bruise/bleed easily.   Psychiatric/Behavioral: Negative for confusion.   All other systems reviewed and are negative.     A complete review of systems was performed with pertinent positives and negatives noted in the HPI, and all other systems negative.    Past Medical History  "   I have reviewed this patient's medical history and updated it with pertinent information if needed.   History reviewed. No pertinent past medical history.    Past Surgical History   I have reviewed this patient's surgical history and updated it with pertinent information if needed.  Past Surgical History:   Procedure Laterality Date     ESOPHAGOSCOPY, GASTROSCOPY, DUODENOSCOPY (EGD), COMBINED N/A 2016    Procedure: COMBINED ESOPHAGOSCOPY, GASTROSCOPY, DUODENOSCOPY (EGD);  Surgeon: Arnav Schmid MD;  Location: [facility] OR     ESOPHAGOSCOPY, GASTROSCOPY, DUODENOSCOPY (EGD), COMBINED N/A 2016    Procedure: COMBINED ESOPHAGOSCOPY, GASTROSCOPY, DUODENOSCOPY (EGD), BIOPSY SINGLE OR MULTIPLE;  Surgeon: Arnav Schmid MD;  Location: [facility] OR     NO HISTORY OF SURGERY       TONSILLECTOMY, ADENOIDECTOMY, COMBINED Bilateral 3/25/2019    Procedure: BILATERAL  TONSILLECTOMY;  Surgeon: Giovanni Travis MD;  Location: [facility] OR       Social History   I have reviewed this patient's social history and updated it with pertinent information if needed.      Family History         Prior to Admission Medications   Prior to Admission Medications   Prescriptions Last Dose Informant Patient Reported? Taking?   HYDROcodone-acetaminophen (NORCO) 5-325 MG tablet   Yes No   Sig: Take 1 tablet by mouth as needed   etonogestrel (IMPLANON/NEXPLANON) 68 MG IMPL   No No   Si each (68 mg) by Subdermal route continuous   fexofenadine (ALLEGRA) 180 MG tablet   Yes No   Sig: Take 1 tablet (180 mg) by mouth daily   nitroFURantoin macrocrystal-monohydrate (MACROBID) 100 MG capsule   No No   Sig: Take 1 capsule (100 mg) by mouth 2 times daily for 5 days   ondansetron (ZOFRAN-ODT) 4 MG ODT tab   No No   Sig: Take 1 tablet (4 mg) by mouth every 8 hours as needed for nausea or vomiting   Patient not taking: Reported on 2020   oxyCODONE (ROXICODONE) 5 MG/5ML solution   No No   Sig: Take 5 mLs (5 mg) by mouth every 4 hours as needed for moderate to  severe pain   Patient not taking: Reported on 2/12/2020   tamsulosin (FLOMAX) 0.4 MG capsule   No No   Sig: Take 1 capsule (0.4 mg) by mouth daily      Facility-Administered Medications: None     Allergies   Allergies   Allergen Reactions     Nuts Anaphylaxis     No Clinical Screening - See Comments Rash     Nickel        Physical Exam   Vital Signs: Temp: 97.8  F (36.6  C) Temp src: Oral BP: 111/68 Pulse: 98   Resp: 16 SpO2: 98 %      Weight: 0 lbs 0 oz    Constitutional: awake, alert, cooperative, no apparent distress, and appears stated age  Eyes: Lids and lashes normal, pupils equal, round and reactive to light, extra ocular muscles intact, sclera clear, conjunctiva normal  ENT: Normocephalic, atraumatic, external ears without lesions, oral pharynx with moist mucous membranes, gums normal and good dentition.  Respiratory: Clear to auscultation bilaterally  Cardiovascular: Normal apical impulse, regular rate and rhythm, normal S1 and S2, no S3 or S4, and no murmur noted  Abdomen: Normal bowel sounds, soft, non-distended, non-tender, no masses palpated, no hepatosplenomegally  Back: There is no CVA tenderness on the left.  Skin: Normal skin color, texture, turgor  Musculoskeletal: There is no redness, warmth, or swelling of the joints.  Full range of motion noted.  Motor strength is 5 out of 5 all extremities bilaterally.  Tone is normal.  Neurologic: Awake, alert, oriented to name, place and time.  Cranial nerves II-XII are grossly intact.  Motor is 5 out of 5 bilaterally.  Gait is normal.    Data     Recent Labs   Lab 07/17/20  0056   WBC 15.8*   HGB 13.1   MCV 86         POTASSIUM 3.9   CHLORIDE 110*   CO2 24   BUN 12   CR 0.84   ANIONGAP 5   NIKI 9.0   GLC 90   ALBUMIN 3.6   PROTTOTAL 7.5   BILITOTAL 0.2   ALKPHOS 113   ALT 36   AST 16   LIPASE 58*     Recent Results (from the past 24 hour(s))   US Renal Complete    Narrative    EXAMINATION: Renal ultrasound, 7/17/2020 1:53 AM     COMPARISON:  12/30/2015    HISTORY: Flank pain, leukocytosis    TECHNIQUE: The kidneys and bladder were scanned in the standard  fashion with specialized ultrasound transducer(s) using both gray  scale and limited color/spectral Doppler techniques.    FINDINGS:    Right kidney: Measures 11.1 cm in length. Parenchyma is of normal  thickness and echogenicity. No focal mass. No hydronephrosis.    Left kidney: Measures 12.0 cm in length. Parenchyma is of normal  thickness and echogenicity. No focal mass. No hydronephrosis.     Bladder: Incompletely distended. Bilateral ureteral jets are seen.      Impression    IMPRESSION: Normal sonographic appearance of the kidneys. No  hydronephrosis.

## 2020-07-18 LAB
BACTERIA SPEC CULT: NORMAL
Lab: NORMAL
SPECIMEN SOURCE: NORMAL

## 2020-07-18 ASSESSMENT — ENCOUNTER SYMPTOMS
BACK PAIN: 1
DYSURIA: 0
DIAPHORESIS: 0
CONSTIPATION: 0
SHORTNESS OF BREATH: 0
FATIGUE: 1
FREQUENCY: 0
NECK STIFFNESS: 0
WEAKNESS: 0
NECK PAIN: 0
COUGH: 0
PALPITATIONS: 0
ABDOMINAL PAIN: 1
HEMATURIA: 0
NAUSEA: 1
DIARRHEA: 0
CHILLS: 0
COLOR CHANGE: 0
FEVER: 0
FLANK PAIN: 1

## 2020-07-20 ENCOUNTER — TELEPHONE (OUTPATIENT)
Dept: FAMILY MEDICINE | Facility: OTHER | Age: 20
End: 2020-07-20

## 2020-07-20 NOTE — TELEPHONE ENCOUNTER
RN to call for hospital follow up:    Reason for follow up: Ximena Diaz appeared on our list for being seen in an Emergency Room or a recent Hospital discharge.    Admitting date: 7/17/20  Discharge date: 7/17/20  Location: [facility]  Reason for visit:   Chief Complaint   Patient presents with     Hospital F/U     Pylonephritis       Galilea Delgado RN, BSN

## 2020-07-20 NOTE — TELEPHONE ENCOUNTER
ED / Discharge Outreach Protocol    Patient Contact    Attempt # 1    Was call answered?  No.  Left message on voicemail with information to call me back.    Rebeka Agosto, MSN, RN

## 2020-07-20 NOTE — TELEPHONE ENCOUNTER
"Hospital/TCU/ED for chronic condition Discharge Protocol    \"Hi, my name is Rebeak Agosto, RN, a registered nurse, and I am calling from [facility].  I am calling to follow up and see how things are going for you after your recent emergency visit/hospital/TCU stay.\"    Tell me how you are doing now that you are home?\" doing better      Discharge Instructions    \"Let's review your discharge instructions.  What is/are the follow-up recommendations?  Pt. Response: none    \"Has an appointment with your primary care provider been scheduled?\"   No (schedule appointment), she will call today to schedule    \"When you see the provider, I would recommend that you bring your medications with you.\"    Medications    \"Tell me what changed about your medicines when you discharged?\"    Changes to chronic meds?    0-1    \"What questions do you have about your medications?\"    None     Post Discharge Medication Reconciliation Status: discharge medications reconciled, continue medications without change.      Call Summary    \"What questions or concerns do you have about your recent visit and your follow-up care?\"     none    \"If you have questions or things don't continue to improve, we encourage you contact us through the main clinic number (give number).  Even if the clinic is not open, triage nurses are available 24/7 to help you.     We would like you to know that our clinic has extended hours (provide information).  We also have urgent care (provide details on closest location and hours/contact info)\"      \"Thank you for your time and take care!\"    Rebeka Agosto, MSN, RN               "

## 2020-07-22 ENCOUNTER — TELEPHONE (OUTPATIENT)
Dept: OBGYN | Facility: CLINIC | Age: 20
End: 2020-07-22

## 2020-07-22 NOTE — TELEPHONE ENCOUNTER
Reason for Call:  Other appointment    Detailed comments: pt needs birthcontrol in her arm reinserted as it has moved towards her elbow and is causing pain    Phone Number Patient can be reached at: Cell number on file:    Telephone Information:   Mobile [phone]       Best Time: any    Can we leave a detailed message on this number? YES    Call taken on 7/22/2020 at 1:12 PM by Bridget Lizama

## 2020-07-28 ENCOUNTER — OFFICE VISIT (OUTPATIENT)
Dept: FAMILY MEDICINE | Facility: CLINIC | Age: 20
End: 2020-07-28
Payer: COMMERCIAL

## 2020-07-28 VITALS
TEMPERATURE: 98.5 F | DIASTOLIC BLOOD PRESSURE: 60 MMHG | WEIGHT: 244 LBS | SYSTOLIC BLOOD PRESSURE: 110 MMHG | HEART RATE: 87 BPM | RESPIRATION RATE: 20 BRPM | OXYGEN SATURATION: 100 % | HEIGHT: 64 IN | BODY MASS INDEX: 41.66 KG/M2

## 2020-07-28 DIAGNOSIS — R82.90 NONSPECIFIC FINDING ON EXAMINATION OF URINE: ICD-10-CM

## 2020-07-28 DIAGNOSIS — Z87.448 HISTORY OF PYELONEPHRITIS: Primary | ICD-10-CM

## 2020-07-28 DIAGNOSIS — N20.0 NEPHROLITHIASIS: ICD-10-CM

## 2020-07-28 DIAGNOSIS — D72.829 LEUKOCYTOSIS, UNSPECIFIED TYPE: ICD-10-CM

## 2020-07-28 DIAGNOSIS — R59.0 ABDOMINAL LYMPHADENOPATHY: ICD-10-CM

## 2020-07-28 LAB
ALBUMIN UR-MCNC: NEGATIVE MG/DL
APPEARANCE UR: ABNORMAL
BACTERIA #/AREA URNS HPF: ABNORMAL /HPF
BASOPHILS # BLD AUTO: 0 10E9/L (ref 0–0.2)
BASOPHILS NFR BLD AUTO: 0.4 %
BILIRUB UR QL STRIP: NEGATIVE
COLOR UR AUTO: YELLOW
DIFFERENTIAL METHOD BLD: NORMAL
EOSINOPHIL # BLD AUTO: 0.3 10E9/L (ref 0–0.7)
EOSINOPHIL NFR BLD AUTO: 2.3 %
ERYTHROCYTE [DISTWIDTH] IN BLOOD BY AUTOMATED COUNT: 13.4 % (ref 10–15)
GLUCOSE UR STRIP-MCNC: NEGATIVE MG/DL
HCT VFR BLD AUTO: 43.4 % (ref 35–47)
HGB BLD-MCNC: 13.8 G/DL (ref 11.7–15.7)
HGB UR QL STRIP: ABNORMAL
KETONES UR STRIP-MCNC: NEGATIVE MG/DL
LEUKOCYTE ESTERASE UR QL STRIP: ABNORMAL
LYMPHOCYTES # BLD AUTO: 2.8 10E9/L (ref 0.8–5.3)
LYMPHOCYTES NFR BLD AUTO: 25.9 %
MCH RBC QN AUTO: 26.6 PG (ref 26.5–33)
MCHC RBC AUTO-ENTMCNC: 31.8 G/DL (ref 31.5–36.5)
MCV RBC AUTO: 84 FL (ref 78–100)
MONOCYTES # BLD AUTO: 0.7 10E9/L (ref 0–1.3)
MONOCYTES NFR BLD AUTO: 6.9 %
NEUTROPHILS # BLD AUTO: 6.9 10E9/L (ref 1.6–8.3)
NEUTROPHILS NFR BLD AUTO: 64.5 %
NITRATE UR QL: NEGATIVE
NON-SQ EPI CELLS #/AREA URNS LPF: ABNORMAL /LPF
PH UR STRIP: 6 PH (ref 5–7)
PLATELET # BLD AUTO: 336 10E9/L (ref 150–450)
RBC # BLD AUTO: 5.18 10E12/L (ref 3.8–5.2)
RBC #/AREA URNS AUTO: ABNORMAL /HPF
SOURCE: ABNORMAL
SP GR UR STRIP: 1.02 (ref 1–1.03)
UROBILINOGEN UR STRIP-ACNC: 0.2 EU/DL (ref 0.2–1)
WBC # BLD AUTO: 10.7 10E9/L (ref 4–11)
WBC #/AREA URNS AUTO: ABNORMAL /HPF

## 2020-07-28 PROCEDURE — 36415 COLL VENOUS BLD VENIPUNCTURE: CPT | Performed by: NURSE PRACTITIONER

## 2020-07-28 PROCEDURE — 81001 URINALYSIS AUTO W/SCOPE: CPT | Performed by: NURSE PRACTITIONER

## 2020-07-28 PROCEDURE — 87086 URINE CULTURE/COLONY COUNT: CPT | Performed by: NURSE PRACTITIONER

## 2020-07-28 PROCEDURE — 85025 COMPLETE CBC W/AUTO DIFF WBC: CPT | Performed by: NURSE PRACTITIONER

## 2020-07-28 PROCEDURE — 99214 OFFICE O/P EST MOD 30 MIN: CPT | Performed by: NURSE PRACTITIONER

## 2020-07-28 ASSESSMENT — MIFFLIN-ST. JEOR: SCORE: 1856.78

## 2020-07-28 NOTE — PATIENT INSTRUCTIONS
Will culture urine sample from today and get back to you.     Will check blood counts to make sure white blood cell count is going back to normal.     Please schedule follow-up CT of abdomen that was recommended to make sure lymph nodes are going back to normal.   Schedule this for next week.     Please call to make an appointment with urology.     If you start to feel sick- temp greater than 101, chills, sweats, nausea/vomiting, severe pain, then you need to be seen in the ER.

## 2020-07-28 NOTE — PROGRESS NOTES
Subjective     Ximena Diaz is a 19 year old female who presents to clinic today for the following health issues:    HPI         Hospital Follow-up Visit:    Hospital/Nursing Home/IP Rehab Facility: [facility]  Date of Admission: 7/16/2020  Date of Discharge: 7/17/2020  Reason(s) for Admission: Kidney stones      Was your hospitalization related to COVID-19? No   Problems taking medications regularly:  None  Medication changes since discharge: None  Problems adhering to non-medication therapy:  None    Summary of hospitalization:  [facility] discharge summary reviewed:    From H&P:  Ximena Diaz is a 19 year old female with a past medical history of kidney stones who presents to the ED for evaluation of flank pain. She reports the pain is 10/10 in the left flank. She typically endures this sort of pain at home with Advil but this time the pain is too severe. She denies any urinary frequency, urgency, dysuria, or hematuria. She last had kidney stone in January 2020 and passed stone with flomax and hydrocodone, at home  ##Pyelonephritis  19 yr old female with leukocytosis, positive urinary analysis. Ultrasound reveals no hydronephrosis and bilateral ureteral jets are seen. Urine culture is pending. Patient has tolerated a dose of Toradol 15mg IV x1 for flank pain and she has been given ceftriaxone IV x1. She is afebrile. She is not tolerating pain well but is declining narcotics. Pt reports flank pain but denies other urinary complaints. Per chart review, she had 0.2cm left kidney stone at [facility] on CT in January 2020. Plan for admission to ED Observation for monitoring and management of pyelonephritis.  -Machias to ED Obs  -VS Q4hrs  -IVF  -Ceftriaxone Q24hrs  -Urine culture pending  -Strain urine  -Flomax  -Zofran PRN nausea  -Toradol PRN pain  -Dilaudid for break through pain    From discharge summary:   Admitted to ed observation unit for pyelonephritis.  Had elevated white count  at admission.  Was having left flank pain and nausea.  Placed on IV rocephin.  UC sent.  Ct stone protocol showed kidney stone but no ureteral stone, no hydronephrosis but showed inflammation/ vs infectious process.  Suspected due to uti but will require pmd follow up.   Key management decisions made by me: Feeling better and requested to be discharged.  Longer stay offered but she declined.  UC pending.  Switched to oral cefdinir at discharge.    Diagnostic Tests/Treatments reviewed.  Follow up needed: Per discharge summary, need follow-up on urine culture, referral to urology, follow-up CT for abdominal lymphadenopathy    Other Healthcare Providers Involved in Patient s Care:         None  Update since discharge: improved.   Patient reports continued pain bilateral flank pain since being home.  She states pain similar to time of hospital admission.  Pain worse at bedtime.  Not requiring prescription oxycodone. No longer having any nausea or vomiting.  Eating and drinking okay.   No fevers.  Denies urinary symptoms.  Has not scheduled with urologist yet.  Has seen Dr. Stubbs in the past.  Patient would like 2nd opinion.  She was told that her previous kidney stones would pass on their own, hasn't had any interventions.  She is not sure she ever passed them.  She seems to have chronic bilateral low back pain that she attributes to kidney stones.  Per review of urology notes, this was thought to be likely musculoskeletal in nature.  Patient was very upset about that.        Post Discharge Medication Reconciliation: discharge medications reconciled, continue medications without change.  Plan of care communicated with patient                  Patient Active Problem List   Diagnosis     Esophageal reflux     Nut allergy     Abdominal pain, left upper quadrant     Non-intractable vomiting with nausea, vomiting of unspecified type     Nexplanon in place     Acute recurrent streptococcal tonsillitis     Flank pain      "Pyelonephritis     Past Surgical History:   Procedure Laterality Date     ESOPHAGOSCOPY, GASTROSCOPY, DUODENOSCOPY (EGD), COMBINED N/A 2/17/2016    Procedure: COMBINED ESOPHAGOSCOPY, GASTROSCOPY, DUODENOSCOPY (EGD);  Surgeon: Arnav Schmid MD;  Location: [facility] OR     ESOPHAGOSCOPY, GASTROSCOPY, DUODENOSCOPY (EGD), COMBINED N/A 2/17/2016    Procedure: COMBINED ESOPHAGOSCOPY, GASTROSCOPY, DUODENOSCOPY (EGD), BIOPSY SINGLE OR MULTIPLE;  Surgeon: Arnav Schmid MD;  Location: [facility] OR     NO HISTORY OF SURGERY       TONSILLECTOMY, ADENOIDECTOMY, COMBINED Bilateral 3/25/2019    Procedure: BILATERAL  TONSILLECTOMY;  Surgeon: Giovanni Travis MD;  Location: [facility] OR       Social History     Tobacco Use     Smoking status: Never Smoker     Smokeless tobacco: Never Used   Substance Use Topics     Alcohol use: Yes     Comment: on weekends     History reviewed. No pertinent family history.      Current Outpatient Medications   Medication Sig Dispense Refill     etonogestrel (IMPLANON/NEXPLANON) 68 MG IMPL 1 each (68 mg) by Subdermal route continuous  0     acetaminophen (TYLENOL) 325 MG tablet Take 2 tablets (650 mg) by mouth every 4 hours as needed for mild pain       fexofenadine (ALLEGRA) 180 MG tablet Take 1 tablet (180 mg) by mouth daily 30 tablet 1     Allergies   Allergen Reactions     Nuts Anaphylaxis     No Clinical Screening - See Comments Rash     Nickel        Reviewed and updated as needed this visit by Provider  Tobacco  Allergies  Meds  Problems  Med Hx  Surg Hx  Fam Hx         Review of Systems   Constitutional, HEENT, cardiovascular, pulmonary, GI, , musculoskeletal, neuro, skin, endocrine and psych systems are negative, except as otherwise noted.      Objective    /60   Pulse 87   Temp 98.5  F (36.9  C) (Tympanic)   Resp 20   Ht 1.626 m (5' 4\")   Wt 110.7 kg (244 lb)   SpO2 100%   BMI 41.88 kg/m    Body mass index is 41.88 kg/m .  Physical Exam   GENERAL: healthy, alert and no distress  EYES: " Eyes grossly normal to inspection, PERRL and conjunctivae and sclerae normal  HENT: ear canals and TM's normal, nose and mouth without ulcers or lesions  NECK: no adenopathy, no asymmetry, masses, or scars and thyroid normal to palpation  RESP: lungs clear to auscultation - no rales, rhonchi or wheezes  CV: regular rate and rhythm, normal S1 S2, no S3 or S4, no murmur, click or rub, no peripheral edema and peripheral pulses strong  ABDOMEN: soft, patient reports TTP throughout, no rebound or guarding, no hepatosplenomegaly, no masses and bowel sounds normal  MS: no gross musculoskeletal defects noted, no edema.  Reports CVA tenderness bilaterally.     SKIN: no suspicious lesions or rashes  NEURO: Normal strength and tone, mentation intact and speech normal  PSYCH: mentation appears normal, affect normal/bright    Diagnostic Test Results:  Labs reviewed in Epic    UA RESULTS:    Culture from hospital admission showed >100k mixed urogenital dhruv, susceptibility testing not done.       Recent Labs   Lab Test 07/28/20  1200   COLOR Yellow   APPEARANCE Slightly Cloudy   URINEGLC Negative   URINEBILI Negative   URINEKETONE Negative   SG 1.025   UBLD Trace*   URINEPH 6.0   PROTEIN Negative   UROBILINOGEN 0.2   NITRITE Negative   LEUKEST Trace*   RBCU 2-5*   WBCU 5-10*     Culture from today pending    CBC RESULTS:   Recent Labs   Lab Test 07/28/20  1234   WBC 10.7   RBC 5.18   HGB 13.8   HCT 43.4   MCV 84   MCH 26.6   MCHC 31.8   RDW 13.4                  Assessment & Plan       ICD-10-CM    1. Nephrolithiasis  N20.0 UROLOGY ADULT REFERRAL   2. History of pyelonephritis  Z87.448    3. Leukocytosis, unspecified type  D72.829 CBC with platelets and differential   4. Abdominal lymphadenopathy  R59.0 CT Abdomen Pelvis w/o Contrast   5. Nonspecific finding on examination of urine  R82.90 *UA reflex to Microscopic     Urine Microscopic     Urine Culture Aerobic Bacterial     Patient afebrile, stable vitals.  Appears  non-toxic.  Nausea resolved.  Continues to have bilateral flank pain, not requiring oxycodone that was prescribed.   Leukocytosis has resolved.   Urine improved, culture from today pending.  No new medications started.   Referral to urology for hx of renal calculi.   Abdominal lymphadenopathy thought to be most likely be from UTI, but it was recommended by hospitalist that this be followed-up on- will repeat CT to ensure this resolved.           See Patient Instructions  Patient Instructions   Will culture urine sample from today and get back to you.     Will check blood counts to make sure white blood cell count is going back to normal.     Please schedule follow-up CT of abdomen that was recommended to make sure lymph nodes are going back to normal.   Schedule this for next week.     Please call to make an appointment with urology.     If you start to feel sick- temp greater than 101, chills, sweats, nausea/vomiting, severe pain, then you need to be seen in the ER.           Return in about 2 weeks (around 8/11/2020) for If failure to improve.    Yvette Serrato, CNP  [facility]

## 2020-07-29 LAB
BACTERIA SPEC CULT: NORMAL
BACTERIA SPEC CULT: NORMAL
Lab: NORMAL
SPECIMEN SOURCE: NORMAL

## 2020-08-04 ENCOUNTER — ANCILLARY PROCEDURE (OUTPATIENT)
Dept: CT IMAGING | Facility: CLINIC | Age: 20
End: 2020-08-04
Attending: NURSE PRACTITIONER
Payer: COMMERCIAL

## 2020-08-04 DIAGNOSIS — R59.0 ABDOMINAL LYMPHADENOPATHY: ICD-10-CM

## 2020-08-06 ENCOUNTER — NURSE TRIAGE (OUTPATIENT)
Dept: NURSING | Facility: CLINIC | Age: 20
End: 2020-08-06

## 2020-08-07 ENCOUNTER — HOSPITAL ENCOUNTER (EMERGENCY)
Facility: CLINIC | Age: 20
Discharge: HOME | End: 2020-08-07
Attending: EMERGENCY MEDICINE | Admitting: EMERGENCY MEDICINE
Payer: COMMERCIAL

## 2020-08-07 VITALS
SYSTOLIC BLOOD PRESSURE: 132 MMHG | TEMPERATURE: 98.1 F | HEART RATE: 94 BPM | HEIGHT: 64 IN | BODY MASS INDEX: 41.88 KG/M2 | DIASTOLIC BLOOD PRESSURE: 78 MMHG | RESPIRATION RATE: 16 BRPM | OXYGEN SATURATION: 98 %

## 2020-08-07 DIAGNOSIS — N23 RENAL COLIC: ICD-10-CM

## 2020-08-07 LAB
ALBUMIN UR-MCNC: NEGATIVE MG/DL
APPEARANCE UR: CLEAR
BILIRUB UR QL STRIP: NEGATIVE
COLOR UR AUTO: YELLOW
GLUCOSE UR STRIP-MCNC: NEGATIVE MG/DL
HCG UR QL: NEGATIVE
HGB UR QL STRIP: ABNORMAL
INTERNAL QC OK POCT: YES
KETONES UR STRIP-MCNC: NEGATIVE MG/DL
LEUKOCYTE ESTERASE UR QL STRIP: ABNORMAL
MUCOUS THREADS #/AREA URNS LPF: PRESENT /LPF
NITRATE UR QL: NEGATIVE
PH UR STRIP: 6 PH (ref 5–7)
RBC #/AREA URNS AUTO: 29 /HPF (ref 0–2)
SOURCE: ABNORMAL
SP GR UR STRIP: 1.02 (ref 1–1.03)
SQUAMOUS #/AREA URNS AUTO: 4 /HPF (ref 0–1)
UROBILINOGEN UR STRIP-MCNC: NORMAL MG/DL (ref 0–2)
WBC #/AREA URNS AUTO: 6 /HPF (ref 0–5)

## 2020-08-07 PROCEDURE — 25000132 ZZH RX MED GY IP 250 OP 250 PS 637: Performed by: EMERGENCY MEDICINE

## 2020-08-07 PROCEDURE — 81001 URINALYSIS AUTO W/SCOPE: CPT | Performed by: EMERGENCY MEDICINE

## 2020-08-07 PROCEDURE — 99283 EMERGENCY DEPT VISIT LOW MDM: CPT | Mod: Z6 | Performed by: EMERGENCY MEDICINE

## 2020-08-07 PROCEDURE — 99283 EMERGENCY DEPT VISIT LOW MDM: CPT | Performed by: EMERGENCY MEDICINE

## 2020-08-07 PROCEDURE — 81025 URINE PREGNANCY TEST: CPT | Performed by: EMERGENCY MEDICINE

## 2020-08-07 RX ORDER — IBUPROFEN 800 MG/1
800 TABLET, FILM COATED ORAL ONCE
Status: COMPLETED | OUTPATIENT
Start: 2020-08-07 | End: 2020-08-07

## 2020-08-07 RX ORDER — ACETAMINOPHEN 500 MG
1000 TABLET ORAL ONCE
Status: COMPLETED | OUTPATIENT
Start: 2020-08-07 | End: 2020-08-07

## 2020-08-07 RX ADMIN — IBUPROFEN 800 MG: 800 TABLET, FILM COATED ORAL at 01:28

## 2020-08-07 RX ADMIN — ACETAMINOPHEN 1000 MG: 500 TABLET, FILM COATED ORAL at 01:28

## 2020-08-07 ASSESSMENT — ENCOUNTER SYMPTOMS
COUGH: 0
FEVER: 0
CHILLS: 0
SHORTNESS OF BREATH: 0

## 2020-08-07 NOTE — ED PROVIDER NOTES
ED Provider Note  [facility]      History     Chief Complaint   Patient presents with     Flank Pain     Secondary to kidney stone, diagnosed on Tuesday via CT. Past history of kidney stones.     HPI  Ximena Diaz is a 19 year old female who reports a history of kidney stones, had one a few weeks ago, had a repeat scan a few days ago which showed no change.  Nonobstructing 2 mm stone on the right.  Patient denies fever chills abdominal pain, just has bilateral flank pain.  Also denies hematuria dysuria or frequency.  No fever or chills.  Patient is here because she states Motrin is not improving her pain, when I asked, to Motrin she is taking me the patient tells me she takes 200 mg every 8 hours.    Past Medical History  History reviewed. No pertinent past medical history.  Past Surgical History:   Procedure Laterality Date     ESOPHAGOSCOPY, GASTROSCOPY, DUODENOSCOPY (EGD), COMBINED N/A 2/17/2016    Procedure: COMBINED ESOPHAGOSCOPY, GASTROSCOPY, DUODENOSCOPY (EGD);  Surgeon: Arnav Schmid MD;  Location: [facility] OR     ESOPHAGOSCOPY, GASTROSCOPY, DUODENOSCOPY (EGD), COMBINED N/A 2/17/2016    Procedure: COMBINED ESOPHAGOSCOPY, GASTROSCOPY, DUODENOSCOPY (EGD), BIOPSY SINGLE OR MULTIPLE;  Surgeon: Arnav Schmid MD;  Location: [facility] OR     NO HISTORY OF SURGERY       TONSILLECTOMY, ADENOIDECTOMY, COMBINED Bilateral 3/25/2019    Procedure: BILATERAL  TONSILLECTOMY;  Surgeon: Giovanni Travis MD;  Location: [facility] OR     acetaminophen (TYLENOL) 325 MG tablet  etonogestrel (IMPLANON/NEXPLANON) 68 MG IMPL  fexofenadine (ALLEGRA) 180 MG tablet      Allergies   Allergen Reactions     Nuts Anaphylaxis     No Clinical Screening - See Comments Rash     Nickel      Past medical history, past surgical history, medications, and allergies were reviewed with the patient. Additional pertinent items: None    Family History  No family history on file.  Family history was reviewed with the patient. Additional  "pertinent items: None    Social History  Social History     Tobacco Use     Smoking status: Never Smoker     Smokeless tobacco: Never Used   Substance Use Topics     Alcohol use: Yes     Comment: on weekends     Drug use: No      Social history was reviewed with the patient. Additional pertinent items: None    Review of Systems   Constitutional: Negative for chills and fever.   Respiratory: Negative for cough and shortness of breath.    All other systems reviewed and are negative.    A complete review of systems was performed with pertinent positives and negatives noted in the HPI, and all other systems negative.    Physical Exam   BP: (!) 140/89  Pulse: 116  Temp: 98.4  F (36.9  C)  Resp: 16  Height: 162.6 cm (5' 4\")  SpO2: 98 %  Physical Exam  Constitutional:       General: She is not in acute distress.     Appearance: She is not diaphoretic.   HENT:      Head: Atraumatic.      Mouth/Throat:      Pharynx: No oropharyngeal exudate.   Eyes:      General: No scleral icterus.     Pupils: Pupils are equal, round, and reactive to light.   Cardiovascular:      Rate and Rhythm: Normal rate and regular rhythm.      Heart sounds: Normal heart sounds.   Pulmonary:      Effort: No respiratory distress.      Breath sounds: Normal breath sounds.   Abdominal:      General: Bowel sounds are normal.      Palpations: Abdomen is soft.      Tenderness: There is no abdominal tenderness. There is right CVA tenderness. There is no left CVA tenderness.   Musculoskeletal:         General: No tenderness.   Skin:     General: Skin is warm.      Findings: No rash.         ED Course      Procedures                         No results found for any visits on 08/07/20.  Medications   ibuprofen (ADVIL/MOTRIN) tablet 800 mg (800 mg Oral Given 8/7/20 0128)   acetaminophen (TYLENOL) tablet 1,000 mg (1,000 mg Oral Given 8/7/20 0128)        Assessments & Plan (with Medical Decision Making)     19-year-old female past medical surgical history as above " here with probable continued pain from stone.  Given small size of stone, I do not suspect patient has any hydronephrosis.  Will get UA to ensure patient is not developed an infected stone, however I have low suspicion for that as the patient is not tachycardic, on my exam was 90, and is afebrile and reports no fever at home.    Additionally, I informed patient that she is taking a subtherapeutic dose of Motrin, I encouraged her to take 800 mg and a gram of Tylenol every 8 hours as this is quite simple to follow-up and should provide adequate analgesia.  If patient's urine is without infection I will discharge her home.    I have reviewed the nursing notes. I have reviewed the findings, diagnosis, plan and need for follow up with the patient.    New Prescriptions    No medications on file       Final diagnoses:   Renal colic       --  Prudencio Armas MD   Emergency Medicine   [facility], [place], EMERGENCY DEPARTMENT  8/7/2020     Prudencio Armas MD  08/07/20 0135

## 2020-08-07 NOTE — TELEPHONE ENCOUNTER
"Ximena reports that she has back pain on both sides, with shooting pain down both legs and into her spine. Reports that pain makes her \"stiff and unable to move, and hard to breathe.\" Pain rated 6/10.    Has history of kidney stones.    Took Advil but did not help.     Per protocol, advised ED. Care advice reviewed. Patient verbalizes understanding. Advised to call back with further questions/concerns.     Catie Bush RN/[facility] Nurse Advisor        Additional Information    Negative: Passed out (i.e., lost consciousness, collapsed and was not responding)    Negative: Shock suspected (e.g., cold/pale/clammy skin, too weak to stand, low BP, rapid pulse)    Negative: Sounds like a life-threatening emergency to the triager    Negative: [1] SEVERE back pain (e.g., excruciating) AND [2] sudden onset AND [3] age > 60    Negative: [1] Unable to urinate (or only a few drops) > 4 hours AND [2] bladder feels very full (e.g., palpable bladder or strong urge to urinate)    Negative: [1] Loss of bladder or bowel control (urine or bowel incontinence; wetting self, leaking stool) AND [2] new onset    Negative: Numbness in groin or rectal area (i.e., loss of sensation)    Negative: [1] SEVERE abdominal pain AND [2] present > 1 hour    Negative: [1] Abdominal pain AND [2] age > 60    Negative: Weakness of a leg or foot (e.g., unable to bear weight, dragging foot)    Negative: Unable to walk    Negative: Patient sounds very sick or weak to the triager    Negative: [1] SEVERE back pain (e.g., excruciating, unable to do any normal activities) AND [2] not improved 2 hours after pain medicine    [1] Pain radiates into the thigh or further down the leg AND [2] both legs    Protocols used: BACK PAIN-A-AH      "

## 2020-08-07 NOTE — ED AVS SNAPSHOT
[facility] [place], Emergency Department  [address] [place] [id number]  Phone:  [phone]                                    Ximena Diaz   MRN: [id number]    Department:  [facility], Emergency Department   Date of Visit:  8/7/2020           After Visit Summary Signature Page    I have received my discharge instructions, and my questions have been answered. I have discussed any challenges I see with this plan with the nurse or doctor.    ..........................................................................................................................................  Patient/Patient Representative Signature      ..........................................................................................................................................  Patient Representative Print Name and Relationship to Patient    ..................................................               ................................................  Date                                   Time    ..........................................................................................................................................  Reviewed by Signature/Title    ...................................................              ..............................................  Date                                               Time          22EPIC Rev 08/18

## 2020-08-07 NOTE — DISCHARGE INSTRUCTIONS
Take 800mg of motrin and 1000mg of tylenol every 8 hours for pain. Come back if you feel worse, develop fever, or any other new symptoms that concern you. Follow up with your urologist.

## 2020-08-11 ENCOUNTER — PATIENT OUTREACH (OUTPATIENT)
Dept: NURSING | Facility: CLINIC | Age: 20
End: 2020-08-11
Payer: COMMERCIAL

## 2020-08-11 NOTE — PROGRESS NOTES
Clinic Care Coordination Contact  Community Health Worker Initial Outreach       CHW Additional Questions  If ED/Hospital discharge, follow-up appointment scheduled as recommended?: No  Patient agreeable to assistance with scheduling?: No  Patient declined (specify): Patient has an appointment with urologist tomorrow.  Medication changes made following ED/Hospital discharge?: No  MyChart active?: Yes  Patient sent Social Determinants of Health questionnaire?: No    Patient accepts CC: No, Patient declined services.. Patient will be sent Care Coordination introduction letter for future reference.     The Clinic Community Health Worker spoke with the patient today at the request of the PCP to discuss possible Clinic Care Coordination enrollment. The service was described to the patient and immediate needs were discussed. The patient declined enrollment at this time. The PCP is encouraged to refer in the future if the patient's needs change.      Monique ROBIBNS Community Health Worker  Clinic Care Coordination  [facility] : [place] [place] [facility] [place] [place]  Phone: [phone]     CHW to outreach  Referral made off of discharge report.

## 2020-08-11 NOTE — LETTER
[place] CARE COORDINATION  [id number]  [facility] [address]  August 11, 2020    Ximena Diaz  [address]  [place] [address]      Dear Ximena,    I am a clinic community health worker who works with Curtis Holley PA-C at [facility]. I wanted to thank you for spending the time to talk with me.  Below is a description of clinic care coordination and how I can further assist you.      The clinic care coordination team is made up of a registered nurse,  and community health worker who understand the health care system. The goal of clinic care coordination is to help you manage your health and improve access to the health care system in the most efficient manner. The team can assist you in meeting your health care goals by providing education, coordinating services, strengthening the communication among your providers and supporting you with any resource needs.    Please feel free to contact me at [phone] with any questions or concerns. We are focused on providing you with the highest-quality healthcare experience possible and that all starts with you.     Sincerely,     Monique ROBBINS, Community Health Worker  Clinic Care Coordination  [facility] [place] [facility]  Phone: [phone]

## 2020-08-12 ENCOUNTER — VIRTUAL VISIT (OUTPATIENT)
Dept: UROLOGY | Facility: CLINIC | Age: 20
End: 2020-08-12
Attending: NURSE PRACTITIONER
Payer: COMMERCIAL

## 2020-08-12 ENCOUNTER — TELEPHONE (OUTPATIENT)
Dept: UROLOGY | Facility: CLINIC | Age: 20
End: 2020-08-12

## 2020-08-12 DIAGNOSIS — N20.1 URETERAL STONE: Primary | ICD-10-CM

## 2020-08-12 DIAGNOSIS — N20.0 KIDNEY STONE: Primary | ICD-10-CM

## 2020-08-12 PROCEDURE — 99212 OFFICE O/P EST SF 10 MIN: CPT | Mod: TEL | Performed by: UROLOGY

## 2020-08-12 RX ORDER — CEFAZOLIN SODIUM 1 G
1 VIAL (EA) INJECTION SEE ADMIN INSTRUCTIONS
Status: CANCELLED | OUTPATIENT
Start: 2020-08-12

## 2020-08-12 NOTE — PROGRESS NOTES
"Ximena Diaz is a 19 year old female who is being evaluated via a billable telephone visit.      The patient has been notified of following:     \"This telephone visit will be conducted via a call between you and your physician/provider. We have found that certain health care needs can be provided without the need for a physical exam.  This service lets us provide the care you need with a short phone conversation.  If a prescription is necessary we can send it directly to your pharmacy.  If lab work is needed we can place an order for that and you can then stop by our lab to have the test done at a later time.    Telephone visits are billed at different rates depending on your insurance coverage. During this emergency period, for some insurers they may be billed the same as an in-person visit.  Please reach out to your insurance provider with any questions.    If during the course of the call the physician/provider feels a telephone visit is not appropriate, you will not be charged for this service.\"    Patient has given verbal consent for Telephone visit?  Yes    What phone number would you like to be contacted at? [phone]    How would you like to obtain your AVS? Alyssa Reed LPN on 8/12/2020 at 11:25 AM        "

## 2020-08-12 NOTE — TELEPHONE ENCOUNTER
----- Message from Giovanni Velasco MD sent at 8/12/2020 12:14 PM CDT -----    Surgery orders placed for [facility] for ureteroscopy.  Needs a UA/UCx at her pre-op please.    -Thank You  STUART

## 2020-08-12 NOTE — PROGRESS NOTES
Ximena Diaz is a 19 year old female here to follow-up kidney stones.    She had seen Dr. Stubbs 2/2020.    Passed a 2mm left ureteral stone Spring 2020. Around Feb 2020.  Did not catch the stone.  Had typical renal colic at that time.  This was her first stone.    She is having pain now.  ER visit July 17,2020 for flank pain.  Last week went back to the ER for pain.    She gets sudden sharp shooting pain, pain up to spine and down to back.  No particular exacerbating/relieving factors.    Exam deferred over the phone.  Attitude and demeanor is pleasant, alert & oriented and conversant.  No gross thought process defects during casual conversation.     I reviewed imaging results/ images:  8/4/20 CT  7/17/20 CT and renal ultrasound  3/12/20 KUB      Component      Latest Ref Rng & Units 1/28/2020 2/12/2020              WBC      4.0 - 11.0 10e9/L     RBC Count      3.8 - 5.2 10e12/L     Hemoglobin      11.7 - 15.7 g/dL     Hematocrit      35.0 - 47.0 %     MCV      78 - 100 fl     MCH      26.5 - 33.0 pg     MCHC      31.5 - 36.5 g/dL     RDW      10.0 - 15.0 %     Platelet Count      150 - 450 10e9/L     Diff Method           % Neutrophils      %     % Lymphocytes      %     % Monocytes      %     % Eosinophils      %     % Basophils      %     % Immature Granulocytes      %     Nucleated RBCs      0 /100     Absolute Neutrophil      1.6 - 8.3 10e9/L     Absolute Lymphocytes      0.8 - 5.3 10e9/L     Absolute Monocytes      0.0 - 1.3 10e9/L     Absolute Eosinophils      0.0 - 0.7 10e9/L     Absolute Basophils      0.0 - 0.2 10e9/L     Abs Immature Granulocytes      0 - 0.4 10e9/L     Absolute Nucleated RBC           Color Urine       Yellow Yellow   Appearance Urine       Clear Clear   Glucose Urine      NEG:Negative mg/dL Negative Negative   Bilirubin Urine      NEG:Negative Small (A) Small (A)   Ketones Urine      NEG:Negative mg/dL Negative Negative   Specific Gravity Urine      1.003 - 1.035 1.025 >1.030   Blood  Urine      NEG:Negative Moderate (A) Moderate (A)   pH Urine      5.0 - 7.0 pH 5.5 5.5   Protein Albumin Urine      NEG:Negative mg/dL 30 (A) Negative   Urobilinogen mg/dL      0.0 - 2.0 mg/dL     Nitrite Urine      NEG:Negative Negative Negative   Leukocyte Esterase Urine      NEG:Negative Moderate (A) Negative   Source       Urine Unspecified Urine   WBC Urine      0 - 5 /HPF 5-10 (A) 0 - 5   RBC Urine      0 - 2 /HPF 5-10 (A) 5-10 (A)   Bacteria Urine      NEG:Negative /HPF Moderate (A) Moderate (A)   Squamous Epithelial /HPF Urine      0 - 1 /HPF     Amorphous Crystals      NEG:Negative /HPF     Sodium      133 - 144 mmol/L     Potassium      3.4 - 5.3 mmol/L     Chloride      94 - 109 mmol/L     Carbon Dioxide      20 - 32 mmol/L     Anion Gap      3 - 14 mmol/L     Glucose      70 - 99 mg/dL     Urea Nitrogen      7 - 30 mg/dL     Creatinine      0.52 - 1.04 mg/dL     GFR Estimate      >60 mL/min/1.73:m2     GFR Estimate If Black      >60 mL/min/1.73:m2     Calcium      8.5 - 10.1 mg/dL     Bilirubin Total      0.2 - 1.3 mg/dL     Albumin      3.4 - 5.0 g/dL     Protein Total      6.8 - 8.8 g/dL     Alkaline Phosphatase      40 - 150 U/L     ALT      0 - 50 U/L     AST      0 - 45 U/L     Mucous Urine      NEG:Negative /LPF     Urobilinogen Urine      0.2 - 1.0 EU/dL 0.2 0.2   Squamous Epithelial /LPF Urine      FEW:Few /LPF Moderate (A)    Specimen Description       Unspecified Urine    Special Requests       Specimen received in preservative    Culture Micro       >100,000 colonies/mL . . .      Component      Latest Ref Rng & Units 7/17/2020 7/17/2020          12:34 AM 12:56 AM   WBC      4.0 - 11.0 10e9/L  15.8 (H)   RBC Count      3.8 - 5.2 10e12/L  4.72   Hemoglobin      11.7 - 15.7 g/dL  13.1   Hematocrit      35.0 - 47.0 %  40.5   MCV      78 - 100 fl  86   MCH      26.5 - 33.0 pg  27.8   MCHC      31.5 - 36.5 g/dL  32.3   RDW      10.0 - 15.0 %  13.0   Platelet Count      150 - 450 10e9/L  356   Diff  Method        Automated Method   % Neutrophils      %  65.3   % Lymphocytes      %  23.8   % Monocytes      %  8.5   % Eosinophils      %  1.1   % Basophils      %  0.6   % Immature Granulocytes      %  0.7   Nucleated RBCs      0 /100  0   Absolute Neutrophil      1.6 - 8.3 10e9/L  10.3 (H)   Absolute Lymphocytes      0.8 - 5.3 10e9/L  3.8   Absolute Monocytes      0.0 - 1.3 10e9/L  1.3   Absolute Eosinophils      0.0 - 0.7 10e9/L  0.2   Absolute Basophils      0.0 - 0.2 10e9/L  0.1   Abs Immature Granulocytes      0 - 0.4 10e9/L  0.1   Absolute Nucleated RBC        0.0   Color Urine       Yellow    Appearance Urine       Slightly Cloudy    Glucose Urine      NEG:Negative mg/dL Negative    Bilirubin Urine      NEG:Negative Negative    Ketones Urine      NEG:Negative mg/dL Negative    Specific Gravity Urine      1.003 - 1.035 1.019    Blood Urine      NEG:Negative Small (A)    pH Urine      5.0 - 7.0 pH 6.5    Protein Albumin Urine      NEG:Negative mg/dL Negative    Urobilinogen mg/dL      0.0 - 2.0 mg/dL Normal    Nitrite Urine      NEG:Negative Positive (A)    Leukocyte Esterase Urine      NEG:Negative Large (A)    Source       Midstream Urine    WBC Urine      0 - 5 /HPF 3    RBC Urine      0 - 2 /HPF 2    Bacteria Urine      NEG:Negative /HPF Many (A)    Squamous Epithelial /HPF Urine      0 - 1 /HPF 4 (H)    Amorphous Crystals      NEG:Negative /HPF Moderate (A)    Sodium      133 - 144 mmol/L  140   Potassium      3.4 - 5.3 mmol/L  3.9   Chloride      94 - 109 mmol/L  110 (H)   Carbon Dioxide      20 - 32 mmol/L  24   Anion Gap      3 - 14 mmol/L  5   Glucose      70 - 99 mg/dL  90   Urea Nitrogen      7 - 30 mg/dL  12   Creatinine      0.52 - 1.04 mg/dL  0.84   GFR Estimate      >60 mL/min/1.73:m2  >90   GFR Estimate If Black      >60 mL/min/1.73:m2  >90   Calcium      8.5 - 10.1 mg/dL  9.0   Bilirubin Total      0.2 - 1.3 mg/dL  0.2   Albumin      3.4 - 5.0 g/dL  3.6   Protein Total      6.8 - 8.8 g/dL  7.5    Alkaline Phosphatase      40 - 150 U/L  113   ALT      0 - 50 U/L  36   AST      0 - 45 U/L  16   Mucous Urine      NEG:Negative /LPF     Urobilinogen Urine      0.2 - 1.0 EU/dL     Squamous Epithelial /LPF Urine      FEW:Few /LPF     Specimen Description       Midstream Urine    Special Requests       Specimen received in preservative    Culture Micro       >100,000 colonies/mL . . .      Component      Latest Ref Rng & Units 7/17/2020 7/28/2020           6:42 AM 12:00 PM   WBC      4.0 - 11.0 10e9/L 14.5 (H)    RBC Count      3.8 - 5.2 10e12/L 4.53    Hemoglobin      11.7 - 15.7 g/dL 12.3    Hematocrit      35.0 - 47.0 % 38.8    MCV      78 - 100 fl 86    MCH      26.5 - 33.0 pg 27.2    MCHC      31.5 - 36.5 g/dL 31.7    RDW      10.0 - 15.0 % 13.1    Platelet Count      150 - 450 10e9/L 320    Diff Method           % Neutrophils      %     % Lymphocytes      %     % Monocytes      %     % Eosinophils      %     % Basophils      %     % Immature Granulocytes      %     Nucleated RBCs      0 /100     Absolute Neutrophil      1.6 - 8.3 10e9/L     Absolute Lymphocytes      0.8 - 5.3 10e9/L     Absolute Monocytes      0.0 - 1.3 10e9/L     Absolute Eosinophils      0.0 - 0.7 10e9/L     Absolute Basophils      0.0 - 0.2 10e9/L     Abs Immature Granulocytes      0 - 0.4 10e9/L     Absolute Nucleated RBC           Color Urine        Yellow   Appearance Urine        Slightly Cloudy   Glucose Urine      NEG:Negative mg/dL  Negative   Bilirubin Urine      NEG:Negative  Negative   Ketones Urine      NEG:Negative mg/dL  Negative   Specific Gravity Urine      1.003 - 1.035  1.025   Blood Urine      NEG:Negative  Trace (A)   pH Urine      5.0 - 7.0 pH  6.0   Protein Albumin Urine      NEG:Negative mg/dL  Negative   Urobilinogen mg/dL      0.0 - 2.0 mg/dL     Nitrite Urine      NEG:Negative  Negative   Leukocyte Esterase Urine      NEG:Negative  Trace (A)   Source        Midstream Urine   WBC Urine      0 - 5 /HPF  5-10 (A)    RBC Urine      0 - 2 /HPF  2-5 (A)   Bacteria Urine      NEG:Negative /HPF  Moderate (A)   Squamous Epithelial /HPF Urine      0 - 1 /HPF     Amorphous Crystals      NEG:Negative /HPF     Sodium      133 - 144 mmol/L     Potassium      3.4 - 5.3 mmol/L     Chloride      94 - 109 mmol/L     Carbon Dioxide      20 - 32 mmol/L     Anion Gap      3 - 14 mmol/L     Glucose      70 - 99 mg/dL     Urea Nitrogen      7 - 30 mg/dL     Creatinine      0.52 - 1.04 mg/dL     GFR Estimate      >60 mL/min/1.73:m2     GFR Estimate If Black      >60 mL/min/1.73:m2     Calcium      8.5 - 10.1 mg/dL     Bilirubin Total      0.2 - 1.3 mg/dL     Albumin      3.4 - 5.0 g/dL     Protein Total      6.8 - 8.8 g/dL     Alkaline Phosphatase      40 - 150 U/L     ALT      0 - 50 U/L     AST      0 - 45 U/L     Mucous Urine      NEG:Negative /LPF     Urobilinogen Urine      0.2 - 1.0 EU/dL  0.2   Squamous Epithelial /LPF Urine      FEW:Few /LPF  Moderate (A)   Specimen Description           Special Requests           Culture Micro             Component      Latest Ref Rng & Units 7/28/2020          12:00 PM   WBC      4.0 - 11.0 10e9/L    RBC Count      3.8 - 5.2 10e12/L    Hemoglobin      11.7 - 15.7 g/dL    Hematocrit      35.0 - 47.0 %    MCV      78 - 100 fl    MCH      26.5 - 33.0 pg    MCHC      31.5 - 36.5 g/dL    RDW      10.0 - 15.0 %    Platelet Count      150 - 450 10e9/L    Diff Method          % Neutrophils      %    % Lymphocytes      %    % Monocytes      %    % Eosinophils      %    % Basophils      %    % Immature Granulocytes      %    Nucleated RBCs      0 /100    Absolute Neutrophil      1.6 - 8.3 10e9/L    Absolute Lymphocytes      0.8 - 5.3 10e9/L    Absolute Monocytes      0.0 - 1.3 10e9/L    Absolute Eosinophils      0.0 - 0.7 10e9/L    Absolute Basophils      0.0 - 0.2 10e9/L    Abs Immature Granulocytes      0 - 0.4 10e9/L    Absolute Nucleated RBC          Color Urine          Appearance Urine          Glucose  Urine      NEG:Negative mg/dL    Bilirubin Urine      NEG:Negative    Ketones Urine      NEG:Negative mg/dL    Specific Gravity Urine      1.003 - 1.035    Blood Urine      NEG:Negative    pH Urine      5.0 - 7.0 pH    Protein Albumin Urine      NEG:Negative mg/dL    Urobilinogen mg/dL      0.0 - 2.0 mg/dL    Nitrite Urine      NEG:Negative    Leukocyte Esterase Urine      NEG:Negative    Source          WBC Urine      0 - 5 /HPF    RBC Urine      0 - 2 /HPF    Bacteria Urine      NEG:Negative /HPF    Squamous Epithelial /HPF Urine      0 - 1 /HPF    Amorphous Crystals      NEG:Negative /HPF    Sodium      133 - 144 mmol/L    Potassium      3.4 - 5.3 mmol/L    Chloride      94 - 109 mmol/L    Carbon Dioxide      20 - 32 mmol/L    Anion Gap      3 - 14 mmol/L    Glucose      70 - 99 mg/dL    Urea Nitrogen      7 - 30 mg/dL    Creatinine      0.52 - 1.04 mg/dL    GFR Estimate      >60 mL/min/1.73:m2    GFR Estimate If Black      >60 mL/min/1.73:m2    Calcium      8.5 - 10.1 mg/dL    Bilirubin Total      0.2 - 1.3 mg/dL    Albumin      3.4 - 5.0 g/dL    Protein Total      6.8 - 8.8 g/dL    Alkaline Phosphatase      40 - 150 U/L    ALT      0 - 50 U/L    AST      0 - 45 U/L    Mucous Urine      NEG:Negative /LPF    Urobilinogen Urine      0.2 - 1.0 EU/dL    Squamous Epithelial /LPF Urine      FEW:Few /LPF    Specimen Description       Midstream Urine   Special Requests       Specimen received in preservative   Culture Micro       >100,000 colonies/mL . . .     Component      Latest Ref Rng & Units 7/28/2020          12:00 PM   WBC      4.0 - 11.0 10e9/L    RBC Count      3.8 - 5.2 10e12/L    Hemoglobin      11.7 - 15.7 g/dL    Hematocrit      35.0 - 47.0 %    MCV      78 - 100 fl    MCH      26.5 - 33.0 pg    MCHC      31.5 - 36.5 g/dL    RDW      10.0 - 15.0 %    Platelet Count      150 - 450 10e9/L    Diff Method          % Neutrophils      %    % Lymphocytes      %    % Monocytes      %    % Eosinophils      %    %  Basophils      %    % Immature Granulocytes      %    Nucleated RBCs      0 /100    Absolute Neutrophil      1.6 - 8.3 10e9/L    Absolute Lymphocytes      0.8 - 5.3 10e9/L    Absolute Monocytes      0.0 - 1.3 10e9/L    Absolute Eosinophils      0.0 - 0.7 10e9/L    Absolute Basophils      0.0 - 0.2 10e9/L    Abs Immature Granulocytes      0 - 0.4 10e9/L    Absolute Nucleated RBC          Color Urine          Appearance Urine          Glucose Urine      NEG:Negative mg/dL    Bilirubin Urine      NEG:Negative    Ketones Urine      NEG:Negative mg/dL    Specific Gravity Urine      1.003 - 1.035    Blood Urine      NEG:Negative    pH Urine      5.0 - 7.0 pH    Protein Albumin Urine      NEG:Negative mg/dL    Urobilinogen mg/dL      0.0 - 2.0 mg/dL    Nitrite Urine      NEG:Negative    Leukocyte Esterase Urine      NEG:Negative    Source          WBC Urine      0 - 5 /HPF    RBC Urine      0 - 2 /HPF    Bacteria Urine      NEG:Negative /HPF    Squamous Epithelial /HPF Urine      0 - 1 /HPF    Amorphous Crystals      NEG:Negative /HPF    Sodium      133 - 144 mmol/L    Potassium      3.4 - 5.3 mmol/L    Chloride      94 - 109 mmol/L    Carbon Dioxide      20 - 32 mmol/L    Anion Gap      3 - 14 mmol/L    Glucose      70 - 99 mg/dL    Urea Nitrogen      7 - 30 mg/dL    Creatinine      0.52 - 1.04 mg/dL    GFR Estimate      >60 mL/min/1.73:m2    GFR Estimate If Black      >60 mL/min/1.73:m2    Calcium      8.5 - 10.1 mg/dL    Bilirubin Total      0.2 - 1.3 mg/dL    Albumin      3.4 - 5.0 g/dL    Protein Total      6.8 - 8.8 g/dL    Alkaline Phosphatase      40 - 150 U/L    ALT      0 - 50 U/L    AST      0 - 45 U/L    Mucous Urine      NEG:Negative /LPF    Urobilinogen Urine      0.2 - 1.0 EU/dL    Squamous Epithelial /LPF Urine      FEW:Few /LPF    Specimen Description          Special Requests          Culture Micro       Multiple morphotypes present with no predominant organism.  Growth consistent with . . .      Component      Latest Ref Rng & Units 7/28/2020 8/7/2020          12:34 PM    WBC      4.0 - 11.0 10e9/L 10.7    RBC Count      3.8 - 5.2 10e12/L 5.18    Hemoglobin      11.7 - 15.7 g/dL 13.8    Hematocrit      35.0 - 47.0 % 43.4    MCV      78 - 100 fl 84    MCH      26.5 - 33.0 pg 26.6    MCHC      31.5 - 36.5 g/dL 31.8    RDW      10.0 - 15.0 % 13.4    Platelet Count      150 - 450 10e9/L 336    Diff Method       Automated Method    % Neutrophils      % 64.5    % Lymphocytes      % 25.9    % Monocytes      % 6.9    % Eosinophils      % 2.3    % Basophils      % 0.4    % Immature Granulocytes      %     Nucleated RBCs      0 /100     Absolute Neutrophil      1.6 - 8.3 10e9/L 6.9    Absolute Lymphocytes      0.8 - 5.3 10e9/L 2.8    Absolute Monocytes      0.0 - 1.3 10e9/L 0.7    Absolute Eosinophils      0.0 - 0.7 10e9/L 0.3    Absolute Basophils      0.0 - 0.2 10e9/L 0.0    Abs Immature Granulocytes      0 - 0.4 10e9/L     Absolute Nucleated RBC           Color Urine        Yellow   Appearance Urine        Clear   Glucose Urine      NEG:Negative mg/dL  Negative   Bilirubin Urine      NEG:Negative  Negative   Ketones Urine      NEG:Negative mg/dL  Negative   Specific Gravity Urine      1.003 - 1.035  1.019   Blood Urine      NEG:Negative  Large (A)   pH Urine      5.0 - 7.0 pH  6.0   Protein Albumin Urine      NEG:Negative mg/dL  Negative   Urobilinogen mg/dL      0.0 - 2.0 mg/dL  Normal   Nitrite Urine      NEG:Negative  Negative   Leukocyte Esterase Urine      NEG:Negative  Trace (A)   Source        Midstream Urine   WBC Urine      0 - 5 /HPF  6 (H)   RBC Urine      0 - 2 /HPF  29 (H)   Bacteria Urine      NEG:Negative /HPF     Squamous Epithelial /HPF Urine      0 - 1 /HPF  4 (H)   Amorphous Crystals      NEG:Negative /HPF     Sodium      133 - 144 mmol/L     Potassium      3.4 - 5.3 mmol/L     Chloride      94 - 109 mmol/L     Carbon Dioxide      20 - 32 mmol/L     Anion Gap      3 - 14 mmol/L     Glucose       70 - 99 mg/dL     Urea Nitrogen      7 - 30 mg/dL     Creatinine      0.52 - 1.04 mg/dL     GFR Estimate      >60 mL/min/1.73:m2     GFR Estimate If Black      >60 mL/min/1.73:m2     Calcium      8.5 - 10.1 mg/dL     Bilirubin Total      0.2 - 1.3 mg/dL     Albumin      3.4 - 5.0 g/dL     Protein Total      6.8 - 8.8 g/dL     Alkaline Phosphatase      40 - 150 U/L     ALT      0 - 50 U/L     AST      0 - 45 U/L     Mucous Urine      NEG:Negative /LPF  Present (A)   Urobilinogen Urine      0.2 - 1.0 EU/dL     Squamous Epithelial /LPF Urine      FEW:Few /LPF     Specimen Description           Special Requests           Culture Micro                 A-  Discussed that I highly doubt her back pain or UTIs are related to the right nonobstructing kidney stone(s).  Really her recent episodes of abdomen pain that brought her to the ER July and August are NOT felt due to the small nonobstructing right renal stone.  Discussed that on occasion a stone can be a source of a persisting UTI, but she does not have evidence of this on recent UCx's.      Plan  - discussed observation versus removal tiny nonobstructing right renal stone(s).  She would tentatively like these removed.  - discussed that I DON'T think stone removal will help her pain.  I think her pain is musculoskeletal, in my estimation.  She understands and would like to remove the stone anyway.  Discussed that procedure would be under general anesthesia, stone would be removed with a scope up to the right kidney, and I would place a ureteral stent that would need to be removed a week later in clinic.  Discussed that if ureter is too narrow to get ureteroscope into right kidney, I would place a stent and she would need a second-look procedure.  She is understanding of these risks.  Other risks of ureteroscopy include ureteral injury, bleeding, infection, all of which are low.  - I advised her to see ortho to workup possible musculoskeletal pain.    - schedule right  ureteroscopy, possible holmium laser lithotripsy, right ureteral stent placement at [facility].  Pre-op H and P with her PCP.      Anita GERARDO    11 min phone call, starting 11:24 AM.

## 2020-08-13 ENCOUNTER — TELEPHONE (OUTPATIENT)
Dept: UROLOGY | Facility: CLINIC | Age: 20
End: 2020-08-13

## 2020-08-14 NOTE — TELEPHONE ENCOUNTER
Per surgery scheduler, Dr. Velasco does not have availability that works for patient. Dr. Fontanez agreed to have a virtual appointment with patient on 8/18 to discuss surgery.    Left generic voicemail for patient to return call to clinic. When call is returned, will inform patient that Dr. Fontanez would like a virtual visit for 8/18 to discuss surgery and will assist in making that appointment.    Verito Reed LPN

## 2020-08-14 NOTE — TELEPHONE ENCOUNTER
Patient returned call to clinic.Assisted in scheduling a video visit with Dr. Fontanez for 8/18. Patient had no further questions.    Verito Reed LPN

## 2020-08-18 ENCOUNTER — VIRTUAL VISIT (OUTPATIENT)
Dept: UROLOGY | Facility: CLINIC | Age: 20
End: 2020-08-18
Payer: COMMERCIAL

## 2020-08-18 DIAGNOSIS — N20.0 RIGHT KIDNEY STONE: Primary | ICD-10-CM

## 2020-08-18 DIAGNOSIS — Z11.59 ENCOUNTER FOR SCREENING FOR OTHER VIRAL DISEASES: Primary | ICD-10-CM

## 2020-08-18 PROCEDURE — 99213 OFFICE O/P EST LOW 20 MIN: CPT | Mod: 95 | Performed by: UROLOGY

## 2020-08-18 NOTE — PROGRESS NOTES
"Ximena Diaz is a 19 year old female who is being evaluated via a billable video visit.      The patient has been notified of following:     \"This video visit will be conducted via a call between you and your physician/provider. We have found that certain health care needs can be provided without the need for an in-person physical exam.  This service lets us provide the care you need with a video conversation.  If a prescription is necessary we can send it directly to your pharmacy.  If lab work is needed we can place an order for that and you can then stop by our lab to have the test done at a later time.    Video visits are billed at different rates depending on your insurance coverage.  Please reach out to your insurance provider with any questions.    If during the course of the call the physician/provider feels a video visit is not appropriate, you will not be charged for this service.\"    Patient has given verbal consent for Video visit? Yes  How would you like to obtain your AVS? MyChart  If you are dropped from the video visit, the video invite should be resent to: Text to cell phone: [phone]  Will anyone else be joining your video visit? No    PROVIDER NOTE:  [place]  CHIEF COMPLAINT   It was my pleasure to see Ximena Diaz who is a 19 year old female for follow-up of right kidney stone.      HPI   Ximena Diaz is a very pleasant 19 year old female    Has had continued intermittent   Sharp shooting pain throughout her whole body  Pain lasts for about five minutes  Pain seems to start in right lower body  Pain radiates up her spine and then down both legs  Passed a left sided stone this spring  She is on my partner Dr. Velasco and was counseled that this stone is likely not the source of her pain, however she is adamant that the stone is removed and tentatively scheduled for surgery in mid-September.  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  There is no height or weight on file to calculate BMI.  General Appearance Adult:   Alert, no acute distress, oriented  HENT: throat/mouth:normal, good dentition  Lungs: no respiratory distress, or pursed lip breathing  Heart: No obvious jugular venous distension present  Abdomen: obesely - distended  Musculoskeltal: extremities normal, no peripheral edema  Skin: no suspicious lesions or rashes  Neuro: Alert, oriented, speech and mentation normal  Psych: affect and mood normal  Gait: Normal     UA RESULTS:  Recent Labs   Lab Test 08/07/20  0139 07/28/20  1200   COLOR Yellow Yellow   APPEARANCE Clear Slightly Cloudy   URINEGLC Negative Negative   URINEBILI Negative Negative   URINEKETONE Negative Negative   SG 1.019 1.025   UBLD Large* Trace*   URINEPH 6.0 6.0   PROTEIN Negative Negative   UROBILINOGEN  --  0.2   NITRITE Negative Negative   LEUKEST Trace* Trace*   RBCU 29* 2-5*   WBCU 6* 5-10*     Creatinine   Date Value Ref Range Status   07/17/2020 0.82 0.52 - 1.04 mg/dL Final      IMAGING:  All pertinent imaging reviewed:    All imaging studies reviewed by me.  I personally reviewed these imaging films.  A formal report from radiology will follow.              ASSESSMENT and PLAN  19-year-old female with right-sided back and flank pain.  Noted to have a 2 to 3 mm right kidney stone    2 to 3 mm right kidney stone  -We again discussed that this small right kidney stone is unlikely to be the source of her intermittent pain  -We discussed the option for stone removal with flexible ureteroscopy and we discussed the risks and benefits of this procedure.  Specifically we discussed the risk of injury to the ureter, future ureteral stricture, 5 to 10% chance of requiring a staged procedure of her ureter is too narrow for primary ureteroscopy, and that there is high likelihood that removal  of the stone will not change her pain symptoms  -She again fully understands these risks and would like to proceed with stone removal  -We will aim for surgery date of August 25, 2020      I spent over 15 minutes with the patient.  Over half this time was spent on counseling regarding the above.    Chago Fontanez MD   Urology  [facility]  [facility] Phone: [phone]  [facility] Phone: [phone]      Video-Visit Details    Type of service:  Video Visit    Video Start Time: 1:21 PM  Video End Time: 1:36 PM    Originating Location (pt. Location): Home    Distant Location (provider location):  [facility]     Platform used for Video Visit: [employer]    Chago Fontanez MD

## 2020-08-20 ENCOUNTER — OFFICE VISIT (OUTPATIENT)
Dept: FAMILY MEDICINE | Facility: CLINIC | Age: 20
End: 2020-08-20
Payer: COMMERCIAL

## 2020-08-20 VITALS
RESPIRATION RATE: 16 BRPM | DIASTOLIC BLOOD PRESSURE: 68 MMHG | HEART RATE: 97 BPM | TEMPERATURE: 97.9 F | HEIGHT: 64 IN | BODY MASS INDEX: 41.48 KG/M2 | SYSTOLIC BLOOD PRESSURE: 112 MMHG | OXYGEN SATURATION: 99 % | WEIGHT: 243 LBS

## 2020-08-20 DIAGNOSIS — Z01.818 PRE-OP EXAM: Primary | ICD-10-CM

## 2020-08-20 DIAGNOSIS — N20.0 RIGHT KIDNEY STONE: ICD-10-CM

## 2020-08-20 PROCEDURE — 99214 OFFICE O/P EST MOD 30 MIN: CPT | Performed by: PHYSICIAN ASSISTANT

## 2020-08-20 ASSESSMENT — MIFFLIN-ST. JEOR: SCORE: 1852.24

## 2020-08-20 NOTE — PROGRESS NOTES
[facility]  [address] [facility] [address]  Phone: [phone]  Primary Provider: Andrade Holley  Pre-op Performing Provider: ANDRADE HOLLEY    PREOPERATIVE EVALUATION:  Today's date: 8/20/2020    Ximena Diaz is a 19 year old female who presents for a preoperative evaluation.    Surgical Information:  Surgery Details 8/19/2020   Surgery/Procedure: Kidney stone removel   Surgery Location: [facility]   Surgeon: -   Surgery Date: 08/25/2020   Time of Surgery: 11:25am   Where patient plans to recover: At home with family     Fax number for surgical facility: Note does not need to be faxed, will be available electronically in Epic.  Type of Anesthesia Anticipated: General    Subjective     HPI related to upcoming procedure: 1 yr of off and on flank pain. Found to have a kidney stone.     Preop Questions 8/19/2020   Have you ever had a heart attack or stroke? No   Have you ever had surgery on your heart or blood vessels, such as a stent placement, a coronary artery bypass, or surgery on an artery in your head, neck, heart, or legs? No   Do you have chest pain with activity? No   Do you have a history of  heart failure? No   Do you currently have a cold, bronchitis or symptoms of other infection? No   Do you have a cough, shortness of breath, or wheezing? No   Do you or anyone in your family have previous history of blood clots? No   Do you or does anyone in your family have a serious bleeding problem such as prolonged bleeding following surgeries or cuts? No   Have you ever had problems with anemia or been told to take iron pills? No   Have you had any abnormal blood loss such as black, tarry or bloody stools, or abnormal vaginal bleeding? No   Have you ever had a blood transfusion? No   Are you willing to have a blood transfusion if it is medically needed before, during, or after your surgery? Yes   Have you or any of your relatives ever had problems with  anesthesia? No   Do you have sleep apnea, excessive snoring or daytime drowsiness? No   Do you have any artifical heart valves or other implanted medical devices like a pacemaker, defibrillator, or continuous glucose monitor? No   Do you have artificial joints? No   Are you allergic to latex? No   Is there any chance that you may be pregnant? No     Patient does not have a Health Care Directive or Living Will: No    Review of Systems  CONSTITUTIONAL: NEGATIVE for fever, chills, change in weight  ENT/MOUTH: NEGATIVE for ear, mouth and throat problems  RESP: NEGATIVE for significant cough or SOB  CV: NEGATIVE for chest pain, palpitations or peripheral edema    Patient Active Problem List    Diagnosis Date Noted     Right kidney stone 08/18/2020     Priority: Medium     Added automatically from request for surgery [id number]       Flank pain 07/17/2020     Priority: Medium     Pyelonephritis 07/17/2020     Priority: Medium     Acute recurrent streptococcal tonsillitis 03/19/2019     Priority: Medium     Nexplanon in place 09/26/2018     Priority: Medium     Placed 9/26/2018         Abdominal pain, left upper quadrant 02/15/2016     Priority: Medium     Non-intractable vomiting with nausea, vomiting of unspecified type 02/15/2016     Priority: Medium     Esophageal reflux 09/03/2015     Priority: Medium     Nut allergy 09/03/2015     Priority: Medium      History reviewed. No pertinent past medical history.  Past Surgical History:   Procedure Laterality Date     ESOPHAGOSCOPY, GASTROSCOPY, DUODENOSCOPY (EGD), COMBINED N/A 2/17/2016    Procedure: COMBINED ESOPHAGOSCOPY, GASTROSCOPY, DUODENOSCOPY (EGD);  Surgeon: Arnav Schmid MD;  Location:  OR     ESOPHAGOSCOPY, GASTROSCOPY, DUODENOSCOPY (EGD), COMBINED N/A 2/17/2016    Procedure: COMBINED ESOPHAGOSCOPY, GASTROSCOPY, DUODENOSCOPY (EGD), BIOPSY SINGLE OR MULTIPLE;  Surgeon: Arnav Schmid MD;  Location:  OR     NO HISTORY OF SURGERY       TONSILLECTOMY, ADENOIDECTOMY,  "COMBINED Bilateral 3/25/2019    Procedure: BILATERAL  TONSILLECTOMY;  Surgeon: Giovanni Travis MD;  Location: [facility] OR     Current Outpatient Medications   Medication Sig Dispense Refill     acetaminophen (TYLENOL) 325 MG tablet Take 2 tablets (650 mg) by mouth every 4 hours as needed for mild pain       etonogestrel (IMPLANON/NEXPLANON) 68 MG IMPL 1 each (68 mg) by Subdermal route continuous  0     fexofenadine (ALLEGRA) 180 MG tablet Take 1 tablet (180 mg) by mouth daily 30 tablet 1       Allergies   Allergen Reactions     Nuts Anaphylaxis     No Clinical Screening - See Comments Rash     Nickel         Social History     Tobacco Use     Smoking status: Never Smoker     Smokeless tobacco: Never Used   Substance Use Topics     Alcohol use: Yes     Comment: on weekends     History reviewed. No pertinent family history.  History   Drug Use No            Objective   /68   Pulse 97   Temp 97.9  F (36.6  C) (Tympanic)   Resp 16   Ht 1.626 m (5' 4\")   Wt 110.2 kg (243 lb)   LMP 08/07/2020   SpO2 99%   BMI 41.71 kg/m    Physical Exam    GENERAL APPEARANCE: healthy, alert and no distress     EYES: EOMI, PERRL     HENT: ear canals and TM's normal and nose and mouth without ulcers or lesions     NECK: no adenopathy, no asymmetry, masses, or scars and thyroid normal to palpation     RESP: lungs clear to auscultation - no rales, rhonchi or wheezes     CV: regular rates and rhythm, normal S1 S2, no S3 or S4 and no murmur, click or rub     ABDOMEN:  soft, nontender, no HSM or masses and bowel sounds normal     MS: extremities normal- no gross deformities noted, no evidence of inflammation in joints, FROM in all extremities.     SKIN: no suspicious lesions or rashes     NEURO: Normal strength and tone, sensory exam grossly normal, mentation intact and speech normal     PSYCH: mentation appears normal. and affect normal/bright     LYMPHATICS: No cervical adenopathy    Recent Labs   Lab Test 07/28/20  1234 " 07/17/20  0642 07/17/20  0056   HGB 13.8 12.3 13.1    320 356   NA  --  138 140   POTASSIUM  --  3.5 3.9   CR  --  0.82 0.84        PRE-OP Diagnostics:  No labs were ordered during this visit.  No EKG required for low risk surgery (cataract, skin procedure, breast biopsy, etc).         Assessment & Plan   The proposed surgical procedure is considered LOW risk.    REVISED CARDIAC RISK INDEX  The patient has the following serious cardiovascular risks for perioperative complications:  No serious cardiac risks = 0 points    INTERPRETATION: 0 points: Class I (very low risk - 0.4% complication rate)         ICD-10-CM    1. Pre-op exam  Z01.818    2. Right kidney stone  N20.0        The patient has the following additional risks and recommendations for perioperative complications:     - No identified additional risk factors other than previously addressed     MEDICATION INSTRUCTIONS:  Patient is on no chronic medications    RECOMMENDATION:  APPROVAL GIVEN to proceed with proposed procedure, without further diagnostic evaluation.    No follow-ups on file.    Signed Electronically by: Curtis Holley PA-C  Discussed this patient with  Curtis Holley and agree with above assessment and plan. The patient is an acceptable candidate for the proposed anasthesia.  Edmundo Ruiz MD    Copy of this evaluation report is provided to requesting physician.    [facility] Guidelines    Revised Cardiac Risk Index

## 2020-08-21 DIAGNOSIS — Z11.59 ENCOUNTER FOR SCREENING FOR OTHER VIRAL DISEASES: ICD-10-CM

## 2020-08-21 PROCEDURE — U0003 INFECTIOUS AGENT DETECTION BY NUCLEIC ACID (DNA OR RNA); SEVERE ACUTE RESPIRATORY SYNDROME CORONAVIRUS 2 (SARS-COV-2) (CORONAVIRUS DISEASE [COVID-19]), AMPLIFIED PROBE TECHNIQUE, MAKING USE OF HIGH THROUGHPUT TECHNOLOGIES AS DESCRIBED BY CMS-2020-01-R: HCPCS | Performed by: UROLOGY

## 2020-08-22 LAB
SARS-COV-2 RNA SPEC QL NAA+PROBE: NORMAL
SPECIMEN SOURCE: NORMAL

## 2020-08-23 LAB
LABORATORY COMMENT REPORT: NORMAL
SARS-COV-2 RNA SPEC QL NAA+PROBE: NEGATIVE
SPECIMEN SOURCE: NORMAL

## 2020-08-24 ENCOUNTER — ANESTHESIA EVENT (OUTPATIENT)
Dept: SURGERY | Facility: AMBULATORY SURGERY CENTER | Age: 20
End: 2020-08-24

## 2020-08-25 ENCOUNTER — ANESTHESIA (OUTPATIENT)
Dept: SURGERY | Facility: AMBULATORY SURGERY CENTER | Age: 20
End: 2020-08-25

## 2020-08-25 ENCOUNTER — HOSPITAL ENCOUNTER (OUTPATIENT)
Facility: AMBULATORY SURGERY CENTER | Age: 20
Discharge: HOME | End: 2020-08-25
Attending: UROLOGY | Admitting: UROLOGY
Payer: COMMERCIAL

## 2020-08-25 ENCOUNTER — ANCILLARY PROCEDURE (OUTPATIENT)
Dept: GENERAL RADIOLOGY | Facility: CLINIC | Age: 20
End: 2020-08-25
Attending: UROLOGY
Payer: COMMERCIAL

## 2020-08-25 VITALS
TEMPERATURE: 96.8 F | SYSTOLIC BLOOD PRESSURE: 103 MMHG | DIASTOLIC BLOOD PRESSURE: 76 MMHG | RESPIRATION RATE: 18 BRPM | OXYGEN SATURATION: 99 %

## 2020-08-25 DIAGNOSIS — N20.0 RIGHT KIDNEY STONE: ICD-10-CM

## 2020-08-25 DIAGNOSIS — R10.9 FLANK PAIN: Primary | ICD-10-CM

## 2020-08-25 LAB — HCG UR QL: NEGATIVE

## 2020-08-25 PROCEDURE — 81025 URINE PREGNANCY TEST: CPT | Performed by: ANESTHESIOLOGY

## 2020-08-25 PROCEDURE — 52332 CYSTOSCOPY AND TREATMENT: CPT | Mod: RT

## 2020-08-25 PROCEDURE — G8907 PT DOC NO EVENTS ON DISCHARG: HCPCS

## 2020-08-25 PROCEDURE — 52352 CYSTOURETERO W/STONE REMOVE: CPT | Mod: RT

## 2020-08-25 PROCEDURE — 99000 SPECIMEN HANDLING OFFICE-LAB: CPT | Performed by: UROLOGY

## 2020-08-25 PROCEDURE — 82365 CALCULUS SPECTROSCOPY: CPT | Mod: 90 | Performed by: UROLOGY

## 2020-08-25 PROCEDURE — G8916 PT W IV AB GIVEN ON TIME: HCPCS

## 2020-08-25 DEVICE — STENT URETERAL PERCUFLEX PLUS 6FRX24CM M0061752620: Type: IMPLANTABLE DEVICE | Site: KIDNEY | Status: FUNCTIONAL

## 2020-08-25 RX ORDER — FUROSEMIDE 10 MG/ML
INJECTION INTRAMUSCULAR; INTRAVENOUS PRN
Status: DISCONTINUED | OUTPATIENT
Start: 2020-08-25 | End: 2020-08-25

## 2020-08-25 RX ORDER — KETOROLAC TROMETHAMINE 10 MG/1
10 TABLET, FILM COATED ORAL EVERY 6 HOURS
Qty: 20 TABLET | Refills: 0 | Status: SHIPPED | OUTPATIENT
Start: 2020-08-25 | End: 2020-08-30

## 2020-08-25 RX ORDER — OXYCODONE HYDROCHLORIDE 5 MG/1
5 TABLET ORAL EVERY 4 HOURS PRN
Status: DISCONTINUED | OUTPATIENT
Start: 2020-08-25 | End: 2020-08-26 | Stop reason: HOSPADM

## 2020-08-25 RX ORDER — PROPOFOL 10 MG/ML
INJECTION, EMULSION INTRAVENOUS PRN
Status: DISCONTINUED | OUTPATIENT
Start: 2020-08-25 | End: 2020-08-25

## 2020-08-25 RX ORDER — ONDANSETRON 2 MG/ML
4 INJECTION INTRAMUSCULAR; INTRAVENOUS EVERY 30 MIN PRN
Status: DISCONTINUED | OUTPATIENT
Start: 2020-08-25 | End: 2020-08-26 | Stop reason: HOSPADM

## 2020-08-25 RX ORDER — ONDANSETRON 4 MG/1
4 TABLET, ORALLY DISINTEGRATING ORAL EVERY 30 MIN PRN
Status: DISCONTINUED | OUTPATIENT
Start: 2020-08-25 | End: 2020-08-26 | Stop reason: HOSPADM

## 2020-08-25 RX ORDER — KETOROLAC TROMETHAMINE 30 MG/ML
INJECTION, SOLUTION INTRAMUSCULAR; INTRAVENOUS PRN
Status: DISCONTINUED | OUTPATIENT
Start: 2020-08-25 | End: 2020-08-25

## 2020-08-25 RX ORDER — MEPERIDINE HYDROCHLORIDE 25 MG/ML
12.5 INJECTION INTRAMUSCULAR; INTRAVENOUS; SUBCUTANEOUS
Status: DISCONTINUED | OUTPATIENT
Start: 2020-08-25 | End: 2020-08-26 | Stop reason: HOSPADM

## 2020-08-25 RX ORDER — TAMSULOSIN HYDROCHLORIDE 0.4 MG/1
0.4 CAPSULE ORAL DAILY
Qty: 10 CAPSULE | Refills: 0 | Status: SHIPPED | OUTPATIENT
Start: 2020-08-25 | End: 2020-09-23

## 2020-08-25 RX ORDER — IBUPROFEN 200 MG
200 TABLET ORAL EVERY 4 HOURS PRN
COMMUNITY
End: 2022-03-30

## 2020-08-25 RX ORDER — PROPOFOL 10 MG/ML
INJECTION, EMULSION INTRAVENOUS CONTINUOUS PRN
Status: DISCONTINUED | OUTPATIENT
Start: 2020-08-25 | End: 2020-08-25

## 2020-08-25 RX ORDER — NALOXONE HYDROCHLORIDE 0.4 MG/ML
.1-.4 INJECTION, SOLUTION INTRAMUSCULAR; INTRAVENOUS; SUBCUTANEOUS
Status: DISCONTINUED | OUTPATIENT
Start: 2020-08-25 | End: 2020-08-26 | Stop reason: HOSPADM

## 2020-08-25 RX ORDER — LIDOCAINE HYDROCHLORIDE 20 MG/ML
INJECTION, SOLUTION INFILTRATION; PERINEURAL PRN
Status: DISCONTINUED | OUTPATIENT
Start: 2020-08-25 | End: 2020-08-25

## 2020-08-25 RX ORDER — FENTANYL CITRATE 50 UG/ML
25-50 INJECTION, SOLUTION INTRAMUSCULAR; INTRAVENOUS EVERY 5 MIN PRN
Status: DISCONTINUED | OUTPATIENT
Start: 2020-08-25 | End: 2020-08-26 | Stop reason: HOSPADM

## 2020-08-25 RX ORDER — FENTANYL CITRATE 50 UG/ML
INJECTION, SOLUTION INTRAMUSCULAR; INTRAVENOUS PRN
Status: DISCONTINUED | OUTPATIENT
Start: 2020-08-25 | End: 2020-08-25

## 2020-08-25 RX ORDER — CEFAZOLIN SODIUM 2 G/100ML
2 INJECTION, SOLUTION INTRAVENOUS
Status: COMPLETED | OUTPATIENT
Start: 2020-08-25 | End: 2020-08-25

## 2020-08-25 RX ORDER — CEFAZOLIN SODIUM 1 G/3ML
1 INJECTION, POWDER, FOR SOLUTION INTRAMUSCULAR; INTRAVENOUS SEE ADMIN INSTRUCTIONS
Status: DISCONTINUED | OUTPATIENT
Start: 2020-08-25 | End: 2020-08-26 | Stop reason: HOSPADM

## 2020-08-25 RX ORDER — OXYCODONE HYDROCHLORIDE 5 MG/1
5 TABLET ORAL EVERY 6 HOURS PRN
Qty: 9 TABLET | Refills: 0 | Status: SHIPPED | OUTPATIENT
Start: 2020-08-25 | End: 2020-09-23

## 2020-08-25 RX ORDER — GABAPENTIN 300 MG/1
300 CAPSULE ORAL ONCE
Status: COMPLETED | OUTPATIENT
Start: 2020-08-25 | End: 2020-08-25

## 2020-08-25 RX ORDER — SODIUM CHLORIDE, SODIUM LACTATE, POTASSIUM CHLORIDE, CALCIUM CHLORIDE 600; 310; 30; 20 MG/100ML; MG/100ML; MG/100ML; MG/100ML
INJECTION, SOLUTION INTRAVENOUS CONTINUOUS
Status: DISCONTINUED | OUTPATIENT
Start: 2020-08-25 | End: 2020-08-26 | Stop reason: HOSPADM

## 2020-08-25 RX ORDER — DEXAMETHASONE SODIUM PHOSPHATE 4 MG/ML
INJECTION, SOLUTION INTRA-ARTICULAR; INTRALESIONAL; INTRAMUSCULAR; INTRAVENOUS; SOFT TISSUE PRN
Status: DISCONTINUED | OUTPATIENT
Start: 2020-08-25 | End: 2020-08-25

## 2020-08-25 RX ORDER — ACETAMINOPHEN 325 MG/1
975 TABLET ORAL ONCE
Status: COMPLETED | OUTPATIENT
Start: 2020-08-25 | End: 2020-08-25

## 2020-08-25 RX ORDER — OXYBUTYNIN CHLORIDE 5 MG/1
5 TABLET, EXTENDED RELEASE ORAL DAILY
Qty: 10 TABLET | Refills: 0 | Status: SHIPPED | OUTPATIENT
Start: 2020-08-25 | End: 2020-09-04

## 2020-08-25 RX ADMIN — ACETAMINOPHEN 975 MG: 325 TABLET ORAL at 11:01

## 2020-08-25 RX ADMIN — FENTANYL CITRATE 50 MCG: 50 INJECTION, SOLUTION INTRAMUSCULAR; INTRAVENOUS at 12:10

## 2020-08-25 RX ADMIN — KETOROLAC TROMETHAMINE 30 MG: 30 INJECTION, SOLUTION INTRAMUSCULAR; INTRAVENOUS at 13:04

## 2020-08-25 RX ADMIN — FUROSEMIDE 20 MG: 10 INJECTION INTRAMUSCULAR; INTRAVENOUS at 13:04

## 2020-08-25 RX ADMIN — CEFAZOLIN SODIUM 2 G: 2 INJECTION, SOLUTION INTRAVENOUS at 12:06

## 2020-08-25 RX ADMIN — SODIUM CHLORIDE, SODIUM LACTATE, POTASSIUM CHLORIDE, CALCIUM CHLORIDE: 600; 310; 30; 20 INJECTION, SOLUTION INTRAVENOUS at 11:02

## 2020-08-25 RX ADMIN — DEXAMETHASONE SODIUM PHOSPHATE 4 MG: 4 INJECTION, SOLUTION INTRA-ARTICULAR; INTRALESIONAL; INTRAMUSCULAR; INTRAVENOUS; SOFT TISSUE at 12:20

## 2020-08-25 RX ADMIN — GABAPENTIN 300 MG: 300 CAPSULE ORAL at 11:02

## 2020-08-25 RX ADMIN — PROPOFOL 50 MCG/KG/MIN: 10 INJECTION, EMULSION INTRAVENOUS at 12:12

## 2020-08-25 RX ADMIN — ONDANSETRON 4 MG: 2 INJECTION INTRAMUSCULAR; INTRAVENOUS at 13:03

## 2020-08-25 RX ADMIN — PROPOFOL 200 MG: 10 INJECTION, EMULSION INTRAVENOUS at 12:10

## 2020-08-25 RX ADMIN — LIDOCAINE HYDROCHLORIDE 3 ML: 20 INJECTION, SOLUTION INFILTRATION; PERINEURAL at 12:10

## 2020-08-25 NOTE — OP NOTE
OPERATIVE REPORT  DATE OF SURGERY: 08/25/20  LOCATION OF SURGERY: [facility]  PREOPERATIVE DIAGNOSIS:  (R10.9) Flank pain  (primary encounter diagnosis)  (N20.0) Right kidney stone  POSTOPERATIVE DIAGNOSIS: (R10.9) Flank pain  (primary encounter diagnosis)  (N20.0) Right kidney stone    START TIME: 12:26 PM  END TIME: 1:08 PM  PROCEDURE PERFORMED:   1. Cystoscopy  2. RIGHT retrograde pyelogram  3. RIGHT ureteral balloon dilation  4. RIGHT ureteroscopy with basketing of stones  5. RIGHT JJ stent placement  6. <1hr physician fluoroscopy time      STAFF SURGEON: Chago Fontanez MD  ANESTHESIA: General.   ESTIMATED BLOOD LOSS: 5 mL.   DRAINS AND TUBES: RIGHT 6fr x 24cm ureteral stent, 16fr moody catheter  COMPLICATIONS: None.   DISPOSITION: PACU.   SPECIMENS OBTAINED:   ID Type Source Tests Collected by Time Destination   1 : RIGHT KIDNEY STONE Calculus/Stone Kidney, Right STONE ANALYSIS Chago Fontanez MD 8/25/2020 12:59 PM      SIGNIFICANT FINDINGS: Cystoscopy with no evidence of stone in the bladder. RIGHT Retrograde Pyelogram with evidence of a narrow distal ureter. Balloon dilation of the distal ureter. Flexible Ureteroscopy with basket removal of two small stone fragments which were adherent to papilla. Ureteral stent placed.      HISTORY OF PRESENT ILLNESS: Negrita Diaz is a 19-year-old female with right-sided back and flank pain.  Noted to have a 2 to 3 mm right kidney stone. She was counseled that non-obstructing stones very rarely cause flank pain and that stone removal may not impact her flank pain. She elected to proceed with the above surgery.     OPERATION PERFORMED:   Informed consent was obtained and the patient was brought to the operating room where general anesthesia was induced. The patient was given appropriate preoperative antibiotics and positioned supine. The patient was then repositioned in dorsal lithotomy with all pressure points padded. We then performed a timeout, verifying the correct  patient's site and procedure to be performed.    22 Surinamese cystoscope was inserted atraumatically into the bladder.  The right ureteral orifice was identified and cannulated with a 0.035 sensor wire which was advanced up the renal pelvis and laparoscopic guidance.  The cystoscope was removed.  An 8 Surinamese dual-lumen catheter was advanced over the wire to the distal ureter where some resistance was noted.  Gentle retrograde pyelogram was performed with a short 5 mm segment of narrowing in the distal ureter with a full caliber ureter proximally. An Amplatz super stiff wire was advanced and the dual lumen was removed.   A ureteral balloon dilator was advanced over the stiff wire across the point of narrowing and inflated to 15 mmHg.  The balloon dilator was deflated and was removed.  Attempt was made to pass an 11 x 13 by 36 access sheath, however this would not easily through the point of narrowing.  The flexible ureteroscope was then advanced over the sensor wire under direct visualization and was able to navigate through the point of narrowing and up to the renal pelvis.  The sensor wire was removed.  Complete pyeloscopy was performed with evidence of the 2-3mm stone partially protruding from a papilla into the collecting system.  There was also a small punctate 1 mm pole calyx.  Both stones were basketed together and removed in entirety.  The ureter was completely visually inspected with no evidence of ureteral injury. A 6fr x 24cm ureteral stent was advanced over the wire with good curl noted in the renal pelvis and in the bladder fluoroscopically. A 16fr moody catheter was placed. She was administered 20mg IV Lasix and 30mg IV toradol.   She was emerged from anesthesia and taken to the PACU in stable condition.    Chago Fontanez MD   Urology  [facility]  Clinic Phone [phone]

## 2020-08-25 NOTE — DISCHARGE INSTRUCTIONS
POSTOPERATIVE INSTRUCTIONS    Diagnosis-------------------------------   RIGHT kidney stones    Procedure-------------------------------  Procedure(s) (LRB):  CYSTOSCOPY, RIGHT RETROGRADE PYELOGRAM, RIGHT URETEROSCOPY AND BASKET REMOVAL OF STONE, Balloon Dilaton,RIGHT URETERAL STENT PLACEMENT (Right)      Findings--------------------------------  Cystoscopy with no evidence of stone in the bladder. RIGHT Retrograde Pyelogram with evidence of a narrow distal ureter. Balloon dilation of the distal ureter. Flexible Ureteroscopy with basket removal of two small stone fragments which were adherent to papilla. Ureteral stent placed.     Home-going instructions-----------------         Activity Limitation:     - No driving or operating heavy machinery while on narcotic pain medication.     FOLLOW THESE INSTRUCTIONS AS INDICATED BELOW:  - Observe operative area for signs of excessive bleeding.  - You may shower.  - Increase fluid intake to promote clear urine.  - Resume usual diet as tolerated    What to expect while recovering-----------  - You may experience some intermittent bleeding that makes your urine pink or cherry colored. This is normal.  - However, if you are unable to urinate, passing large amount of clots, have becca blood in your urine, or have a temperature >101 degrees, call the urology nurse on call, or present to your nearest emergency department.  - You are encouraged to walk daily, and have no activity restrictions.   - A URETERAL STENT has been placed that allows urine to flow unobstructed from your kidney into your bladder.  The stent has a curl in the kidney and a curl in the bladder.  The curl in the bladder can cause some urgency and frequency of urination as well as some mild blood in the urine.  The curl in the kidney can cause some mild flank discomfort.  This may be more noticeable when you urinate.  A URETERAL STENT is meant to be left in temporarily.  It must be removed or changed no later  than 3 months after it's insertion.  If it's not removed it can result in stone overgrowth on the stent that can cause pain, infection, and can be very difficult to remove.      Discharge Medications/instructions:     - Flomax (tamsulosin) to be taken daily until stent is removed    - Oxybutynin 5mg XL (Ditropan XL) to be taken daily until ureteral stent is removed    - Take Tylenol 1000mg every 6 hours for pain    - Take Toradol 10mg every 6 hours as needed for additional pain control    - Take Oxycodone 5mg every 4-6 hours only for break through pain    - Take Colace while taking Oxycodone to prevent constipation      Questions/concerns------------------------  [facility]: [phone]  [facility]: [phone]    Future appointments  You will follow up with Dr. Fontanez next week for ureteral stent removal.    Chago Fontanez MD       [facility]  Same-Day Surgery   Adult Discharge Orders & Instructions   For 24 hours after surgery  1. Get plenty of rest.  A responsible adult must stay with you for at least 24 hours after you leave the hospital.   2. Do not drive or use heavy equipment.  If you have weakness or tingling, don't drive or use heavy equipment until this feeling goes away.  3. Do not drink alcohol.  4. Avoid strenuous or risky activities.  Ask for help when climbing stairs.   5. You may feel lightheaded.  IF so, sit for a few minutes before standing.  Have someone help you get up.   6. If you have nausea (feel sick to your stomach): Drink only clear liquids such as apple juice, ginger ale, broth or 7-Up.  Rest may also help.  Be sure to drink enough fluids.  Move to a regular diet as you feel able.  7. You may have a slight fever. Call the doctor if your fever is over 100 F (37.7 C) (taken under the tongue) or lasts longer than 24 hours.  8. You may have a dry mouth, a sore throat, muscle aches or trouble sleeping.  These should go away after 24  hours.  9. Do not make important or legal decisions.   Call your doctor for any of the followin.  Signs of infection (fever, growing tenderness at the surgery site, a large amount of drainage or bleeding, severe pain, foul-smelling drainage, redness, swelling).    2. It has been over 8 to 10 hours since surgery and you are still not able to urinate (pass water).    3.  Headache for over 24 hours.    4.  Numbness, tingling or weakness the day after surgery (if you had spinal anesthesia).  To contact a doctor, call ___________________________     NO TORADOL UNTIL 7PM  NO TYLENOL UNTIL 5PM

## 2020-08-25 NOTE — ANESTHESIA CARE TRANSFER NOTE
Patient: Ximena Diaz    Procedure(s):  CYSTOSCOPY, RIGHT RETROGRADE PYELOGRAM, RIGHT URETEROSCOPY AND BASKET REMOVAL OF STONE, Balloon Dilaton,RIGHT URETERAL STENT PLACEMENT    Diagnosis: Right kidney stone [N20.0]  Diagnosis Additional Information: No value filed.    Anesthesia Type:   No value filed.     Note:  Airway :Room Air  Patient transferred to:PACU  Comments: Awake, comfortable, sats 99%< Report to RN.Handoff Report: Identifed the Patient, Identified the Reponsible Provider, Reviewed the pertinent medical history, Discussed the surgical course, Reviewed Intra-OP anesthesia mangement and issues during anesthesia, Set expectations for post-procedure period and Allowed opportunity for questions and acknowledgement of understanding      Vitals: (Last set prior to Anesthesia Care Transfer)    CRNA VITALS  8/25/2020 1242 - 8/25/2020 1317      8/25/2020             Pulse:  86    SpO2:  99 %    Resp Rate (observed):  (!) 3                Electronically Signed By: JESICA Byrne CRNA  August 25, 2020  1:17 PM

## 2020-08-25 NOTE — ANESTHESIA PREPROCEDURE EVALUATION
"Anesthesia Pre-Procedure Evaluation    Patient: Ximena Diaz   MRN:     [id number] Gender:   female   Age:    19 year old :      2000        Preoperative Diagnosis: Right kidney stone [N20.0]   Procedure(s):  CYSTOSCOPY, RIGHT RETROGRADE PYELOGRAM, RIGHT URETEROSCOPY AND BASKET REMOVAL OF STONE, Balloon Dilaton,RIGHT URETERAL STENT PLACEMENT     LABS:  CBC:   Lab Results   Component Value Date    WBC 10.7 2020    WBC 14.5 (H) 2020    HGB 13.8 2020    HGB 12.3 2020    HCT 43.4 2020    HCT 38.8 2020     2020     2020     BMP:   Lab Results   Component Value Date     2020     2020    POTASSIUM 3.5 2020    POTASSIUM 3.9 2020    CHLORIDE 108 2020    CHLORIDE 110 (H) 2020    CO2 24 2020    CO2 24 2020    BUN 11 2020    BUN 12 2020    CR 0.82 2020    CR 0.84 2020     (H) 2020    GLC 90 2020     COAGS: No results found for: PTT, INR, FIBR  POC:   Lab Results   Component Value Date    HCG Negative 2020    HCGS Negative 2020     OTHER:   Lab Results   Component Value Date    LACT 1.1 2020    NIKI 8.8 2020    ALBUMIN 3.6 2020    PROTTOTAL 7.5 2020    ALT 36 2020    AST 16 2020    ALKPHOS 113 2020    BILITOTAL 0.2 2020    LIPASE 58 (L) 2020    TSH 1.79 02/15/2016    SED 9 02/15/2016        Preop Vitals    BP Readings from Last 3 Encounters:   20 116/58   20 112/68   20 132/78    Pulse Readings from Last 3 Encounters:   20 97   20 94   20 87      Resp Readings from Last 3 Encounters:   20 18   20 16   20 16    SpO2 Readings from Last 3 Encounters:   20 97%   20 99%   20 98%      Temp Readings from Last 1 Encounters:   20 35.9  C (96.7  F) (Temporal)    Ht Readings from Last 1 Encounters:   20 1.626 m (5' 4\") " "     Wt Readings from Last 1 Encounters:   08/20/20 110.2 kg (243 lb)    Estimated body mass index is 41.71 kg/m  as calculated from the following:    Height as of 8/20/20: 1.626 m (5' 4\").    Weight as of 8/20/20: 110.2 kg (243 lb).     LDA:  Peripheral IV 07/17/20 Left Lower forearm (Active)   Number of days: 39       Peripheral IV 08/25/20 Left Hand (Active)   Site Assessment WDL 08/25/20 1057   Line Status Infusing 08/25/20 1057   Phlebitis Scale 0-->no symptoms 08/25/20 1057   Infiltration Scale 0 08/25/20 1057   Infiltration Site Treatment Method  None 08/25/20 1057   Extravasation? No 08/25/20 1057   Dressing Intervention New dressing  08/25/20 1057   Number of days: 0       Urethral Catheter Latex 16 fr (Active)   Number of days: 0       Ureteral Drain/Stent Right ureter 6 fr (Active)   Number of days: 0        History reviewed. No pertinent past medical history.   Past Surgical History:   Procedure Laterality Date     ESOPHAGOSCOPY, GASTROSCOPY, DUODENOSCOPY (EGD), COMBINED N/A 2/17/2016    Procedure: COMBINED ESOPHAGOSCOPY, GASTROSCOPY, DUODENOSCOPY (EGD);  Surgeon: Arnav Schmid MD;  Location: [facility] OR     ESOPHAGOSCOPY, GASTROSCOPY, DUODENOSCOPY (EGD), COMBINED N/A 2/17/2016    Procedure: COMBINED ESOPHAGOSCOPY, GASTROSCOPY, DUODENOSCOPY (EGD), BIOPSY SINGLE OR MULTIPLE;  Surgeon: Arnav Schmid MD;  Location: [facility] OR     NO HISTORY OF SURGERY       TONSILLECTOMY, ADENOIDECTOMY, COMBINED Bilateral 3/25/2019    Procedure: BILATERAL  TONSILLECTOMY;  Surgeon: Giovanni Travis MD;  Location: [facility] OR      Allergies   Allergen Reactions     Nuts Anaphylaxis     No Clinical Screening - See Comments Rash     Nickel         Anesthesia Evaluation     .             ROS/MED HX    ENT/Pulmonary:  - neg pulmonary ROS     Neurologic:  - neg neurologic ROS     Cardiovascular:  - neg cardiovascular ROS       METS/Exercise Tolerance:     Hematologic:  - neg hematologic  ROS       Musculoskeletal:  - neg musculoskeletal ROS     "   GI/Hepatic:  - neg GI/hepatic ROS   (+) GERD       Renal/Genitourinary:  - ROS Renal section negative   (+) Nephrolithiasis ,       Endo:  - neg endo ROS   (+) Obesity, .      Psychiatric:  - neg psychiatric ROS       Infectious Disease:  - neg infectious disease ROS       Malignancy:      - no malignancy   Other:    - neg other ROS                     PHYSICAL EXAM:   Mental Status/Neuro: A/A/O   Airway: Facies: Feasible  Mallampati: II  Mouth/Opening: Full  TM distance: > 6 cm  Neck ROM: Full   Respiratory: Auscultation: CTAB     Resp. Rate: Normal     Resp. Effort: Normal      CV: Rhythm: Regular  Rate: Age appropriate  Heart: Normal Sounds  Edema: None   Comments:      Dental: Normal Dentition                Assessment:   ASA SCORE: 2    H&P: History and physical reviewed and following examination; no interval change.   Smoking Status:  Non-Smoker/Unknown   NPO Status: NPO Appropriate     Plan:   Anes. Type:  General   Pre-Medication: None   Induction:  IV (Standard)   Airway: LMA   Access/Monitoring: PIV   Maintenance: Balanced     Postop Plan:   Postop Pain: Opioids  Postop Sedation/Airway: Not planned  Disposition: Outpatient     PONV Management:   Adult Risk Factors: Female, Non-Smoker, Postop Opioids   Prevention: Ondansetron, Propofol     CONSENT: Direct conversation   Plan and risks discussed with: Patient   Blood Products: Consent Deferred (Minimal Blood Loss)                   Don Hazel MD

## 2020-08-25 NOTE — ANESTHESIA POSTPROCEDURE EVALUATION
Anesthesia POST Procedure Evaluation    Patient: Ximena Diaz   MRN:     [id number] Gender:   female   Age:    19 year old :      2000        Preoperative Diagnosis: Right kidney stone [N20.0]   Procedure(s):  CYSTOSCOPY, RIGHT RETROGRADE PYELOGRAM, RIGHT URETEROSCOPY AND BASKET REMOVAL OF STONE, Balloon Dilaton,RIGHT URETERAL STENT PLACEMENT   Postop Comments: No value filed.     Anesthesia Type: No value filed.          Postop Pain Control: Uneventful            Sign Out: Well controlled pain   PONV: No   Neuro/Psych: Uneventful            Sign Out: Acceptable/Baseline neuro status   Airway/Respiratory: Uneventful            Sign Out: Acceptable/Baseline resp. status   CV/Hemodynamics: Uneventful            Sign Out: Acceptable CV status   Other NRE: NONE   DID A NON-ROUTINE EVENT OCCUR? No         Last Anesthesia Record Vitals:  CRNA VITALS  2020 1242 - 2020 1330      2020             Pulse:  86    SpO2:  99 %    Resp Rate (observed):  (!) 3          Last PACU Vitals:  Vitals Value Taken Time   /58 2020  1:13 PM   Temp 35.9  C (96.7  F) 2020  1:13 PM   Pulse     Resp 18 2020  1:13 PM   SpO2 97 % 2020  1:13 PM   Temp src     NIBP     Pulse     SpO2     Resp     Temp     Ht Rate     Temp 2           Electronically Signed By: Don Hazel MD, 2020, 1:30 PM

## 2020-08-26 ENCOUNTER — TELEPHONE (OUTPATIENT)
Dept: UROLOGY | Facility: CLINIC | Age: 20
End: 2020-08-26

## 2020-08-26 NOTE — TELEPHONE ENCOUNTER
----- Message from Chago Fontanez MD sent at 8/25/2020  1:24 PM CDT -----  Regarding: Stent out next week  Hi team,    I'd like to see Negrita back next week for ureteral stent removal.    Thanks,   Chago Fontanez M.D.

## 2020-08-26 NOTE — TELEPHONE ENCOUNTER
Left generic voicemail for patient to return call to clinic. When call is returned, patient can be assisted in scheduling a cystoscopy for stent removal with Dr. Fontanez next week.    Verito Reed LPN

## 2020-08-27 ENCOUNTER — TELEPHONE (OUTPATIENT)
Dept: NURSING | Facility: CLINIC | Age: 20
End: 2020-08-27

## 2020-08-27 ENCOUNTER — TELEPHONE (OUTPATIENT)
Dept: UROLOGY | Facility: CLINIC | Age: 20
End: 2020-08-27

## 2020-08-27 NOTE — TELEPHONE ENCOUNTER
".  [facility]: Nurse Triage Note  SITUATION/BACKGROUND                                                      Ximena Diaz is a 19 year old female s/pCYSTOSCOPY, RIGHT RETROGRADE PYELOGRAM, RIGHT URETEROSCOPY AND BASKET REMOVAL OF STONE, Balloon [place],RIGHT URETERAL STENT PLACEMENT procedure on 8/25/20.   Patient reports having urine red in color and right constant flank pain.  Wants to know if normal? Has taken pain medication approximately 1.5 hrs and prescribed med ditropan. Pain is 10/10.   She stated that she has been drinking water, \"does not keep up with the amount\".  Fever? \" I don't think so\". Not very forthcoming with answering questions.   Placed on hold and contacted  Urology warm transfer given to Milagros (RN) for further assistance.       "

## 2020-08-27 NOTE — TELEPHONE ENCOUNTER
"Received call from patient who reports solid bloody stream of urine and severe right lower flank pain rated 10/10. Patient denies passing blood clots but does report pain with urination. Patient stated, \"It hurts so bad I can't even hardly move.\" patient reports taking flomax, oxybutynin, toradol, and took a dose of oxycodone 1 hour ago with no change in pain level. Per patient, she has been drinking a lot of water, denies vomiting, fevers, and chills. Per patient, she had an episode of nausea early morning on 8/26/20. Informed patient that a message will be sent to Dr. Fontanez with request to review and give recommendations. Patient aware that she will be contacted with additional recommendations.    Milagros Jade RN, BSN    "

## 2020-08-27 NOTE — TELEPHONE ENCOUNTER
Discussed note below with Dr. Fontanez. Per Dr. Fontanez, patient to continue taking the oxybutynin and  flomax daily, and the toradol as needed and oxycodone for breakthrough pain. Per Dr. Fontanez, when the toradol prescription is complete, patient can then take ibuprofen as needed. Dr. Fontanez recommends for patient to drink a lot of water, rest, and give it time. Per Dr. Fontanez, patient should call if she develops a fever and seek care at an ER if pain is severe and uncontrolled at home or if patient is concerned.    Called and spoke to patient who is aware of the above information. Patient verbalized understanding and was comfortable with plan. Informed patient to call with any questions or concerns.    Milagros Jade RN, BSN

## 2020-08-28 LAB
APPEARANCE STONE: NORMAL
COMPN STONE: NORMAL
NUMBER STONE: 1
SIZE STONE: NORMAL MM
WT STONE: NORMAL MG

## 2020-09-01 ENCOUNTER — OFFICE VISIT (OUTPATIENT)
Dept: UROLOGY | Facility: CLINIC | Age: 20
End: 2020-09-01
Payer: COMMERCIAL

## 2020-09-01 ENCOUNTER — TELEPHONE (OUTPATIENT)
Dept: UROLOGY | Facility: CLINIC | Age: 20
End: 2020-09-01

## 2020-09-01 DIAGNOSIS — Z46.6 ENCOUNTER FOR REMOVAL OF URETERAL STENT: Primary | ICD-10-CM

## 2020-09-01 DIAGNOSIS — R10.9 FLANK PAIN: Primary | ICD-10-CM

## 2020-09-01 PROCEDURE — 52310 CYSTOSCOPY AND TREATMENT: CPT | Performed by: UROLOGY

## 2020-09-01 RX ORDER — OXYCODONE HYDROCHLORIDE 5 MG/1
5 TABLET ORAL EVERY 6 HOURS PRN
Qty: 5 TABLET | Refills: 0 | Status: SHIPPED | OUTPATIENT
Start: 2020-09-01 | End: 2020-09-23

## 2020-09-01 RX ORDER — KETOROLAC TROMETHAMINE 10 MG/1
10 TABLET, FILM COATED ORAL EVERY 6 HOURS
Qty: 20 TABLET | Refills: 0 | Status: SHIPPED | OUTPATIENT
Start: 2020-09-01 | End: 2020-09-06

## 2020-09-01 RX ORDER — TAMSULOSIN HYDROCHLORIDE 0.4 MG/1
0.4 CAPSULE ORAL DAILY
Qty: 5 CAPSULE | Refills: 0 | Status: SHIPPED | OUTPATIENT
Start: 2020-09-01 | End: 2020-09-06

## 2020-09-01 RX ORDER — CIPROFLOXACIN 500 MG/1
500 TABLET, FILM COATED ORAL ONCE
Status: COMPLETED | OUTPATIENT
Start: 2020-09-01 | End: 2020-09-01

## 2020-09-01 RX ADMIN — CIPROFLOXACIN 500 MG: 500 TABLET, FILM COATED ORAL at 08:31

## 2020-09-01 NOTE — PATIENT INSTRUCTIONS
"After Your Cystoscopy    What happens after the exam?    You may go back to your normal diet and activity as you feel ready, unless your doctor tells you not to.    For the next two days, you may notice:    Some blood in your urine  Some burning when you urinate (use the toilet)  An urge to urinate more often  Bladder spasms    These are normal after the procedure and should go away after a day or two.  To relieve these problems drink 6 to 8 large glasses of water each day (includes drinks at meals) as this will help clear the urine.  Take warm baths to relieve pain and bladder spasms.  Do not add anything to the bath water.  You may also take Tylenol (acetaminophen) for pain if needed.    When should I call my doctor?    A fever over 100F (38C) for more than a day. (Before you call the doctor, check your temperature under your tongue)  Chills  Failure to urinate: No urine comes out when you try to use the toilet. (Try soaking in a bathtub full of warm water. If still no urine, call your doctor)  A lot of blood in the urine, or blood clots larger than a nickel  Pain in the back or belly area (abdomen)  Pain or spasms that are not relieved by warm tub baths and pain medicine  Severe pain, burning or other problems while passing urine  Pain that gets worse after two days            AFTER YOUR CYSTOSCOPY        You have just completed a cystoscopy, or \"cysto\", which allowed your physician to learn more about your bladder (or to remove a stent placed after surgery). We suggest that you continue to avoid caffeine, fruit juice, and alcohol for the next 24 hours, however, you are encouraged to return to your normal activities.         A few things that are considered normal after your cystoscopy:     * Small amount of bleeding (or spotting) that clears within the next 24 hours     * Slight burning sensation with urination     * Sensation to of needing to avoid more frequently     * The feeling of \"air\" in your urine     * " Mild discomfort that is relieved with Tylenol        Please contact our office promptly if you:     * Develop a fever above 101 degrees     * Are unable to urinate     * Develop bright red blood that does not stop     * Severe pain or swelling         Please contact our office with any concerns or questions @[facility].

## 2020-09-01 NOTE — TELEPHONE ENCOUNTER
Received the note below per Dr. Fontanez.    Chago Fontanez MD  You; [facility] Urology Adult [place] 1 minute ago (12:35 PM)       I am sorry to hear she is having so much pain and vomiting.  I sent a prescription for 5 more days of tamsulosin, Toradol, and 5 pills of oxycodone to her pharmacy.  If her pain and nausea get worse and are not able to be managed at home, she may require emergency room evaluation though this should be a last resort.     Chago Fontanez M.D.          Relayed above note to patient who verbalized understanding.    Verito Reed LPN

## 2020-09-01 NOTE — TELEPHONE ENCOUNTER
Called patient and relayed message below per Dr. Fontanez. Patient states she took tylenol and has tried a heating pad. Patient states pain is a 10/10 and is so bad it is causing her to vomit. Informed her a messge would be sent to Dr. Fontanez. Patient was agreeable to this plan.    Verito Reed LPN

## 2020-09-01 NOTE — TELEPHONE ENCOUNTER
Per Dr. Fontanez, patient is having uretal spasms. Completely normal after a cystoscopy stent removal. Patient can alternate tylenol and ibuprofen, try soaking in a warm bath.    Will call patient to discuss.    Verito Reed LPN

## 2020-09-01 NOTE — NURSING NOTE
Ximena Diaz's goals for this visit include:   Chief Complaint   Patient presents with     Cystoscopy     stent removal       She requests these members of her care team be copied on today's visit information:     PCP: Curtis Holley    Referring Provider:  No referring provider defined for this encounter.    [facility] 08/07/2020     Do you need any medication refills at today's visit? No    Verito Reed LPN

## 2020-09-01 NOTE — PROGRESS NOTES
CYSTOSCOPY AND URETERAL STENT REMOVAL PROCEDURE NOTE:    Ximena Diaz is a 19 year old female who presents with ureteral stent for a cystoscopy and ureteral stent removal.    Pt ID verified with patient: Yes     Procedure verified with patient: Yes     Procedure confirmed with physician and support staff: Yes     Consent confirmed with physician and support staff.    Sign In:  History and Physical Exam reviewed  Primary Diagnosis: ureteral stent   Informed Consent Discussed: Yes   Sign in Communication: Yes   Time Out:  Team Confirms the Correct Patient, Correct Procedure; Yes , Correct Site and Site Marking, Correct Position (if applicable).  Affirmation of Time Out: Yes   Sign Out:  Sign Out Discussion: Yes     Ximena Diaz is a 19 year old female with an indwelling ureteral stent in need of removal.    CYSTOSCOPY PROCEDURE:  After sterile preparation and draping of the patient,  a 17-Qatari flexible cystoscope was introduced via the urethra.  It was passed without difficulty into the bladder.  The urethra was open without evidence of stricture.  The ureteral orifices were orthotopic.  The double J stent was seen coming out the right side.  It was grasped with an alligator forceps and extracted intact without difficulty.  The patient tolerated the procedure well.    STONE ANALYSIS:  Calculi composed primarily of:   80% calcium oxalate monohydrate,   10% calcium oxalate dihydrate, and   10% calcium phosphate (hydroxy- and carbonate- apatite).     A/P Successful stent removal  Prophylactic antibiotic ordered   Stone prevention counseling provided today  - Follow up PRN    Watch for any new onset fevers, signs of UTI.  May expect some pain after removal.  If this is severe, or last many hours, you may need to return for replacement of stent.    Chago Fontanez MD   Urology  [facility]

## 2020-09-01 NOTE — TELEPHONE ENCOUNTER
[facility] Call Center    Phone Message    May a detailed message be left on voicemail: yes     Reason for Call: Other: Patient had a procedure this morning with Dr. Fontanez and has some concerns.  She is experiencing a lot pain and nausea/vomiting. She would like a call back to discuss.     Action Taken: Message routed to:  Adult Clinics: Urology p [id number]    Travel Screening: Not Applicable

## 2020-09-23 ENCOUNTER — VIRTUAL VISIT (OUTPATIENT)
Dept: FAMILY MEDICINE | Facility: CLINIC | Age: 20
End: 2020-09-23
Payer: COMMERCIAL

## 2020-09-23 DIAGNOSIS — F41.9 ANXIETY: Primary | ICD-10-CM

## 2020-09-23 PROCEDURE — 99213 OFFICE O/P EST LOW 20 MIN: CPT | Mod: GT | Performed by: PHYSICIAN ASSISTANT

## 2020-09-23 RX ORDER — SERTRALINE HYDROCHLORIDE 25 MG/1
25 TABLET, FILM COATED ORAL DAILY
Qty: 30 TABLET | Refills: 1 | Status: SHIPPED | OUTPATIENT
Start: 2020-09-23 | End: 2020-11-09

## 2020-09-23 ASSESSMENT — ANXIETY QUESTIONNAIRES
3. WORRYING TOO MUCH ABOUT DIFFERENT THINGS: NEARLY EVERY DAY
5. BEING SO RESTLESS THAT IT IS HARD TO SIT STILL: NEARLY EVERY DAY
IF YOU CHECKED OFF ANY PROBLEMS ON THIS QUESTIONNAIRE, HOW DIFFICULT HAVE THESE PROBLEMS MADE IT FOR YOU TO DO YOUR WORK, TAKE CARE OF THINGS AT HOME, OR GET ALONG WITH OTHER PEOPLE: EXTREMELY DIFFICULT
1. FEELING NERVOUS, ANXIOUS, OR ON EDGE: NEARLY EVERY DAY
7. FEELING AFRAID AS IF SOMETHING AWFUL MIGHT HAPPEN: NEARLY EVERY DAY
GAD7 TOTAL SCORE: 21
6. BECOMING EASILY ANNOYED OR IRRITABLE: NEARLY EVERY DAY
2. NOT BEING ABLE TO STOP OR CONTROL WORRYING: NEARLY EVERY DAY

## 2020-09-23 ASSESSMENT — PATIENT HEALTH QUESTIONNAIRE - PHQ9
5. POOR APPETITE OR OVEREATING: NEARLY EVERY DAY
SUM OF ALL RESPONSES TO PHQ QUESTIONS 1-9: 14

## 2020-09-23 NOTE — PROGRESS NOTES
"Ximena Diaz is a 19 year old female who is being evaluated via a billable telephone visit.      The patient has been notified of following:     \"This telephone visit will be conducted via a call between you and your physician/provider. We have found that certain health care needs can be provided without the need for a physical exam.  This service lets us provide the care you need with a short phone conversation.  If a prescription is necessary we can send it directly to your pharmacy.  If lab work is needed we can place an order for that and you can then stop by our lab to have the test done at a later time.    Telephone visits are billed at different rates depending on your insurance coverage. During this emergency period, for some insurers they may be billed the same as an in-person visit.  Please reach out to your insurance provider with any questions.    If during the course of the call the physician/provider feels a telephone visit is not appropriate, you will not be charged for this service.\"    Patient has given verbal consent for Telephone visit?  Yes    What phone number would you like to be contacted at? [phone]    How would you like to obtain your AVS? Alyssa Baptiste     Ximena Diaz is a 19 year old female who presents via phone visit today for the following health issues:    HPI    Abnormal Mood Symptoms  Onset/Duration: ongoing   Description:   Depression (if yes, do PHQ-9): no  Anxiety (if yes, do GEORGINA-7): YES  Accompanying Signs & Symptoms:  Still participating in activities that you used to enjoy: YES  Fatigue: no  Irritability: no  Difficulty concentrating: YES  Changes in appetite: YES  Problems with sleep: YES  Heart racing/beating fast: YES  Abnormally elevated, expansive, or irritable mood: YES  Persistently increased activity or energy: no  Thoughts of hurting yourself or others: no  History:  Recent stress or major life event: YES- kicked out of a class - increase stress " with test taking. 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  She states this is been going on her whole high school career.  Prior depression or anxiety: yes  Family history of depression or anxiety: YES- mother   Alcohol/drug use: YES- alcohol  Difficulty sleeping: YES  Precipitating or alleviating factors: None  Therapies tried and outcome: none  PHQ 9/23/2020   PHQ-9 Total Score 14   Q9: Thoughts of better off dead/self-harm past 2 weeks Not at all     GEORGINA-7 SCORE 9/23/2020   Total Score 21     Review of Systems   Constitutional, HEENT, cardiovascular, pulmonary, gi and gu systems are negative, except as otherwise noted.       Objective          Vitals:  No vitals were obtained today due to virtual visit.    healthy, alert and no distress  PSYCH: Alert and oriented times 3; coherent speech, normal   rate and volume, able to articulate logical thoughts, able   to abstract reason, no tangential thoughts, no hallucinations   or delusions  Her affect is normal  RESP: No cough, no audible wheezing, able to talk in full sentences  Remainder of exam unable to be completed due to telephone visits            Assessment & Plan       ICD-10-CM    1. Anxiety  F41.9 sertraline (ZOLOFT) 25 MG tablet     MENTAL HEALTH REFERRAL  - Adult; Outpatient Treatment; Individual/Couples/Family/Group Therapy/Health Psychology; [facility] [place], [place]: [facility] [phone]; We will contact you to schedule the appointment or please call with any questions       Talk to patient on the phone regarding her concerns appear we will get a started on Zoloft 25 mg a day we will recheck things in 4 weeks.  Encourage her to get in for counseling to maybe work on stress management and test anxiety.      Return in about 4 weeks (around 10/21/2020) for recheck.    Curtis Holley PA-C  [facility]    Phone call duration:  5 minutes

## 2020-09-24 ASSESSMENT — ANXIETY QUESTIONNAIRES: GAD7 TOTAL SCORE: 21

## 2020-11-07 DIAGNOSIS — F41.9 ANXIETY: ICD-10-CM

## 2020-11-07 NOTE — LETTER
November 9, 2020    Ximena Diaz  [address] [facility]  [place] [address]    Dear Ximena,       We recently received a refill request for sertraline (ZOLOFT) 25 MG tablet.  We have refilled this for a one time 30 day supply only because you are due for a:    Anxiety office visit-this can be a virtual visit.      Please call at your earliest convenience so that there will not be a delay with your future refills.          Thank you,   Your [facility] Team/  [phone]

## 2020-11-09 RX ORDER — SERTRALINE HYDROCHLORIDE 25 MG/1
TABLET, FILM COATED ORAL
Qty: 30 TABLET | Refills: 0 | Status: SHIPPED | OUTPATIENT
Start: 2020-11-09 | End: 2020-12-15

## 2020-11-22 ENCOUNTER — HEALTH MAINTENANCE LETTER (OUTPATIENT)
Age: 20
End: 2020-11-22

## 2020-12-08 ENCOUNTER — MYC MEDICAL ADVICE (OUTPATIENT)
Dept: UROLOGY | Facility: CLINIC | Age: 20
End: 2020-12-08

## 2020-12-09 NOTE — TELEPHONE ENCOUNTER
Left generic voicemail for patient to return call to clinic.When call is returned, will discuss symptoms. Also sent mychart to patient.    Verito Reed LPN

## 2020-12-11 DIAGNOSIS — F41.9 ANXIETY: ICD-10-CM

## 2020-12-11 NOTE — TELEPHONE ENCOUNTER
Chago Fontanez MD Berkenes, Melissa, RN; P [facility] Urology Adult [place]   Caller: Unspecified (3 days ago, 11:57 PM)   Phone Number: [phone]             I do not think this pain is related to her left kidney. Her CT scan from August did not have any stones in the LEFT kidney so it is very unlikely she developed a new stone this quickly. I would recommend she first follow up with her PCP.     Thanks,   Chago Fontanez M.D.     Received the above message from Dr. Fontanez. My chart message sent to patient.    Milagros Jade RN, BSN

## 2020-12-14 NOTE — TELEPHONE ENCOUNTER
My chart message reviewed by patient on 12/14/20. Will close encounter at this time.    Milagros Jade RN, BSN

## 2020-12-14 NOTE — TELEPHONE ENCOUNTER
Routing refill request to provider for review/approval because:  Christelle given x1 and patient did not follow up, please advise  PATIENT NO SHOWED APPOINTMENT TODAY    Wendy Vasquez BSN, RN

## 2020-12-15 RX ORDER — SERTRALINE HYDROCHLORIDE 25 MG/1
TABLET, FILM COATED ORAL
Qty: 30 TABLET | Refills: 0 | Status: SHIPPED | OUTPATIENT
Start: 2020-12-15 | End: 2021-01-20

## 2021-01-17 DIAGNOSIS — F41.9 ANXIETY: ICD-10-CM

## 2021-01-19 RX ORDER — SERTRALINE HYDROCHLORIDE 25 MG/1
TABLET, FILM COATED ORAL
Qty: 30 TABLET | Refills: 0 | OUTPATIENT
Start: 2021-01-19

## 2021-01-19 NOTE — TELEPHONE ENCOUNTER
Routing refill request to provider for review/approval because:  Christelle given x 2 and patient did not follow up, please advise    CHAVA MaddenN, RN

## 2021-01-20 ENCOUNTER — VIRTUAL VISIT (OUTPATIENT)
Dept: FAMILY MEDICINE | Facility: CLINIC | Age: 21
End: 2021-01-20
Payer: COMMERCIAL

## 2021-01-20 DIAGNOSIS — F41.9 ANXIETY: ICD-10-CM

## 2021-01-20 PROCEDURE — 99213 OFFICE O/P EST LOW 20 MIN: CPT | Mod: TEL | Performed by: PHYSICIAN ASSISTANT

## 2021-01-20 PROCEDURE — 96127 BRIEF EMOTIONAL/BEHAV ASSMT: CPT | Performed by: PHYSICIAN ASSISTANT

## 2021-01-20 RX ORDER — SERTRALINE HYDROCHLORIDE 25 MG/1
25 TABLET, FILM COATED ORAL DAILY
Qty: 90 TABLET | Refills: 1 | Status: SHIPPED | OUTPATIENT
Start: 2021-01-20 | End: 2021-08-24

## 2021-01-20 ASSESSMENT — ANXIETY QUESTIONNAIRES
3. WORRYING TOO MUCH ABOUT DIFFERENT THINGS: NOT AT ALL
GAD7 TOTAL SCORE: 0
2. NOT BEING ABLE TO STOP OR CONTROL WORRYING: NOT AT ALL
1. FEELING NERVOUS, ANXIOUS, OR ON EDGE: NOT AT ALL
IF YOU CHECKED OFF ANY PROBLEMS ON THIS QUESTIONNAIRE, HOW DIFFICULT HAVE THESE PROBLEMS MADE IT FOR YOU TO DO YOUR WORK, TAKE CARE OF THINGS AT HOME, OR GET ALONG WITH OTHER PEOPLE: NOT DIFFICULT AT ALL
5. BEING SO RESTLESS THAT IT IS HARD TO SIT STILL: NOT AT ALL
6. BECOMING EASILY ANNOYED OR IRRITABLE: NOT AT ALL
7. FEELING AFRAID AS IF SOMETHING AWFUL MIGHT HAPPEN: NOT AT ALL

## 2021-01-20 ASSESSMENT — PATIENT HEALTH QUESTIONNAIRE - PHQ9
SUM OF ALL RESPONSES TO PHQ QUESTIONS 1-9: 1
5. POOR APPETITE OR OVEREATING: NOT AT ALL

## 2021-01-20 NOTE — PROGRESS NOTES
Negrita is a 20 year old who is being evaluated via a billable telephone visit.      What phone number would you like to be contacted at? [phone]  How would you like to obtain your AVS? Alyssa Rees is a 20 year old who presents to clinic today for the following health issues     HPI       Anxiety Follow-Up    How are you doing with your anxiety since your last visit? Improved     Are you having other symptoms that might be associated with anxiety? No    Have you had a significant life event? No     Are you feeling depressed? No    Do you have any concerns with your use of alcohol or other drugs? No    Social History     Tobacco Use     Smoking status: Never Smoker     Smokeless tobacco: Never Used   Substance Use Topics     Alcohol use: Yes     Comment: on weekends     Drug use: No     GEORGINA-7 SCORE 9/23/2020 1/20/2021   Total Score 21 0     PHQ 9/23/2020 1/20/2021   PHQ-9 Total Score 14 1   Q9: Thoughts of better off dead/self-harm past 2 weeks Not at all Not at all     Last PHQ-9 1/20/2021   1.  Little interest or pleasure in doing things 0   2.  Feeling down, depressed, or hopeless 0   3.  Trouble falling or staying asleep, or sleeping too much 0   4.  Feeling tired or having little energy 1   5.  Poor appetite or overeating 0   6.  Feeling bad about yourself 0   7.  Trouble concentrating 0   8.  Moving slowly or restless 0   Q9: Thoughts of better off dead/self-harm past 2 weeks 0   PHQ-9 Total Score 1   Difficulty at work, home, or with people Not difficult at all     GEORGINA-7  1/20/2021   1. Feeling nervous, anxious, or on edge 0   2. Not being able to stop or control worrying 0   3. Worrying too much about different things 0   4. Trouble relaxing 0   5. Being so restless that it is hard to sit still 0   6. Becoming easily annoyed or irritable 0   7. Feeling afraid, as if something awful might happen 0   GEORGINA-7 Total Score 0   If you checked any problems, how difficult have they made it for  you to do your work, take care of things at home, or get along with other people? Not difficult at all     100% better. Is able to focus more and getting things done.         Review of Systems   Constitutional, HEENT, cardiovascular, pulmonary, gi and gu systems are negative, except as otherwise noted.      Objective           Vitals:  No vitals were obtained today due to virtual visit.    Physical Exam   healthy, alert and no distress  PSYCH: Alert and oriented times 3; coherent speech, normal   rate and volume, able to articulate logical thoughts, able   to abstract reason, no tangential thoughts, no hallucinations   or delusions  Her affect is normal  RESP: No cough, no audible wheezing, able to talk in full sentences  Remainder of exam unable to be completed due to telephone visits        ICD-10-CM    1. Anxiety  F41.9 sertraline (ZOLOFT) 25 MG tablet     Patient states she is doing well up.  Medications were refilled.  We will recheck things in 6 months or sooner if needed.        Phone call duration: 5 minutes

## 2021-01-21 ASSESSMENT — ANXIETY QUESTIONNAIRES: GAD7 TOTAL SCORE: 0

## 2021-02-25 NOTE — PROGRESS NOTES
"SUBJECTIVE:   Ximena Diaz is a 20 year old female seen here today for her bilateral Ankle  What happened: no injury       Onset: 1 month     Description:   Character: sharp pains    Intensity: severe    Progression of Symptoms: worse    Accompanying Signs & Symptoms:  Other symptoms: radiation of pain to calf/knee. Warm feeling. Redness    History:   Previous similar pain: no       Precipitating factors:   Trauma or overuse: YES- possibly     Alleviating factors:  Improved by: nothing       Therapies Tried and outcome: new shoes, has to sit and rest which eventually makes them feel better.  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       PFSH:  Past Medical, social, family histories, medications, and allergies were reviewed and updated.     OBJECTIVE:  /83   Pulse 98   Temp 97.5  F (36.4  C) (Tympanic)   Resp 18   Ht 1.626 m (5' 4\")   Wt 118.8 kg (262 lb)   SpO2 97%   BMI 44.97 kg/m    General:  Ximena is awake, alert, and cooperative.  NAD.  Examination of bilateral lower extremities her calves are soft nontender she neuro vas intact distally.  She has full range of motion of bilateral ankles mild discomfort with plantar flexion.  She describes a pain more on the Achilles tendons bilaterally left greater than right.  No deformity noted.  She has 5-5 strength in all motions.  Upon looking on the heels of her ankle she has varus deformity with going up on her tiptoes.  Mildly  flatter arches bilaterally.    ASSESSMENT:     ICD-10-CM    1. Bilateral ankle pain, unspecified chronicity  M25.571 Orthopedic & Spine  Referral    M25.572        PLAN:   ice or cold packs 20 minutes every 2-3 hrs as needed to relieve pain and swelling, for the first 2 days. Then can apply heat 20 minutes every 2-3 hrs (avoid sleeping on heating pad) there after as needed.   If you can tolerate, start non-steroidal anti-inflammatory medication like: Ibuprofen 600-800 mg three " times daily or Aleve 2 tablets of over the counter strength twice a day as needed.   Tylenol can help with pain also.    Active range of motion exercises encouraged  Activity modification trying to avoid activities that cause you pain.   Follow up 1 wk with podiatry.    Curtis Holley PA-C

## 2021-02-26 ENCOUNTER — OFFICE VISIT (OUTPATIENT)
Dept: FAMILY MEDICINE | Facility: CLINIC | Age: 21
End: 2021-02-26
Payer: COMMERCIAL

## 2021-02-26 VITALS
HEART RATE: 98 BPM | OXYGEN SATURATION: 97 % | RESPIRATION RATE: 18 BRPM | BODY MASS INDEX: 44.73 KG/M2 | HEIGHT: 64 IN | DIASTOLIC BLOOD PRESSURE: 83 MMHG | SYSTOLIC BLOOD PRESSURE: 130 MMHG | TEMPERATURE: 97.5 F | WEIGHT: 262 LBS

## 2021-02-26 DIAGNOSIS — M25.571 BILATERAL ANKLE PAIN, UNSPECIFIED CHRONICITY: Primary | ICD-10-CM

## 2021-02-26 DIAGNOSIS — M25.572 BILATERAL ANKLE PAIN, UNSPECIFIED CHRONICITY: Primary | ICD-10-CM

## 2021-02-26 PROCEDURE — 99213 OFFICE O/P EST LOW 20 MIN: CPT | Performed by: PHYSICIAN ASSISTANT

## 2021-02-26 ASSESSMENT — MIFFLIN-ST. JEOR: SCORE: 1933.42

## 2021-02-26 NOTE — PATIENT INSTRUCTIONS
ice or cold packs 20 minutes every 2-3 hrs as needed to relieve pain and swelling, for the first 2 days. Then can apply heat 20 minutes every 2-3 hrs (avoid sleeping on heating pad) there after as needed.   If you can tolerate, start non-steroidal anti-inflammatory medication like: Ibuprofen 600-800 mg three times daily or Aleve 2 tablets of over the counter strength twice a day as needed.   Tylenol can help with pain also.    Active range of motion exercises encouraged  Activity modification trying to avoid activities that cause you pain.

## 2021-03-05 ENCOUNTER — OFFICE VISIT (OUTPATIENT)
Dept: PODIATRY | Facility: CLINIC | Age: 21
End: 2021-03-05
Attending: PHYSICIAN ASSISTANT
Payer: COMMERCIAL

## 2021-03-05 VITALS — HEART RATE: 77 BPM | DIASTOLIC BLOOD PRESSURE: 70 MMHG | SYSTOLIC BLOOD PRESSURE: 112 MMHG

## 2021-03-05 DIAGNOSIS — M76.70 PERONEAL TENDINITIS, UNSPECIFIED LATERALITY: ICD-10-CM

## 2021-03-05 DIAGNOSIS — M21.6X9 CAVUS FOOT, ACQUIRED: Primary | ICD-10-CM

## 2021-03-05 PROBLEM — J30.9 ALLERGIC RHINITIS: Status: ACTIVE | Noted: 2021-03-05

## 2021-03-05 PROBLEM — L25.9 CONTACT DERMATITIS AND ECZEMA: Status: ACTIVE | Noted: 2021-03-05

## 2021-03-05 PROCEDURE — 99203 OFFICE O/P NEW LOW 30 MIN: CPT | Performed by: PODIATRIST

## 2021-03-05 NOTE — LETTER
3/5/2021         RE: Ximena Diaz  [address] [facility]  [place] [address]        Dear Colleague,    Thank you for referring your patient, Ximena Diaz, to the [facility]. Please see a copy of my visit note below.    Subjective:    Pt is seen today in consult from Curtis Franks with the c/c of painful feet left>R.  This has been symptomatic for the past 1 months.  Pt denies any hx of trauma.  Aggravated by activity and releaved by rest.  Describes as burning pain.  Is slowly getting worse.  Feels better wearing good shoes.  Point to peroneal tendon course lateral calcaneus as to wear it hurts.  Denies erythema, ecchymosis, snapping, weakness.   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  She walks on a treadmill at an incline for exercise.    ROS:  A 10-point review of systems was performed and is positive for that noted in the HPI and as seen above.  All other areas are negative.         Allergies   Allergen Reactions     Nuts Anaphylaxis     No Clinical Screening - See Comments Rash     Nickel        Current Outpatient Medications   Medication Sig Dispense Refill     etonogestrel (IMPLANON/NEXPLANON) 68 MG IMPL 1 each (68 mg) by Subdermal route continuous  0     fexofenadine (ALLEGRA) 180 MG tablet Take 1 tablet (180 mg) by mouth daily 30 tablet 1     ibuprofen (ADVIL/MOTRIN) 200 MG tablet Take 200 mg by mouth every 4 hours as needed for mild pain       sertraline (ZOLOFT) 25 MG tablet Take 1 tablet (25 mg) by mouth daily 90 tablet 1       Patient Active Problem List   Diagnosis     Esophageal reflux     Nut allergy     Abdominal pain, left upper quadrant     Non-intractable vomiting with nausea, vomiting of unspecified type     Nexplanon in place     Acute recurrent streptococcal tonsillitis     Flank pain     Pyelonephritis     Right kidney stone       No past medical history on file.    Past Surgical History:   Procedure Laterality Date     COMBINED CYSTOSCOPY,  URETEROSCOPY, LASER HOLMIUM LITHOTRIPSY URETER(S) Right 8/25/2020    Procedure: CYSTOSCOPY, RIGHT RETROGRADE PYELOGRAM, RIGHT URETEROSCOPY AND BASKET REMOVAL OF STONE, Balloon Dilaton,RIGHT URETERAL STENT PLACEMENT;  Surgeon: Chago Fontanez MD;  Location: [facility] OR     ESOPHAGOSCOPY, GASTROSCOPY, DUODENOSCOPY (EGD), COMBINED N/A 2/17/2016    Procedure: COMBINED ESOPHAGOSCOPY, GASTROSCOPY, DUODENOSCOPY (EGD);  Surgeon: Arnav Schmid MD;  Location: [facility] OR     ESOPHAGOSCOPY, GASTROSCOPY, DUODENOSCOPY (EGD), COMBINED N/A 2/17/2016    Procedure: COMBINED ESOPHAGOSCOPY, GASTROSCOPY, DUODENOSCOPY (EGD), BIOPSY SINGLE OR MULTIPLE;  Surgeon: Arnav Schmid MD;  Location: [facility] OR     NO HISTORY OF SURGERY       TONSILLECTOMY, ADENOIDECTOMY, COMBINED Bilateral 3/25/2019    Procedure: BILATERAL  TONSILLECTOMY;  Surgeon: Giovanni Travis MD;  Location: [facility] OR       No family history on file.    Social History     Tobacco Use     Smoking status: Never Smoker     Smokeless tobacco: Never Used   Substance Use Topics     Alcohol use: Yes     Comment: on weekends             O:  /70   Pulse 77 .     Constitutional/ general:  Pt is in no apparent distress, appears well-nourished.  Cooperative with history and physical exam.     Psych:  The patient answered questions appropriately.  Normal affect.  Seems to have reasonable expectations, in terms of treatment.     Eyes:  Visual scanning/ tracking without deficit.     Ears:  Response to auditory stimuli is normal.  No  hearing aid devices.  Auricles in proper alignment.     Lymphatic:  Popliteal lymph nodes not enlarged.     Lungs:  Non labored breathing, non labored speech. No cough.  No audible wheezing. Even, quiet breathing.       Vascular:  Pedal pulses are palpable bilaterally for both the DP and PT arteries.  CFT < 3 sec.  No edema.  Pedal hair growth noted.     Neuro:  Alert and oriented x 3. Coordinated gait.  Light touch sensation is intact to the L4, L5, S1 distributions. No  obvious deficits.  No evidence of neurological-based weakness, spasticity, or contracture in the lower extremities.     Derm: Normal texture and turgor.  No erythema, ecchymosis, or cyanosis.  No open lesions.     Musculoskeletal:    Lower extremity muscle strength is normal.  Patient is ambulatory without an assistive device or brace.     Cavus foot with weightbearing bilateral.  No forefoot or rear foot deformities noted.  MS 5/5 all compartments.  Normal ROM all fore foot and rearfoot joints with no pain or crepitus.  No equinus.  Pain bilateral over peroneal tendon course lateral calcaneus and lateral malleoli left>R.  No subluxation.  No edema.  No masses.  No ecchymosis.  No pain with stressing peroneal is longus.  Pain with stressing peroneus brevis bilaterally left greater than right    Radiographic Exam:  X-Ray Findings:  I personally reviewed the films, normal    A:  Cavus foot with peroneal tendonitis    Plan:   Discussed etiology and treatment options in detail w/ the pt.  Ice bid.  Explained how cavus foot causes more pressure on the sinus.  Discussed importance of feeling these quickly especially the left prevent tear in tendon.  She will stop doing the treadmill.  Ice twice daily.  Ibuprofen to decrease inflammation.  For her left foot which is the worst we gave her a cam walker.   Will dispense cam walker today.  Patient to wear this at all times while walking.  When not walking patient will take this off and do ROM to prevent blood clot and joint stiffness.  Patient will not sleep with this on.  For her right foot we gave her a felt pad made to sherrill heel.  Warned of tendon tear if noncompliant.   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  Discussed orthotics to help sherrill her heel but she declines at this time.  Thank you for allowing me participate in the care of this patient.        Geovani Bazan, MARIAH DPM, FACFAS           Again, thank you for allowing me to participate in the  care of your patient.        Sincerely,        Geovani Bazan DPM

## 2021-03-05 NOTE — PATIENT INSTRUCTIONS
We wish you continued good healing. If you have any questions or concerns, please do not hesitate to contact us at [phone]    [facility] [employer] (secure e-mail communication and access to your chart) to send a message or to make an appointment.    Please remember to call and schedule a follow up appointment if one was recommended at your earliest convenience.     +++OF MARCH 2020+++ LOCATION AND HOURS HAVE CHANGED    PLEASE CALL CLINICS TO VERIFY DAYS AND TIMES  PODIATRY CLINIC HOURS  TELEPHONE NUMBER    Dr. Geovani CARTERPDARIEN [facility]        Clinics:  [facility]   Tuesday 1PM-6PM  [place]  Wednesday 745AM-330PM  [place]  Thursday/Friday 745AM-230PM  [facility]UDILL[facility]/[place] APPOINTMENTS  [phone]    [place] APPOINTMENTS  [phone]          If you need a medication refill, please contact us you may need lab work and/or a follow up visit prior to your refill (i.e. Antifungal medications).    If MRI needed please call Imaging at [phone] or [phone]    HOW DO I GET MY KNEE SCOOTER? Knee scooters can be picked up at ANY Medical Supply stores with your knee scooter Prescription.  OR    Bring your signed prescription to an [facility] showroom.

## 2021-03-05 NOTE — PROGRESS NOTES
Subjective:    Pt is seen today in consult from Curtis Franks with the c/c of painful feet left>R.  This has been symptomatic for the past 1 months.  Pt denies any hx of trauma.  Aggravated by activity and releaved by rest.  Describes as burning pain.  Is slowly getting worse.  Feels better wearing good shoes.  Point to peroneal tendon course lateral calcaneus as to wear it hurts.  Denies erythema, ecchymosis, snapping, weakness.   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  She walks on a treadmill at an incline for exercise.    ROS:  A 10-point review of systems was performed and is positive for that noted in the HPI and as seen above.  All other areas are negative.         Allergies   Allergen Reactions     Nuts Anaphylaxis     No Clinical Screening - See Comments Rash     Nickel        Current Outpatient Medications   Medication Sig Dispense Refill     etonogestrel (IMPLANON/NEXPLANON) 68 MG IMPL 1 each (68 mg) by Subdermal route continuous  0     fexofenadine (ALLEGRA) 180 MG tablet Take 1 tablet (180 mg) by mouth daily 30 tablet 1     ibuprofen (ADVIL/MOTRIN) 200 MG tablet Take 200 mg by mouth every 4 hours as needed for mild pain       sertraline (ZOLOFT) 25 MG tablet Take 1 tablet (25 mg) by mouth daily 90 tablet 1       Patient Active Problem List   Diagnosis     Esophageal reflux     Nut allergy     Abdominal pain, left upper quadrant     Non-intractable vomiting with nausea, vomiting of unspecified type     Nexplanon in place     Acute recurrent streptococcal tonsillitis     Flank pain     Pyelonephritis     Right kidney stone       No past medical history on file.    Past Surgical History:   Procedure Laterality Date     COMBINED CYSTOSCOPY, URETEROSCOPY, LASER HOLMIUM LITHOTRIPSY URETER(S) Right 8/25/2020    Procedure: CYSTOSCOPY, RIGHT RETROGRADE PYELOGRAM, RIGHT URETEROSCOPY AND BASKET REMOVAL OF STONE, Balloon Dilaton,RIGHT URETERAL STENT PLACEMENT;  Surgeon: Chago Fontanez MD;  Location:  OR      ESOPHAGOSCOPY, GASTROSCOPY, DUODENOSCOPY (EGD), COMBINED N/A 2/17/2016    Procedure: COMBINED ESOPHAGOSCOPY, GASTROSCOPY, DUODENOSCOPY (EGD);  Surgeon: Arnav Schmid MD;  Location: [facility]     ESOPHAGOSCOPY, GASTROSCOPY, DUODENOSCOPY (EGD), COMBINED N/A 2/17/2016    Procedure: COMBINED ESOPHAGOSCOPY, GASTROSCOPY, DUODENOSCOPY (EGD), BIOPSY SINGLE OR MULTIPLE;  Surgeon: Anrav Schmid MD;  Location: [facility] OR     NO HISTORY OF SURGERY       TONSILLECTOMY, ADENOIDECTOMY, COMBINED Bilateral 3/25/2019    Procedure: BILATERAL  TONSILLECTOMY;  Surgeon: Giovanni Travis MD;  Location: [facility] OR       No family history on file.    Social History     Tobacco Use     Smoking status: Never Smoker     Smokeless tobacco: Never Used   Substance Use Topics     Alcohol use: Yes     Comment: on weekends             O:  /70   Pulse 77 .     Constitutional/ general:  Pt is in no apparent distress, appears well-nourished.  Cooperative with history and physical exam.     Psych:  The patient answered questions appropriately.  Normal affect.  Seems to have reasonable expectations, in terms of treatment.     Eyes:  Visual scanning/ tracking without deficit.     Ears:  Response to auditory stimuli is normal.  No  hearing aid devices.  Auricles in proper alignment.     Lymphatic:  Popliteal lymph nodes not enlarged.     Lungs:  Non labored breathing, non labored speech. No cough.  No audible wheezing. Even, quiet breathing.       Vascular:  Pedal pulses are palpable bilaterally for both the DP and PT arteries.  CFT < 3 sec.  No edema.  Pedal hair growth noted.     Neuro:  Alert and oriented x 3. Coordinated gait.  Light touch sensation is intact to the L4, L5, S1 distributions. No obvious deficits.  No evidence of neurological-based weakness, spasticity, or contracture in the lower extremities.     Derm: Normal texture and turgor.  No erythema, ecchymosis, or cyanosis.  No open lesions.     Musculoskeletal:    Lower extremity muscle  strength is normal.  Patient is ambulatory without an assistive device or brace.     Cavus foot with weightbearing bilateral.  No forefoot or rear foot deformities noted.  MS 5/5 all compartments.  Normal ROM all fore foot and rearfoot joints with no pain or crepitus.  No equinus.  Pain bilateral over peroneal tendon course lateral calcaneus and lateral malleoli left>R.  No subluxation.  No edema.  No masses.  No ecchymosis.  No pain with stressing peroneal is longus.  Pain with stressing peroneus brevis bilaterally left greater than right    Radiographic Exam:  X-Ray Findings:  I personally reviewed the films, normal    A:  Cavus foot with peroneal tendonitis    Plan:   Discussed etiology and treatment options in detail w/ the pt.  Ice bid.  Explained how cavus foot causes more pressure on the sinus.  Discussed importance of feeling these quickly especially the left prevent tear in tendon.  She will stop doing the treadmill.  Ice twice daily.  Ibuprofen to decrease inflammation.  For her left foot which is the worst we gave her a cam walker.   Will dispense cam walker today.  Patient to wear this at all times while walking.  When not walking patient will take this off and do ROM to prevent blood clot and joint stiffness.  Patient will not sleep with this on.  For her right foot we gave her a felt pad made to sherrill heel.  Warned of tendon tear if noncompliant.   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  Discussed orthotics to help sherrill her heel but she declines at this time.  Thank you for allowing me participate in the care of this patient.        Geovani Bazan, MARIAH DPM, FACFAS

## 2021-03-08 ENCOUNTER — TELEPHONE (OUTPATIENT)
Dept: PODIATRY | Facility: CLINIC | Age: 21
End: 2021-03-08

## 2021-03-08 NOTE — TELEPHONE ENCOUNTER
LMTC(left mess to call)   No work restrictions mentioned. Only limited Treadmill work-out    Caity Casanova CMA

## 2021-03-08 NOTE — TELEPHONE ENCOUNTER
Patient calling wondering if you wanted her to be on any restrictions for work. Please call her at [phone].  Carolynn Cronin,

## 2021-03-09 ENCOUNTER — NURSE TRIAGE (OUTPATIENT)
Dept: NURSING | Facility: CLINIC | Age: 21
End: 2021-03-09

## 2021-03-09 NOTE — TELEPHONE ENCOUNTER
FNA  call : recent Hx of Dx with Bilateral ankle Tendonitis on  3/5/21 by Podatrist Dr Bazan   Presenting problem : Pt awoke , Left leg and foot hurts and worse with standing .    Pt appeared to hang up at this point and  FNA called back and Pt answer and  hung up . FNA call back and call went to voicemail , and FNA  left a message for Pt to call back if she wants to speak to Nurse.     Caller verbalizes understanding and denies further questions and will call back if further symptoms to triage or questions  . Kenya Reina RN  - [facility] Nurse Advisor

## 2021-04-14 ENCOUNTER — HOSPITAL ENCOUNTER (EMERGENCY)
Facility: CLINIC | Age: 21
Discharge: HOME | End: 2021-04-14
Admitting: EMERGENCY MEDICINE
Payer: COMMERCIAL

## 2021-04-14 VITALS
OXYGEN SATURATION: 98 % | WEIGHT: 260.14 LBS | DIASTOLIC BLOOD PRESSURE: 79 MMHG | HEART RATE: 95 BPM | TEMPERATURE: 98 F | HEIGHT: 63 IN | RESPIRATION RATE: 16 BRPM | SYSTOLIC BLOOD PRESSURE: 130 MMHG | BODY MASS INDEX: 46.09 KG/M2

## 2021-04-14 LAB
ALBUMIN UR-MCNC: 20 MG/DL
ANION GAP SERPL CALCULATED.3IONS-SCNC: 5 MMOL/L (ref 3–14)
APPEARANCE UR: CLEAR
BACTERIA #/AREA URNS HPF: ABNORMAL /HPF
BASOPHILS # BLD AUTO: 0.1 10E9/L (ref 0–0.2)
BASOPHILS NFR BLD AUTO: 0.5 %
BILIRUB UR QL STRIP: NEGATIVE
BUN SERPL-MCNC: 9 MG/DL (ref 7–30)
CALCIUM SERPL-MCNC: 9.4 MG/DL (ref 8.5–10.1)
CHLORIDE SERPL-SCNC: 107 MMOL/L (ref 94–109)
CO2 SERPL-SCNC: 26 MMOL/L (ref 20–32)
COLOR UR AUTO: ABNORMAL
CREAT SERPL-MCNC: 0.81 MG/DL (ref 0.52–1.04)
DIFFERENTIAL METHOD BLD: ABNORMAL
EOSINOPHIL # BLD AUTO: 0.1 10E9/L (ref 0–0.7)
EOSINOPHIL NFR BLD AUTO: 0.6 %
ERYTHROCYTE [DISTWIDTH] IN BLOOD BY AUTOMATED COUNT: 13 % (ref 10–15)
GFR SERPL CREATININE-BSD FRML MDRD: >90 ML/MIN/{1.73_M2}
GLUCOSE SERPL-MCNC: 80 MG/DL (ref 70–99)
GLUCOSE UR STRIP-MCNC: NEGATIVE MG/DL
HCG UR QL: NEGATIVE
HCT VFR BLD AUTO: 45.5 % (ref 35–47)
HGB BLD-MCNC: 14.6 G/DL (ref 11.7–15.7)
HGB UR QL STRIP: ABNORMAL
IMM GRANULOCYTES # BLD: 0.1 10E9/L (ref 0–0.4)
IMM GRANULOCYTES NFR BLD: 0.6 %
KETONES UR STRIP-MCNC: NEGATIVE MG/DL
LEUKOCYTE ESTERASE UR QL STRIP: ABNORMAL
LYMPHOCYTES # BLD AUTO: 3.1 10E9/L (ref 0.8–5.3)
LYMPHOCYTES NFR BLD AUTO: 23.9 %
MCH RBC QN AUTO: 27.3 PG (ref 26.5–33)
MCHC RBC AUTO-ENTMCNC: 32.1 G/DL (ref 31.5–36.5)
MCV RBC AUTO: 85 FL (ref 78–100)
MONOCYTES # BLD AUTO: 0.6 10E9/L (ref 0–1.3)
MONOCYTES NFR BLD AUTO: 4.9 %
NEUTROPHILS # BLD AUTO: 8.9 10E9/L (ref 1.6–8.3)
NEUTROPHILS NFR BLD AUTO: 69.5 %
NITRATE UR QL: NEGATIVE
NRBC # BLD AUTO: 0 10*3/UL
NRBC BLD AUTO-RTO: 0 /100
PH UR STRIP: 6 PH (ref 5–7)
PLATELET # BLD AUTO: 376 10E9/L (ref 150–450)
POTASSIUM SERPL-SCNC: 3.8 MMOL/L (ref 3.4–5.3)
RBC # BLD AUTO: 5.34 10E12/L (ref 3.8–5.2)
RBC #/AREA URNS AUTO: 7 /HPF (ref 0–2)
SODIUM SERPL-SCNC: 138 MMOL/L (ref 133–144)
SOURCE: ABNORMAL
SP GR UR STRIP: 1.02 (ref 1–1.03)
SQUAMOUS #/AREA URNS AUTO: 4 /HPF (ref 0–1)
UROBILINOGEN UR STRIP-MCNC: NORMAL MG/DL (ref 0–2)
WBC # BLD AUTO: 12.8 10E9/L (ref 4–11)
WBC #/AREA URNS AUTO: 7 /HPF (ref 0–5)

## 2021-04-14 PROCEDURE — 85025 COMPLETE CBC W/AUTO DIFF WBC: CPT | Performed by: EMERGENCY MEDICINE

## 2021-04-14 PROCEDURE — 81025 URINE PREGNANCY TEST: CPT | Performed by: EMERGENCY MEDICINE

## 2021-04-14 PROCEDURE — 999N000104 HC STATISTIC NO CHARGE

## 2021-04-14 PROCEDURE — 81001 URINALYSIS AUTO W/SCOPE: CPT | Performed by: EMERGENCY MEDICINE

## 2021-04-14 PROCEDURE — 80048 BASIC METABOLIC PNL TOTAL CA: CPT | Performed by: EMERGENCY MEDICINE

## 2021-04-14 ASSESSMENT — MIFFLIN-ST. JEOR: SCORE: 1909.13

## 2021-04-14 NOTE — ED TRIAGE NOTES
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

## 2021-04-14 NOTE — ED PROVIDER NOTES
ED Provider Note  [facility]      History     Chief Complaint   Patient presents with     Flank Pain     The history is provided by the patient and medical records.     Ximena Diaz is a 20 year old female with a past medical history significant for a previous kidney stone who presents to the ED for evaluation of flank pain.***    {Past History:775685}      Review of Systems  A complete review of systems was performed with pertinent positives and negatives noted in the HPI, and all other systems negative.    Physical Exam      Physical Exam  ***  ED Course      Procedures        {ED Course Selections:282960}       No results found for any visits on 04/14/21.  Medications - No data to display     Assessments & Plan (with Medical Decision Making)   ***    I have reviewed the nursing notes. I have reviewed the findings, diagnosis, plan and need for follow up with the patient.    New Prescriptions    No medications on file       Final diagnoses:   None       --  ***  [facility] EMERGENCY DEPARTMENT  4/14/2021

## 2021-04-20 ENCOUNTER — APPOINTMENT (OUTPATIENT)
Dept: URGENT CARE | Facility: CLINIC | Age: 21
End: 2021-04-20
Payer: COMMERCIAL

## 2021-06-24 ENCOUNTER — TELEPHONE (OUTPATIENT)
Dept: UROLOGY | Facility: CLINIC | Age: 21
End: 2021-06-24

## 2021-06-24 NOTE — TELEPHONE ENCOUNTER
Health Call Center    Phone Message    May a detailed message be left on voicemail: yes     Reason for Call: Other: PT Negrita called to make transition of care from Dr. Fontanez to Dr. Flannery. Per PT had previously had surgery for extensive kidney stone removal with Dr. Fontanez, and condition is recurrent now. Per PT she is in the midst of painful kidney stone event, and does not want to go back to Dr. Fontanez. 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 Per protocol referring to clinic for review and scheduling. Pls call PT to schedule.     Action Taken: Message routed to:  [facility]    Travel Screening: Not Applicable

## 2021-06-25 ENCOUNTER — OFFICE VISIT (OUTPATIENT)
Dept: URGENT CARE | Facility: URGENT CARE | Age: 21
End: 2021-06-25
Payer: COMMERCIAL

## 2021-06-25 VITALS
SYSTOLIC BLOOD PRESSURE: 106 MMHG | DIASTOLIC BLOOD PRESSURE: 72 MMHG | OXYGEN SATURATION: 99 % | HEART RATE: 91 BPM | TEMPERATURE: 98.6 F

## 2021-06-25 DIAGNOSIS — R30.0 DYSURIA: ICD-10-CM

## 2021-06-25 DIAGNOSIS — R31.9 HEMATURIA, UNSPECIFIED TYPE: Primary | ICD-10-CM

## 2021-06-25 LAB
ALBUMIN UR-MCNC: NEGATIVE MG/DL
APPEARANCE UR: ABNORMAL
BACTERIA #/AREA URNS HPF: ABNORMAL /HPF
BILIRUB UR QL STRIP: NEGATIVE
COLOR UR AUTO: YELLOW
GLUCOSE UR STRIP-MCNC: NEGATIVE MG/DL
HGB UR QL STRIP: ABNORMAL
KETONES UR STRIP-MCNC: NEGATIVE MG/DL
LEUKOCYTE ESTERASE UR QL STRIP: NEGATIVE
NITRATE UR QL: NEGATIVE
NON-SQ EPI CELLS #/AREA URNS LPF: ABNORMAL /LPF
PH UR STRIP: 7 PH (ref 5–7)
RBC #/AREA URNS AUTO: ABNORMAL /HPF
SOURCE: ABNORMAL
SP GR UR STRIP: 1.02 (ref 1–1.03)
TRANS CELLS #/AREA URNS HPF: ABNORMAL /HPF
UROBILINOGEN UR STRIP-ACNC: 0.2 EU/DL (ref 0.2–1)
WBC #/AREA URNS AUTO: ABNORMAL /HPF

## 2021-06-25 PROCEDURE — 87086 URINE CULTURE/COLONY COUNT: CPT | Performed by: PHYSICIAN ASSISTANT

## 2021-06-25 PROCEDURE — 81001 URINALYSIS AUTO W/SCOPE: CPT | Performed by: PHYSICIAN ASSISTANT

## 2021-06-25 PROCEDURE — 99213 OFFICE O/P EST LOW 20 MIN: CPT | Performed by: PHYSICIAN ASSISTANT

## 2021-06-25 ASSESSMENT — ENCOUNTER SYMPTOMS
FREQUENCY: 1
BACK PAIN: 1
FEVER: 0

## 2021-06-25 NOTE — PATIENT INSTRUCTIONS
Patient Education     Blood in the Urine    Blood in the urine (hematuria) has many possible causes. If it occurs after an injury (such as a car accident or fall), it is most often a sign of bruising to the kidney or bladder. Common causes of blood in the urine include urinary tract infections, kidney stones, inflammation, tumors, or certain other diseases of the kidney or bladder. Menstruation can cause blood to appear in the urine sample, but it is not coming from the urinary tract.   If only a tiny (trace) amount of blood is present, it will show up on the urine test, even though the urine may be yellow and not pink or red. This may occur with any of the above conditions, as well as heavy exercise or high fever. In this case, your healthcare provider may want to repeat the urine test on another day. This will show if there is still blood in the urine. If there is, then other tests can be done to find out the cause.   Home care  Follow these home care guidelines:    If your urine does not look bloody (pink, brown, or red) then you don't need to restrict your activity in any way.    If you can see blood in your urine, rest and don't do any strenuous activity until your next exam. Don't use aspirin, blood thinners, or anti-platelet or anti-inflammatory medicines. These include ibuprofen and naproxen. These thin the blood and may increase bleeding. Call your healthcare provider to talk about using these medicines.  Follow-up care  Follow up with your healthcare provider, or as advised. If you were injured and had blood in your urine, you should have a repeat urine test in 1 to 2 days. Contact your provider for this test.   A radiologist will review any X-rays that were taken. You will be told of any new findings that may affect your care.   When to seek medical advice  Call your healthcare provider right away if any of these occur:    Bright red blood or blood clots in the urine (if you did not have this  before)    Weakness, dizziness or fainting    New groin, belly, or back pain    Fever of 100.4 F (38 C) or higher, or as directed by your provider    Repeated vomiting    Bleeding from the nose or gums or easy bruising  [employer] last reviewed this educational content on 9/1/2019 2000-2021 [employer]. All rights reserved. This information is not intended as a substitute for professional medical care. Always follow your healthcare professional's instructions.

## 2021-06-25 NOTE — LETTER
[facility] [place]  [address] [facility] [address]  Phone: [phone]    June 25, 2021        Ximena E Diaz  [address] [facility] [address]          To whom it may concern:    RE: Ximena Diaz    Patient was seen and treated today at our clinic and missed work.    Please contact me for questions or concerns.      Sincerely,        Nikkie Ayala

## 2021-06-25 NOTE — PROGRESS NOTES
SUBJECTIVE:   Ximena Diaz is a 20 year old female presenting with a chief complaint of   Chief Complaint   Patient presents with     Kidney Problem     hx of kidney stones has been to urology       She is an established patient of [facility].   Patient presents with complaints of back pain - right side, but both hurt.  Sent here by urology for UA and r/o UTI.  Hx of kidney stones.  Stents were placed.  No lithotripsy.  Needs referral for urology.  Last nephrolithiasis was 8/20.      Treatment:  Increased fluid intake.  Advil and/or tylenol.      PMHx:  Kidney stones  Medications:  Certraline  Surgery:  Urology  Social:  Smoke and etoh - social  Allergies:  Nuts      Review of Systems   Constitutional: Negative for fever.   Genitourinary: Positive for frequency.   Musculoskeletal: Positive for back pain.   All other systems reviewed and are negative.      History reviewed. No pertinent past medical history.  History reviewed. No pertinent family history.  Current Outpatient Medications   Medication Sig Dispense Refill     etonogestrel (IMPLANON/NEXPLANON) 68 MG IMPL 1 each (68 mg) by Subdermal route continuous  0     fexofenadine (ALLEGRA) 180 MG tablet Take 1 tablet (180 mg) by mouth daily 30 tablet 1     ibuprofen (ADVIL/MOTRIN) 200 MG tablet Take 200 mg by mouth every 4 hours as needed for mild pain       sertraline (ZOLOFT) 25 MG tablet Take 1 tablet (25 mg) by mouth daily 90 tablet 1     Social History     Tobacco Use     Smoking status: Never Smoker     Smokeless tobacco: Never Used   Substance Use Topics     Alcohol use: Yes     Comment: on weekends       OBJECTIVE  /72   Pulse 91   Temp 98.6  F (37  C) (Tympanic)   SpO2 99%     Physical Exam  Vitals signs and nursing note reviewed.   Constitutional:       Appearance: Normal appearance. She is obese.   Eyes:      Extraocular Movements: Extraocular movements intact.      Conjunctiva/sclera: Conjunctivae normal.   Cardiovascular:      Rate and Rhythm:  Normal rate and regular rhythm.      Pulses: Normal pulses.      Heart sounds: Normal heart sounds.   Pulmonary:      Effort: Pulmonary effort is normal.      Breath sounds: Normal breath sounds.   Abdominal:      Tenderness: There is right CVA tenderness. There is no left CVA tenderness.   Skin:     General: Skin is warm and dry.   Neurological:      General: No focal deficit present.      Mental Status: She is alert.   Psychiatric:         Mood and Affect: Mood normal.         Behavior: Behavior normal.         Labs:  Results for orders placed or performed in visit on 06/25/21 (from the past 24 hour(s))   *UA reflex to Microscopic and Culture ([facility] (except [place] and [place])    Specimen: Midstream Urine   Result Value Ref Range    Color Urine Yellow     Appearance Urine Slightly Cloudy     Glucose Urine Negative NEG^Negative mg/dL    Bilirubin Urine Negative NEG^Negative    Ketones Urine Negative NEG^Negative mg/dL    Specific Gravity Urine 1.020 1.003 - 1.035    Blood Urine Trace (A) NEG^Negative    pH Urine 7.0 5.0 - 7.0 pH    Protein Albumin Urine Negative NEG^Negative mg/dL    Urobilinogen Urine 0.2 0.2 - 1.0 EU/dL    Nitrite Urine Negative NEG^Negative    Leukocyte Esterase Urine Negative NEG^Negative    Source Midstream Urine    Urine Microscopic   Result Value Ref Range    WBC Urine 0 - 5 OTO5^0 - 5 /HPF    RBC Urine O - 2 OTO2^O - 2 /HPF    Squamous Epithelial /LPF Urine Moderate (A) FEW^Few /LPF    Transitional Epi Few FEW^Few /HPF    Bacteria Urine Few (A) NEG^Negative /HPF       X-Ray was not done.    ASSESSMENT:      ICD-10-CM    1. Hematuria, unspecified type  R31.9 Urine Culture Aerobic Bacterial     Adult Urology Referral   2. Dysuria  R30.0 *UA reflex to Microscopic and Culture ([facility] (except [place] and [place])     Urine Microscopic        Medical Decision Making:    Differential Diagnosis:  Kidney stones.      Serious Comorbid  Conditions:  Adult:  as above    PLAN:  Urine culture pending.  Referral to urology.  Increase water intake.  Discussed reasons to seek immediate medical attention.    Note for work.      Followup:    If not improving or if condition worsens, follow up with your Primary Care Provider, In 2  day(s) follow up with  urology    Patient Instructions     Patient Education     Blood in the Urine    Blood in the urine (hematuria) has many possible causes. If it occurs after an injury (such as a car accident or fall), it is most often a sign of bruising to the kidney or bladder. Common causes of blood in the urine include urinary tract infections, kidney stones, inflammation, tumors, or certain other diseases of the kidney or bladder. Menstruation can cause blood to appear in the urine sample, but it is not coming from the urinary tract.   If only a tiny (trace) amount of blood is present, it will show up on the urine test, even though the urine may be yellow and not pink or red. This may occur with any of the above conditions, as well as heavy exercise or high fever. In this case, your healthcare provider may want to repeat the urine test on another day. This will show if there is still blood in the urine. If there is, then other tests can be done to find out the cause.   Home care  Follow these home care guidelines:    If your urine does not look bloody (pink, brown, or red) then you don't need to restrict your activity in any way.    If you can see blood in your urine, rest and don't do any strenuous activity until your next exam. Don't use aspirin, blood thinners, or anti-platelet or anti-inflammatory medicines. These include ibuprofen and naproxen. These thin the blood and may increase bleeding. Call your healthcare provider to talk about using these medicines.  Follow-up care  Follow up with your healthcare provider, or as advised. If you were injured and had blood in your urine, you should have a repeat urine test in  1 to 2 days. Contact your provider for this test.   A radiologist will review any X-rays that were taken. You will be told of any new findings that may affect your care.   When to seek medical advice  Call your healthcare provider right away if any of these occur:    Bright red blood or blood clots in the urine (if you did not have this before)    Weakness, dizziness or fainting    New groin, belly, or back pain    Fever of 100.4 F (38 C) or higher, or as directed by your provider    Repeated vomiting    Bleeding from the nose or gums or easy bruising  [place] last reviewed this educational content on 9/1/2019 2000-2021 [employer]. All rights reserved. This information is not intended as a substitute for professional medical care. Always follow your healthcare professional's instructions.

## 2021-06-25 NOTE — TELEPHONE ENCOUNTER
Spoke with patient she is using OTC and heat for comfort, states she is urinating frequently, encouraged her to seek out care today for her pain with primary care, Dr Fontanez, or ED as needed, ask for referral to Dr Ramirez or Pancho GERARDO.   Will inform Dr Ramirez's nurse of patients condition.  Vaishnavi Campos RN

## 2021-06-26 LAB
BACTERIA SPEC CULT: NORMAL
Lab: NORMAL
SPECIMEN SOURCE: NORMAL

## 2021-07-07 ENCOUNTER — TELEPHONE (OUTPATIENT)
Dept: UROLOGY | Facility: CLINIC | Age: 21
End: 2021-07-07

## 2021-07-07 NOTE — TELEPHONE ENCOUNTER
[facility] Call Center    Phone Message    May a detailed message be left on voicemail: yes     Reason for Call: Appointment Intake    Referring Provider Name: Nikkie Ayala  Diagnosis and/or Symptoms: Hematuria, unspecified type     Action Taken: Message routed to:  Adult Clinics: Urology p [id number]    Travel Screening: Not Applicable

## 2021-07-07 NOTE — TELEPHONE ENCOUNTER
Usually we would not refer hematuria to dr fraire unless it relates to his diagnosis's such as neurogenic bladder or urethral stricture.  Do you see anything like that present in her chart.  I have not scheduled any time with yee but thought I would have you take a look as well Archana Huertas LPN Staff Nurse

## 2021-07-09 NOTE — TELEPHONE ENCOUNTER
Please schedule patient with Dr Amato for kidney stone work up not Dr Flannery. Left a message with this information for patient. Thank you, BASSEM Vigil

## 2021-07-12 NOTE — TELEPHONE ENCOUNTER
I left a detailed VM that Dr Amato our stone Dr is booked out till Oct and if patient wants to schedule to call. I left the clinic number.

## 2021-08-16 ENCOUNTER — HOSPITAL ENCOUNTER (EMERGENCY)
Facility: CLINIC | Age: 21
Discharge: HOME | End: 2021-08-17
Attending: EMERGENCY MEDICINE | Admitting: EMERGENCY MEDICINE
Payer: COMMERCIAL

## 2021-08-16 DIAGNOSIS — R10.9 FLANK PAIN: ICD-10-CM

## 2021-08-16 DIAGNOSIS — D72.829 LEUKOCYTOSIS (LEUCOCYTOSIS): ICD-10-CM

## 2021-08-16 DIAGNOSIS — N10 PYELONEPHRITIS, ACUTE: ICD-10-CM

## 2021-08-16 DIAGNOSIS — N20.0 NEPHROLITHIASIS: ICD-10-CM

## 2021-08-16 LAB
ALBUMIN UR-MCNC: 50 MG/DL
ANION GAP SERPL CALCULATED.3IONS-SCNC: 5 MMOL/L (ref 3–14)
APPEARANCE UR: ABNORMAL
BACTERIA #/AREA URNS HPF: ABNORMAL /HPF
BASOPHILS # BLD AUTO: 0.1 10E3/UL (ref 0–0.2)
BASOPHILS NFR BLD AUTO: 1 %
BILIRUB UR QL STRIP: NEGATIVE
BUN SERPL-MCNC: 10 MG/DL (ref 7–30)
CALCIUM SERPL-MCNC: 9.5 MG/DL (ref 8.5–10.1)
CHLORIDE BLD-SCNC: 109 MMOL/L (ref 94–109)
CO2 SERPL-SCNC: 24 MMOL/L (ref 20–32)
COLOR UR AUTO: ABNORMAL
CREAT SERPL-MCNC: 0.9 MG/DL (ref 0.52–1.04)
EOSINOPHIL # BLD AUTO: 0.1 10E3/UL (ref 0–0.7)
EOSINOPHIL NFR BLD AUTO: 1 %
ERYTHROCYTE [DISTWIDTH] IN BLOOD BY AUTOMATED COUNT: 13 % (ref 10–15)
GFR SERPL CREATININE-BSD FRML MDRD: >90 ML/MIN/1.73M2
GLUCOSE BLD-MCNC: 85 MG/DL (ref 70–99)
GLUCOSE UR STRIP-MCNC: NEGATIVE MG/DL
HCT VFR BLD AUTO: 47.4 % (ref 35–47)
HGB BLD-MCNC: 15.4 G/DL (ref 11.7–15.7)
HGB UR QL STRIP: ABNORMAL
IMM GRANULOCYTES # BLD: 0.1 10E3/UL
IMM GRANULOCYTES NFR BLD: 1 %
KETONES UR STRIP-MCNC: NEGATIVE MG/DL
LEUKOCYTE ESTERASE UR QL STRIP: ABNORMAL
LYMPHOCYTES # BLD AUTO: 2.9 10E3/UL (ref 0.8–5.3)
LYMPHOCYTES NFR BLD AUTO: 20 %
MCH RBC QN AUTO: 27.8 PG (ref 26.5–33)
MCHC RBC AUTO-ENTMCNC: 32.5 G/DL (ref 31.5–36.5)
MCV RBC AUTO: 86 FL (ref 78–100)
MONOCYTES # BLD AUTO: 0.9 10E3/UL (ref 0–1.3)
MONOCYTES NFR BLD AUTO: 6 %
MUCOUS THREADS #/AREA URNS LPF: PRESENT /LPF
NEUTROPHILS # BLD AUTO: 10.7 10E3/UL (ref 1.6–8.3)
NEUTROPHILS NFR BLD AUTO: 71 %
NITRATE UR QL: NEGATIVE
NRBC # BLD AUTO: 0 10E3/UL
NRBC BLD AUTO-RTO: 0 /100
PH UR STRIP: 6.5 [PH] (ref 5–7)
PLATELET # BLD AUTO: 414 10E3/UL (ref 150–450)
POTASSIUM BLD-SCNC: 3.7 MMOL/L (ref 3.4–5.3)
RBC # BLD AUTO: 5.53 10E6/UL (ref 3.8–5.2)
RBC URINE: 28 /HPF
SODIUM SERPL-SCNC: 138 MMOL/L (ref 133–144)
SP GR UR STRIP: 1.01 (ref 1–1.03)
SQUAMOUS EPITHELIAL: <1 /HPF
TRANSITIONAL EPI: <1 /HPF
UROBILINOGEN UR STRIP-MCNC: NORMAL MG/DL
WBC # BLD AUTO: 14.8 10E3/UL (ref 4–11)
WBC URINE: 121 /HPF

## 2021-08-16 PROCEDURE — 87086 URINE CULTURE/COLONY COUNT: CPT | Performed by: EMERGENCY MEDICINE

## 2021-08-16 PROCEDURE — 99284 EMERGENCY DEPT VISIT MOD MDM: CPT | Mod: 25

## 2021-08-16 PROCEDURE — 250N000011 HC RX IP 250 OP 636: Performed by: EMERGENCY MEDICINE

## 2021-08-16 PROCEDURE — 81001 URINALYSIS AUTO W/SCOPE: CPT | Performed by: EMERGENCY MEDICINE

## 2021-08-16 PROCEDURE — 85025 COMPLETE CBC W/AUTO DIFF WBC: CPT | Performed by: EMERGENCY MEDICINE

## 2021-08-16 PROCEDURE — 81025 URINE PREGNANCY TEST: CPT | Performed by: EMERGENCY MEDICINE

## 2021-08-16 PROCEDURE — 96361 HYDRATE IV INFUSION ADD-ON: CPT

## 2021-08-16 PROCEDURE — 80048 BASIC METABOLIC PNL TOTAL CA: CPT | Performed by: EMERGENCY MEDICINE

## 2021-08-16 PROCEDURE — 96375 TX/PRO/DX INJ NEW DRUG ADDON: CPT

## 2021-08-16 PROCEDURE — 258N000003 HC RX IP 258 OP 636: Performed by: EMERGENCY MEDICINE

## 2021-08-16 PROCEDURE — 36415 COLL VENOUS BLD VENIPUNCTURE: CPT | Performed by: EMERGENCY MEDICINE

## 2021-08-16 PROCEDURE — 99284 EMERGENCY DEPT VISIT MOD MDM: CPT | Performed by: EMERGENCY MEDICINE

## 2021-08-16 RX ORDER — ONDANSETRON 2 MG/ML
4 INJECTION INTRAMUSCULAR; INTRAVENOUS ONCE
Status: COMPLETED | OUTPATIENT
Start: 2021-08-16 | End: 2021-08-16

## 2021-08-16 RX ADMIN — SODIUM CHLORIDE 1000 ML: 9 INJECTION, SOLUTION INTRAVENOUS at 21:51

## 2021-08-16 RX ADMIN — ONDANSETRON 4 MG: 2 INJECTION INTRAMUSCULAR; INTRAVENOUS at 21:51

## 2021-08-16 ASSESSMENT — MIFFLIN-ST. JEOR: SCORE: 1919.13

## 2021-08-17 ENCOUNTER — APPOINTMENT (OUTPATIENT)
Dept: CT IMAGING | Facility: CLINIC | Age: 21
End: 2021-08-17
Attending: EMERGENCY MEDICINE
Payer: COMMERCIAL

## 2021-08-17 VITALS
DIASTOLIC BLOOD PRESSURE: 91 MMHG | SYSTOLIC BLOOD PRESSURE: 110 MMHG | RESPIRATION RATE: 16 BRPM | OXYGEN SATURATION: 97 % | HEART RATE: 88 BPM | WEIGHT: 262.35 LBS | BODY MASS INDEX: 46.48 KG/M2 | TEMPERATURE: 98.8 F | HEIGHT: 63 IN

## 2021-08-17 LAB
HCG UR QL: NEGATIVE
HCO3 BLDV-SCNC: 25 MMOL/L (ref 21–28)
LACTATE BLD-SCNC: 0.6 MMOL/L
PCO2 BLDV: 45 MM HG (ref 40–50)
PH BLDV: 7.35 [PH] (ref 7.32–7.43)
PO2 BLDV: 26 MM HG (ref 25–47)
SAO2 % BLDV: 45 % (ref 94–100)

## 2021-08-17 PROCEDURE — 96366 THER/PROPH/DIAG IV INF ADDON: CPT

## 2021-08-17 PROCEDURE — 82803 BLOOD GASES ANY COMBINATION: CPT

## 2021-08-17 PROCEDURE — 36415 COLL VENOUS BLD VENIPUNCTURE: CPT | Performed by: EMERGENCY MEDICINE

## 2021-08-17 PROCEDURE — 96365 THER/PROPH/DIAG IV INF INIT: CPT

## 2021-08-17 PROCEDURE — 74176 CT ABD & PELVIS W/O CONTRAST: CPT

## 2021-08-17 PROCEDURE — 96375 TX/PRO/DX INJ NEW DRUG ADDON: CPT

## 2021-08-17 PROCEDURE — 87040 BLOOD CULTURE FOR BACTERIA: CPT | Performed by: EMERGENCY MEDICINE

## 2021-08-17 PROCEDURE — 250N000011 HC RX IP 250 OP 636: Performed by: EMERGENCY MEDICINE

## 2021-08-17 PROCEDURE — 74176 CT ABD & PELVIS W/O CONTRAST: CPT | Mod: 26 | Performed by: RADIOLOGY

## 2021-08-17 PROCEDURE — 258N000003 HC RX IP 258 OP 636: Performed by: EMERGENCY MEDICINE

## 2021-08-17 RX ORDER — KETOROLAC TROMETHAMINE 15 MG/ML
15 INJECTION, SOLUTION INTRAMUSCULAR; INTRAVENOUS ONCE
Status: COMPLETED | OUTPATIENT
Start: 2021-08-17 | End: 2021-08-17

## 2021-08-17 RX ORDER — CIPROFLOXACIN 500 MG/1
500 TABLET, FILM COATED ORAL 2 TIMES DAILY
Qty: 12 TABLET | Refills: 0 | Status: SHIPPED | OUTPATIENT
Start: 2021-08-17 | End: 2021-08-23

## 2021-08-17 RX ORDER — CEFTRIAXONE 1 G/1
1 INJECTION, POWDER, FOR SOLUTION INTRAMUSCULAR; INTRAVENOUS ONCE
Status: COMPLETED | OUTPATIENT
Start: 2021-08-17 | End: 2021-08-17

## 2021-08-17 RX ORDER — SODIUM CHLORIDE 9 MG/ML
INJECTION, SOLUTION INTRAVENOUS CONTINUOUS
Status: DISCONTINUED | OUTPATIENT
Start: 2021-08-17 | End: 2021-08-17 | Stop reason: HOSPADM

## 2021-08-17 RX ADMIN — SODIUM CHLORIDE 1000 ML: 9 INJECTION, SOLUTION INTRAVENOUS at 00:45

## 2021-08-17 RX ADMIN — KETOROLAC TROMETHAMINE 15 MG: 15 INJECTION, SOLUTION INTRAMUSCULAR; INTRAVENOUS at 00:44

## 2021-08-17 RX ADMIN — CEFTRIAXONE 1 G: 1 INJECTION, POWDER, FOR SOLUTION INTRAMUSCULAR; INTRAVENOUS at 00:45

## 2021-08-17 ASSESSMENT — ENCOUNTER SYMPTOMS
FLANK PAIN: 1
DYSURIA: 1
BRUISES/BLEEDS EASILY: 0
CONFUSION: 0
SHORTNESS OF BREATH: 0
WEAKNESS: 0
HEMATURIA: 0
BACK PAIN: 1
FEVER: 0
ABDOMINAL PAIN: 0

## 2021-08-17 NOTE — DISCHARGE INSTRUCTIONS
Thank you for your patience today. Please follow up with your primary doctor in 2-3 days for follow up and further evaluation. Please call to make an appointment. Please drink plenty of fluids. Please take 600mg of Ibuprofen every 6 hours for fever and pain. Please take Tylenol 1000mg every 4-6 hours for pain.  Please take antibiotics twice daily as directed.  Please return to the ER if you develop high fever, persistent vomiting, severe pain, or any worsening of your current symptoms. It was a pleasure taking care of you today. We hope you feel better soon

## 2021-08-17 NOTE — ED PROVIDER NOTES
ED Provider Note  [facility]      History     Chief Complaint   Patient presents with     Flank Pain     HPI  Ximena Diaz is a 20 year old female who has a past medical history of nephrolithiasis, pyelonephritis who presents emergency department from home with her significant other for evaluation of flank pain.  Patient complains of bilateral flank pain for the past 2 to 3 days.  Patient describes the pain is a constant sharp stabbing pain in both her flanks that seems to be getting worse.  Patient reports occasionally radiation up her back, patient states that she has tried Tylenol and Advil at home with no improvement of her symptoms.  Patient reports that time pain is so severe that she feels as if she has trouble breathing or difficulty moving.  Pain is worse with movement.  Patient denies any recent fevers however does report fevers last week when she got the first dose of the Covid vaccine.  Patient also reports associated dysuria, urinary frequency and urgency.  Denies any chest pain, shortness of breath, cough, weakness, paresthesias.  Positive diarrhea the past few days.  Patient reports tobacco use (e-cigarettes), occasional alcohol use, denies any substance abuse.    Past Medical History  No past medical history on file.  Past Surgical History:   Procedure Laterality Date     COMBINED CYSTOSCOPY, URETEROSCOPY, LASER HOLMIUM LITHOTRIPSY URETER(S) Right 8/25/2020    Procedure: CYSTOSCOPY, RIGHT RETROGRADE PYELOGRAM, RIGHT URETEROSCOPY AND BASKET REMOVAL OF STONE, Balloon [place],RIGHT URETERAL STENT PLACEMENT;  Surgeon: Chago Fontanez MD;  Location:  OR     ESOPHAGOSCOPY, GASTROSCOPY, DUODENOSCOPY (EGD), COMBINED N/A 2/17/2016    Procedure: COMBINED ESOPHAGOSCOPY, GASTROSCOPY, DUODENOSCOPY (EGD);  Surgeon: Arnav Schmid MD;  Location:  OR     ESOPHAGOSCOPY, GASTROSCOPY, DUODENOSCOPY (EGD), COMBINED N/A 2/17/2016    Procedure: COMBINED ESOPHAGOSCOPY, GASTROSCOPY,  "DUODENOSCOPY (EGD), BIOPSY SINGLE OR MULTIPLE;  Surgeon: Arnav Schmid MD;  Location: [facility] OR     NO HISTORY OF SURGERY       TONSILLECTOMY, ADENOIDECTOMY, COMBINED Bilateral 3/25/2019    Procedure: BILATERAL  TONSILLECTOMY;  Surgeon: Giovanni Travis MD;  Location: [facility] OR     ciprofloxacin (CIPRO) 500 MG tablet  etonogestrel (IMPLANON/NEXPLANON) 68 MG IMPL  fexofenadine (ALLEGRA) 180 MG tablet  ibuprofen (ADVIL/MOTRIN) 200 MG tablet  sertraline (ZOLOFT) 25 MG tablet      Allergies   Allergen Reactions     Nuts Anaphylaxis     No Clinical Screening - See Comments Rash     Nickel      Family History  No family history on file.  Social History   Social History     Tobacco Use     Smoking status: Never Smoker     Smokeless tobacco: Never Used   Substance Use Topics     Alcohol use: Yes     Comment: on weekends     Drug use: No      Past medical history, past surgical history, medications, allergies, family history, and social history were reviewed with the patient. No additional pertinent items.       Review of Systems   Constitutional: Negative for fever.   HENT: Negative for congestion.    Eyes: Negative for visual disturbance.   Respiratory: Negative for shortness of breath.    Cardiovascular: Negative for chest pain.   Gastrointestinal: Negative for abdominal pain.   Endocrine: Negative for polyuria.   Genitourinary: Positive for dysuria, flank pain and urgency. Negative for hematuria.   Musculoskeletal: Positive for back pain.   Skin: Negative for rash.   Neurological: Negative for weakness.   Hematological: Does not bruise/bleed easily.   Psychiatric/Behavioral: Negative for confusion.     Physical Exam   BP: (!) 146/109  Pulse: (!) 137  Temp: 98.8  F (37.1  C)  Resp: 16  Height: 160 cm (5' 3\")  Weight: 119 kg (262 lb 5.6 oz)  SpO2: 98 %  Physical Exam  General: Afebrile, no acute distress   HEENT: Normocephalic, atraumatic, conjunctivae normal. MMM  Neck: non-tender, supple  Cardio: regular rate. regular rhythm "   Resp: Normal work of breathing, no respiratory distress, lungs clear bilaterally, no wheezing, rhonchi, rales  Chest/Back: no visual signs of trauma, +b/l CVA tenderness   Abdomen: soft, non distension, no tenderness, no peritoneal signs   Neuro: alert and fully oriented. CN II-XII grossly intact. Grossly normal strength and sensation in all extremities.   MSK: no deformities. Normal range of motion  Integumentary/Skin: no rash visualized, normal color  Psych: normal affect, normal behavior    ED Course      Procedures       Results for orders placed or performed during the hospital encounter of 08/16/21   CT Abdomen Pelvis w/o Contrast     Status: None    Narrative    EXAM: CT ABDOMEN PELVIS W/O CONTRAST  LOCATION: [facility]  DATE/TIME: 8/17/2021 1:19 AM    INDICATION: Bilateral flank pain, urinary tract infection, prior kidney stones, kidney stone suspected  COMPARISON: 08/04/2020  TECHNIQUE: CT scan of the abdomen and pelvis was performed without IV contrast. Multiplanar reformats were obtained. Dose reduction techniques were used.  CONTRAST: None.    FINDINGS:   LOWER CHEST: Normal.    HEPATOBILIARY: Normal.    PANCREAS: Normal.    SPLEEN: Normal.    ADRENAL GLANDS: Normal.    KIDNEYS/BLADDER: Nonobstructing 1 mm stone mid right kidney otherwise normal. No hydronephrosis.    BOWEL: Normal retrocecal appendix. No bowel obstruction.    LYMPH NODES: Normal.    VASCULATURE: Unremarkable.    PELVIC ORGANS: Normal.    MUSCULOSKELETAL: Small fat-containing paraumbilical hernia. Presumed bone islands.      Impression    IMPRESSION:   1.  Nonobstructing 1 mm stone right kidney. No hydronephrosis.  2.  Small fat-containing paraumbilical hernia.     UA with Microscopic reflex to Culture     Status: Abnormal    Specimen: Urine, Midstream   Result Value Ref Range    Color Urine Light Yellow Colorless, Straw, Light Yellow, Yellow    Appearance Urine Slightly Cloudy (A) Clear     Glucose Urine Negative Negative mg/dL    Bilirubin Urine Negative Negative    Ketones Urine Negative Negative mg/dL    Specific Gravity Urine 1.014 1.003 - 1.035    Blood Urine Moderate (A) Negative    pH Urine 6.5 5.0 - 7.0    Protein Albumin Urine 50  (A) Negative mg/dL    Urobilinogen Urine Normal Normal, 2.0 mg/dL    Nitrite Urine Negative Negative    Leukocyte Esterase Urine Large (A) Negative    Bacteria Urine Moderate (A) None Seen /HPF    Mucus Urine Present (A) None Seen /LPF    RBC Urine 28 (H) <=2 /HPF    WBC Urine 121 (H) <=5 /HPF    Squamous Epithelials Urine <1 <=1 /HPF    Transitional Epithelials Urine <1 <=1 /HPF    Narrative    Urine Culture ordered based on laboratory criteria   CBC with platelets differential     Status: Abnormal    Narrative    The following orders were created for panel order CBC with platelets differential.  Procedure                               Abnormality         Status                     ---------                               -----------         ------                     CBC with platelets and d...[[id number]]  Abnormal            Final result                 Please view results for these tests on the individual orders.   Basic metabolic panel     Status: Normal   Result Value Ref Range    Sodium 138 133 - 144 mmol/L    Potassium 3.7 3.4 - 5.3 mmol/L    Chloride 109 94 - 109 mmol/L    Carbon Dioxide (CO2) 24 20 - 32 mmol/L    Anion Gap 5 3 - 14 mmol/L    Urea Nitrogen 10 7 - 30 mg/dL    Creatinine 0.90 0.52 - 1.04 mg/dL    Calcium 9.5 8.5 - 10.1 mg/dL    Glucose 85 70 - 99 mg/dL    GFR Estimate >90 >60 mL/min/1.73m2   CBC with platelets and differential     Status: Abnormal   Result Value Ref Range    WBC Count 14.8 (H) 4.0 - 11.0 10e3/uL    RBC Count 5.53 (H) 3.80 - 5.20 10e6/uL    Hemoglobin 15.4 11.7 - 15.7 g/dL    Hematocrit 47.4 (H) 35.0 - 47.0 %    MCV 86 78 - 100 fL    MCH 27.8 26.5 - 33.0 pg    MCHC 32.5 31.5 - 36.5 g/dL    RDW 13.0 10.0 - 15.0 %    Platelet  Count 414 150 - 450 10e3/uL    % Neutrophils 71 %    % Lymphocytes 20 %    % Monocytes 6 %    % Eosinophils 1 %    % Basophils 1 %    % Immature Granulocytes 1 %    NRBCs per 100 WBC 0 <1 /100    Absolute Neutrophils 10.7 (H) 1.6 - 8.3 10e3/uL    Absolute Lymphocytes 2.9 0.8 - 5.3 10e3/uL    Absolute Monocytes 0.9 0.0 - 1.3 10e3/uL    Absolute Eosinophils 0.1 0.0 - 0.7 10e3/uL    Absolute Basophils 0.1 0.0 - 0.2 10e3/uL    Absolute Immature Granulocytes 0.1 (H) <=0.0 10e3/uL    Absolute NRBCs 0.0 10e3/uL   HCG qualitative urine     Status: Normal   Result Value Ref Range    hCG Urine Qualitative Negative Negative   iStat Gases (lactate) venous, POCT     Status: Abnormal   Result Value Ref Range    Lactic Acid POCT 0.6 <=2.0 mmol/L    Bicarbonate Venous POCT 25 21 - 28 mmol/L    O2 Sat, Venous POCT 45 (L) 94 - 100 %    pCO2V Venous POCT 45 40 - 50 mm Hg    pH Venous POCT 7.35 7.32 - 7.43    pO2 Venous POCT 26 25 - 47 mm Hg     Medications   0.9% sodium chloride BOLUS (1,000 mLs Intravenous New Bag 8/17/21 0045)     Followed by   sodium chloride 0.9% infusion (has no administration in time range)   ondansetron (ZOFRAN) injection 4 mg (4 mg Intravenous Given 8/16/21 2151)   0.9% sodium chloride BOLUS (0 mLs Intravenous Stopped 8/16/21 2338)   ketorolac (TORADOL) injection 15 mg (15 mg Intravenous Given 8/17/21 0044)   cefTRIAXone (ROCEPHIN) 1 g vial to attach to  mL bag for ADULTS or NS 50 mL bag for PEDS (1 g Intravenous New Bag 8/17/21 0045)        Assessments & Plan (with Medical Decision Making)   Ximena Diaz is a 20 year old female who has a past medical history of nephrolithiasis, pyelonephritis who presents emergency department from home with her significant other for evaluation of flank pain.  Upon arrival patient is well-appearing, afebrile, mild distress secondary to pain.  Patient blood pressure 107/90, heart rate mild tachycardic 103 bpm, 98% on room air.  Abdomen soft, nontender, nondistended, no  peritoneal signs, on exam patient with bilateral CVA tenderness.  Differential diagnosis includes but is not limited to cystitis versus pyelonephritis versus nephrolithiasis versus infected stone among others.  At this time plan for comprehensive labs, urinalysis, CT scan abdomen pelvis to further evaluate patient's flank pain.    I reviewed comprehensive labs which are remarkable for leukocytosis with elevated blood cell count 14.8, hemoglobin 15.4, no acute metabolic electro abnormality, creatinine 0.090, urinalysis with moderate amount of blood, large leukocyte esterase, moderate bacteria, wbc 121, rbc 28.  At this time given patient's symptoms of flank pain, dysuria, leukocytosis, and urinalysis suggestive of infection, will treat with 1 g of ceftriaxone in the emergency department along with 1 L normal saline IV fluid bolus, IV Toradol for pain.  Will obtain blood cultures, lactic acid, and CT scan.    Negative pregnancy test, lactic acid normal at 0.6. I reviewed CT scan of the abdomen pelvis which demonstrates a nonobstructing 1 mm stone in the mid right kidney, no hydronephrosis, small fat-containing periumbilical hernia.    I discussed with urology 0217 am who reviewed CT images, laboratory testing. No signs of obstruction, would not treat as infected stone. Would treat for pyelonephritis outpatient with close follow up and strict return precautions. No acute urological intervention.  On reevaluation patient resting comfortably, reports improvement of her symptoms after IV Toradol.  Patient did receive a dose of IV antibiotics in the emergency department.  At this time will treat for acute pyelonephritis, patient is nontoxic-appearing, afebrile, normal lactic acid.  Plan for outpatient management with ciprofloxacin, pain control with Tylenol and ibuprofen, close follow-up with her primary care provider and urologist.  Strict return precautions discussed if high fever, severe pain, persistent vomiting,  nursing symptoms.  Patient understands agrees with the plan.    I have reviewed the nursing notes. I have reviewed the findings, diagnosis, plan and need for follow up with the patient.    New Prescriptions    CIPROFLOXACIN (CIPRO) 500 MG TABLET    Take 1 tablet (500 mg) by mouth 2 times daily for 6 days       Final diagnoses:   Flank pain   Pyelonephritis, acute   Nephrolithiasis       --  Joy Ramires MD  [facility] EMERGENCY DEPARTMENT  8/16/2021     Joy Ramires MD  08/17/21 0249

## 2021-08-18 VITALS
RESPIRATION RATE: 18 BRPM | TEMPERATURE: 98.3 F | WEIGHT: 260 LBS | DIASTOLIC BLOOD PRESSURE: 84 MMHG | SYSTOLIC BLOOD PRESSURE: 117 MMHG | OXYGEN SATURATION: 97 % | HEIGHT: 64 IN | HEART RATE: 106 BPM | BODY MASS INDEX: 44.39 KG/M2

## 2021-08-18 PROCEDURE — 85025 COMPLETE CBC W/AUTO DIFF WBC: CPT | Performed by: EMERGENCY MEDICINE

## 2021-08-18 PROCEDURE — 80053 COMPREHEN METABOLIC PANEL: CPT | Performed by: EMERGENCY MEDICINE

## 2021-08-18 PROCEDURE — 36415 COLL VENOUS BLD VENIPUNCTURE: CPT | Performed by: EMERGENCY MEDICINE

## 2021-08-18 PROCEDURE — 999N000104 HC STATISTIC NO CHARGE

## 2021-08-18 PROCEDURE — 250N000011 HC RX IP 250 OP 636: Performed by: EMERGENCY MEDICINE

## 2021-08-18 PROCEDURE — 96374 THER/PROPH/DIAG INJ IV PUSH: CPT

## 2021-08-18 RX ORDER — ONDANSETRON 2 MG/ML
4 INJECTION INTRAMUSCULAR; INTRAVENOUS ONCE
Status: COMPLETED | OUTPATIENT
Start: 2021-08-18 | End: 2021-08-18

## 2021-08-18 RX ADMIN — ONDANSETRON 4 MG: 2 INJECTION INTRAMUSCULAR; INTRAVENOUS at 23:33

## 2021-08-18 ASSESSMENT — MIFFLIN-ST. JEOR: SCORE: 1924.35

## 2021-08-19 ENCOUNTER — PATIENT OUTREACH (OUTPATIENT)
Dept: FAMILY MEDICINE | Facility: CLINIC | Age: 21
End: 2021-08-19

## 2021-08-19 ENCOUNTER — VIRTUAL VISIT (OUTPATIENT)
Dept: FAMILY MEDICINE | Facility: CLINIC | Age: 21
End: 2021-08-19
Payer: COMMERCIAL

## 2021-08-19 ENCOUNTER — HOSPITAL ENCOUNTER (EMERGENCY)
Facility: CLINIC | Age: 21
Discharge: HOME | End: 2021-08-19
Admitting: EMERGENCY MEDICINE
Payer: COMMERCIAL

## 2021-08-19 DIAGNOSIS — N12 PYELONEPHRITIS: ICD-10-CM

## 2021-08-19 DIAGNOSIS — N20.0 KIDNEY STONE: Primary | ICD-10-CM

## 2021-08-19 LAB
ALBUMIN SERPL-MCNC: 4 G/DL (ref 3.4–5)
ALP SERPL-CCNC: 111 U/L (ref 40–150)
ALT SERPL W P-5'-P-CCNC: 37 U/L (ref 0–50)
ANION GAP SERPL CALCULATED.3IONS-SCNC: 5 MMOL/L (ref 3–14)
AST SERPL W P-5'-P-CCNC: 21 U/L (ref 0–45)
BACTERIA UR CULT: ABNORMAL
BACTERIA UR CULT: ABNORMAL
BASOPHILS # BLD AUTO: 0.1 10E3/UL (ref 0–0.2)
BASOPHILS NFR BLD AUTO: 0 %
BILIRUB SERPL-MCNC: 0.4 MG/DL (ref 0.2–1.3)
BUN SERPL-MCNC: 7 MG/DL (ref 7–30)
CALCIUM SERPL-MCNC: 9.5 MG/DL (ref 8.5–10.1)
CHLORIDE BLD-SCNC: 107 MMOL/L (ref 94–109)
CO2 SERPL-SCNC: 27 MMOL/L (ref 20–32)
CREAT SERPL-MCNC: 0.92 MG/DL (ref 0.52–1.04)
EOSINOPHIL # BLD AUTO: 0.1 10E3/UL (ref 0–0.7)
EOSINOPHIL NFR BLD AUTO: 1 %
ERYTHROCYTE [DISTWIDTH] IN BLOOD BY AUTOMATED COUNT: 12.8 % (ref 10–15)
GFR SERPL CREATININE-BSD FRML MDRD: 89 ML/MIN/1.73M2
GLUCOSE BLD-MCNC: 82 MG/DL (ref 70–99)
HCT VFR BLD AUTO: 44.4 % (ref 35–47)
HGB BLD-MCNC: 14.6 G/DL (ref 11.7–15.7)
HOLD SPECIMEN: NORMAL
IMM GRANULOCYTES # BLD: 0.1 10E3/UL
IMM GRANULOCYTES NFR BLD: 1 %
LYMPHOCYTES # BLD AUTO: 3.1 10E3/UL (ref 0.8–5.3)
LYMPHOCYTES NFR BLD AUTO: 28 %
MCH RBC QN AUTO: 28.1 PG (ref 26.5–33)
MCHC RBC AUTO-ENTMCNC: 32.9 G/DL (ref 31.5–36.5)
MCV RBC AUTO: 85 FL (ref 78–100)
MONOCYTES # BLD AUTO: 0.7 10E3/UL (ref 0–1.3)
MONOCYTES NFR BLD AUTO: 6 %
NEUTROPHILS # BLD AUTO: 7.2 10E3/UL (ref 1.6–8.3)
NEUTROPHILS NFR BLD AUTO: 64 %
NRBC # BLD AUTO: 0 10E3/UL
NRBC BLD AUTO-RTO: 0 /100
PLATELET # BLD AUTO: 390 10E3/UL (ref 150–450)
POTASSIUM BLD-SCNC: 3.9 MMOL/L (ref 3.4–5.3)
PROT SERPL-MCNC: 8.5 G/DL (ref 6.8–8.8)
RBC # BLD AUTO: 5.2 10E6/UL (ref 3.8–5.2)
SODIUM SERPL-SCNC: 139 MMOL/L (ref 133–144)
WBC # BLD AUTO: 11.2 10E3/UL (ref 4–11)

## 2021-08-19 PROCEDURE — 99213 OFFICE O/P EST LOW 20 MIN: CPT | Mod: 95 | Performed by: PHYSICIAN ASSISTANT

## 2021-08-19 NOTE — LETTER
[facility]  [address] [address] [facility] [address]  Phone: [phone]    August 19, 2021        Ximena Diaz  [address] [facility] [address]          To whom it may concern:    RE: Ximena Diaz    Patient was seen and treated today at our clinic and missed work. Please excuse Ximena Diaz from work due to illness from 8/16/-8/23/2021.     Please contact me for questions or concerns.      Sincerely,        Curtis Holley PA-C

## 2021-08-19 NOTE — ED TRIAGE NOTES
Patient see her Monday and has known kidney stone. Coming back here for pain control as she can't control pain at home.

## 2021-08-19 NOTE — TELEPHONE ENCOUNTER
This pt was Discharged from [facility] Emergency Department on 8/16/21 for flank pain, pyelonephritis, and nephrolithiasis and on 8/19/21 Pt Left Without Being Seen after Triage on 8/19/21    Curtis Holley PA-C, identified as the PCP.    What type of discharge? Emergency Department x 2  Risk of Hospital admission or ED visit: 39%  Is a TCM episode required? No  When should the patient follow up with PCP? within 30 days of discharge., or as specified in discharge plan.    Denia Little, CHAVAN, RN

## 2021-08-19 NOTE — TELEPHONE ENCOUNTER
Patient reports that was in the E.R. again and has a kidney stone and bladder infection.  She went back last night because she couldn't move and the pain was so significant.  She called an ER nurse about the pain and he said to go to the E.R. She reports it was insanely busy.  Today she still has pain, but it is not as bad as it was last night. The pain  comes and goes.  She needed to vomiting (from the pain) she did and was in so much pain she laid on the floor for 45 minutes. She has tried acetaminophen and this is not working.  She has an appointment 8/31/2021 with urology. she is currently being treated with cephalexin for 6 days.      Nursing action: Consult with Curtis Holley PA-C.  He is willing to add her on in a video visit now to assess her pain and make recommendations. The patient was added on to his schedule and she was contacted with this information and asked to keep her cell phone by her and the ringers on. Patient verbalizes good understanding, agrees with plan and states she needs no further support. Myriam Scherer R.N.

## 2021-08-19 NOTE — PROGRESS NOTES
Negrita is a 20 year old who is being evaluated via a billable video visit.      How would you like to obtain your AVS? MyChart  If the video visit is dropped, the invitation should be resent by: Text to cell phone: [phone]  Will anyone else be joining your video visit? No      Video Start Time: 3:42 PM    Assessment & Plan       ICD-10-CM    1. Kidney stone  N20.0    2. Pyelonephritis  N12    1-2.  Talk to patient through video visit regarding her concerns.  Based on her symptoms I believe we can continue to monitor this at home.  We talked about pushing fluids to continue her antibiotics and add ibuprofen as needed.  Warning signs were discussed.  If symptoms worsen that she needs to go to emergency room.  We will have her keep her appointment with urology at the end of the month.  A note for work was given to her.    Return in about 1 week (around 8/26/2021) for recheck, As Needed.    Curtis Holley PA-C  [facility]   Negrita is a 20 year old who presents for the following health issues     HPI     Discuss pain management   ED notes reviewed.   Possible pyelonephritis.  She had a 1 mm kidney stone seen on CT scan..  Had episode of pain and vomited.   Seen urology 8/31/2021.   Went back to ED but was so busy and left.     No current fever, mild chills, no body aches. Bilateral flank pain.   Good appetite. Good hydration.   Over all feeling ok now.not worsening symptoms.   Tx: cipro 500mg twice a day for 6 days and tylenol.         Review of Systems   Constitutional, HEENT, cardiovascular, pulmonary, gi and gu systems are negative, except as otherwise noted.      Objective           Vitals:  No vitals were obtained today due to virtual visit.    Physical Exam   GENERAL: Healthy, alert and no distress  EYES: Eyes grossly normal to inspection.  No discharge or erythema, or obvious scleral/conjunctival abnormalities.  RESP: No audible wheeze, cough, or visible cyanosis.   No visible retractions or increased work of breathing.    SKIN: Visible skin clear. No significant rash, abnormal pigmentation or lesions.  NEURO: Cranial nerves grossly intact.  Mentation and speech appropriate for age.  PSYCH: Mentation appears normal, affect normal/bright, judgement and insight intact, normal speech and appearance well-groomed.                Video-Visit Details    Type of service:  Video Visit    Video End Time:3:54 PM    Originating Location (pt. Location): Home    Distant Location (provider location):  [facility]     Platform used for Video Visit: Centro

## 2021-08-20 ENCOUNTER — MYC MEDICAL ADVICE (OUTPATIENT)
Dept: FAMILY MEDICINE | Facility: CLINIC | Age: 21
End: 2021-08-20

## 2021-08-20 NOTE — LETTER
[facility]  [address] [address]  Phone: [phone]    August 24, 2021        Ximena Diaz  [address] [facility] [address]          To whom it may concern:    RE: Ximena Diaz    Patient was seen and treated at our clinic. Ok to return back to work on 8/23/2021.    Please contact me for questions or concerns.      Sincerely,        Curtis Holley PA-C

## 2021-08-20 NOTE — LETTER
[facility]  [address] [facility] [address]  Phone: [phone]    August 25, 2021        Ximena Diaz  [address] [facility] [address]          To whom it may concern:    RE: Ximena Diaz    Patient was seen and treated at our clinic.  Please allow her to return to work on 8/23/2020 and to work half days until her urology appointment on 8/31/2020.    Please contact me for questions or concerns.      Sincerely,        Curtis Holley PA-C

## 2021-08-22 LAB
BACTERIA BLD CULT: NO GROWTH
BACTERIA BLD CULT: NO GROWTH

## 2021-08-23 DIAGNOSIS — F41.9 ANXIETY: ICD-10-CM

## 2021-08-24 RX ORDER — SERTRALINE HYDROCHLORIDE 25 MG/1
TABLET, FILM COATED ORAL
Qty: 90 TABLET | Refills: 1 | Status: SHIPPED | OUTPATIENT
Start: 2021-08-24 | End: 2022-05-27

## 2021-08-24 NOTE — TELEPHONE ENCOUNTER
Called the patient @ [phone] and left a VM stating her letter was completed. Asked to call me back to my direct line to let me know how she would like to receive it.  Yovana Sandoval

## 2021-08-24 NOTE — TELEPHONE ENCOUNTER
Hello-  Patient called back asking if this can possibly get done today. Her HR department was asking her. TC advised you will if able to. She understood.  Yovana Sandoval

## 2021-08-24 NOTE — TELEPHONE ENCOUNTER
To Provider,  Negrita called back and states she is still having quite a bit of pain and not sleeping well. She asks if you would be willing to write a letter for her to return to work as of 8/23/2021 however for just half days until she sees her urologist on 8/31/2021.Please advise.  Yovana Sandoval

## 2021-08-25 ENCOUNTER — TELEPHONE (OUTPATIENT)
Dept: FAMILY MEDICINE | Facility: CLINIC | Age: 21
End: 2021-08-25

## 2021-08-25 NOTE — TELEPHONE ENCOUNTER
Reason for Call:  Form, our goal is to have forms completed with 72 hours, however, some forms may require a visit or additional information.    Type of letter, form or note:  work     Who is the form from?: Patient    Where did the form come from: Patient or family brought in       What clinic location was the form placed at?: [place]    Where the form was placed: [employer]    What number is listed as a contact on the form?: [phone]       Additional comments: Fax when complete to [phone]    Call taken on 8/25/2021 at 3:18 PM by Flavio Sandoval

## 2021-08-25 NOTE — TELEPHONE ENCOUNTER
letter was picked up from  by Negrita Diaz. ID was checked and patient  label was attached to patient  log.

## 2021-08-25 NOTE — TELEPHONE ENCOUNTER
FMLA form's placed in provider's box to complete and return to TC.  Jennifer VALENCIA - [place]

## 2021-08-31 ENCOUNTER — OFFICE VISIT (OUTPATIENT)
Dept: UROLOGY | Facility: CLINIC | Age: 21
End: 2021-08-31
Payer: COMMERCIAL

## 2021-08-31 DIAGNOSIS — M54.9 BILATERAL BACK PAIN, UNSPECIFIED BACK LOCATION, UNSPECIFIED CHRONICITY: Primary | ICD-10-CM

## 2021-08-31 PROCEDURE — 99213 OFFICE O/P EST LOW 20 MIN: CPT | Performed by: UROLOGY

## 2021-08-31 NOTE — Clinical Note
Hi Mr. Holley,    I saw Negrita today for follow-up of her back pain.  Review of her labs and imaging do not believe that this is related to her punctate nonobstructive right kidney stone.  Her symptoms are more in line with muscle skeletal related back pain with sciatica.  I advised that she should follow-up with you for further recommendations or consideration of physical therapy or additional work-up.    Thanks,   Chago Fontanez M.D.  Cell: [phone]

## 2021-08-31 NOTE — NURSING NOTE
Ximena Diaz's goals for this visit include:   Chief Complaint   Patient presents with     RECHECK     Kidney pain for a few months, constant, has been to the ER x2       She requests these members of her care team be copied on today's visit information: no    PCP: Curtis Holley    Referring Provider:  No referring provider defined for this encounter.    There were no vitals taken for this visit.    Do you need any medication refills at today's visit? No    Alta Christensen LPN

## 2021-08-31 NOTE — PROGRESS NOTES
[facility]   CHIEF COMPLAINT   It was my pleasure to see Ximena Diaz who is a 20 year old female for follow-up of BACK PAIN.      HPI   Ximena Diaz is a very pleasant 20 year old female who presents with a history of right-sided nephrolithiasis.  She underwent prior ureteroscopy in August 2020.  She has been having intermittent bilateral back pain with radiation up and down her spine with radiation down the back of both legs.  She has been in the emergency room a few times with a recent CT scan showing a punctate less than 1 mm right kidney stone and no evidence of ureteral stones.  She did recently have a UTI.    PHYSICAL EXAM  Patient is a 20 year old  female   Vitals: not currently breastfeeding.  There is no height or weight on file to calculate BMI.  General Appearance Adult:   Alert, no acute distress, oriented  HENT: throat/mouth:normal, good dentition  Lungs: no respiratory distress, or pursed lip breathing  Heart: No obvious jugular venous distension present  Abdomen: soft, nontender, no organomegaly or masses  Back: No costovertebral tenderness bilaterally   Musculoskeltal: extremities normal, no peripheral edema  Skin: no suspicious lesions or rashes  Neuro: Alert, oriented, speech and mentation normal  Psych: affect and mood normal  Gait: Normal    UA RESULTS:  Recent Labs   Lab Test 08/16/21  2150 06/25/21  1100   COLOR Light Yellow Yellow   APPEARANCE Slightly Cloudy* Slightly Cloudy   URINEGLC Negative Negative   URINEBILI Negative Negative   URINEKETONE Negative Negative   SG 1.014 1.020   UBLD Moderate* Trace*   URINEPH 6.5 7.0   PROTEIN 50 * Negative   UROBILINOGEN  --  0.2   NITRITE Negative Negative   LEUKEST Large* Negative   RBCU 28* O - 2   WBCU 121* 0 - 5        Creatinine   Date Value Ref Range Status   08/18/2021 0.92 0.52 - 1.04 mg/dL Final   04/14/2021 0.81 0.52 - 1.04 mg/dL Final      IMAGING:  All pertinent imaging reviewed:    All imaging studies reviewed by me.  I personally  reviewed these imaging films.  A formal report from radiology will follow.    CT ABD/PEL 8/17/21:  FINDINGS:   LOWER CHEST: Normal.     HEPATOBILIARY: Normal.     PANCREAS: Normal.     SPLEEN: Normal.     ADRENAL GLANDS: Normal.     KIDNEYS/BLADDER: Nonobstructing 1 mm stone mid right kidney otherwise normal. No hydronephrosis.     BOWEL: Normal retrocecal appendix. No bowel obstruction.     LYMPH NODES: Normal.     VASCULATURE: Unremarkable.     PELVIC ORGANS: Normal.     MUSCULOSKELETAL: Small fat-containing paraumbilical hernia. Presumed bone islands.                                                                      IMPRESSION:   1.  Nonobstructing 1 mm stone right kidney. No hydronephrosis.  2.  Small fat-containing paraumbilical hernia.    ASSESSMENT and PLAN  20-year-old female with history of nephrolithiasis with back pain which is inconsistent with renal colic    Back pain  -I reviewed her recent labs which indicate a recent uncomplicated UTI with Staphylococcus saprophyticus  -I reviewed her CT images personally from 8/17/2021 and agree with radiologist interpretation.  I reviewed the images with the patient and we discussed that her punctate less than 1 mm stone in the right kidney would not be the cause of her back pain  -We discussed that her history of bilateral back pain with radiation up and down her spine which includes radiation to her bilateral lower extremities is not consistent with renal colic and is more in line with muscle skeletal back pain with sciatica  -I recommend follow-up with her PCP for further recommendations and consideration of possible physical therapy      Time spent: 20 minutes spent on the date of the encounter doing chart review, history and exam, documentation and further activities as noted above.    Chago Fontanez MD   Urology  [facility]  [facility] Phone: [phone]  [facility] Phone:  [phone]

## 2021-09-18 ENCOUNTER — HEALTH MAINTENANCE LETTER (OUTPATIENT)
Age: 21
End: 2021-09-18

## 2021-11-29 ENCOUNTER — TELEPHONE (OUTPATIENT)
Dept: FAMILY MEDICINE | Facility: CLINIC | Age: 21
End: 2021-11-29
Payer: COMMERCIAL

## 2021-11-29 NOTE — PROGRESS NOTES
"  Assessment & Plan   Problem List Items Addressed This Visit        Digestive    Morbid obesity (H)      Other Visit Diagnoses     Upper respiratory tract infection, unspecified type    -  Primary    Relevant Medications    Dextromethorphan-Guaifenesin  MG TB12    Other Relevant Orders    Symptomatic COVID-19 Virus (Coronavirus) by PCR Nose        Mucinex DM 1200-60  Sudafed 120 mg twice a day or 60 mg twice a day   Ibuprofen 600 mg every 6 hours for pain and pressure in the ear     Covid test is pending  Patient declined strep-she had tonsillectomy 2 years ago and does not get strep since then    20 minutes spent on the date of the encounter doing chart review, history and exam, documentation and further activities per the note       BMI:   Estimated body mass index is 45.7 kg/m  as calculated from the following:    Height as of this encounter: 1.6 m (5' 3\").    Weight as of this encounter: 117 kg (258 lb).           Return in about 5 days (around 12/5/2021), or if symptoms worsen or fail to improve.    Shakila Marshall PA-C  [facility]    Emile Rees is a 21 year old who presents for the following health issues     History of Present Illness       She eats 2-3 servings of fruits and vegetables daily.She consumes 3 sweetened beverage(s) daily.She exercises with enough effort to increase her heart rate 30 to 60 minutes per day.  She exercises with enough effort to increase her heart rate 4 days per week.   She is taking medications regularly.       Acute Illness  Acute illness concerns: cold   Onset/Duration: 4 days  Symptoms:  Fever: no  Chills/Sweats: no  Headache (location?): no  Sinus Pressure: no  Conjunctivitis:  no  Ear Pain: YES: left, pressure  Rhinorrhea: YES  Congestion: YES  Sore Throat: no  Cough: YES-productive of yellow sputum when feels drainage in the throat  Wheeze: no  Decreased Appetite: no  Nausea: no  Vomiting: no  Diarrhea: no  Dysuria/Freq.: " "no  Dysuria or Hematuria: no  Fatigue/Achiness: no  Sick/Strep Exposure: unknown  Therapies tried and outcome: Allegra for daily allergies, cough syrups         Review of Systems   Constitutional, HEENT, cardiovascular, pulmonary, gi and gu systems are negative, except as otherwise noted.      Objective    /84 (BP Location: Left arm, Patient Position: Chair, Cuff Size: Adult Regular)   Pulse 87   Temp 98.1  F (36.7  C) (Tympanic)   Ht 1.6 m (5' 3\")   Wt 117 kg (258 lb)   SpO2 97%   Breastfeeding No   BMI 45.70 kg/m    Body mass index is 45.7 kg/m .     Physical Exam   GENERAL: healthy, alert and no distress  EYES: Eyes grossly normal to inspection, PERRL and conjunctivae and sclerae normal  HENT: right ear canal and TM's normal, left mild bulging without clear or purulent effusion, nose and mouth without ulcers or lesions  NECK: no adenopathy, no asymmetry, masses, or scars and thyroid normal to palpation  RESP: lungs clear to auscultation - no rales, rhonchi or wheezes  BREAST: normal without masses, tenderness or nipple discharge and no palpable axillary masses or adenopathy  CV: regular rate and rhythm, normal S1 S2, no S3 or S4, no murmur, click or rub, no peripheral edema and peripheral pulses strong  ABDOMEN: soft, nontender, no hepatosplenomegaly, no masses and bowel sounds normal  MS: no gross musculoskeletal defects noted, no edema  SKIN: no suspicious lesions or rashes  NEURO: Normal strength and tone, mentation intact and speech normal  PSYCH: mentation appears normal, affect normal/bright                "

## 2021-11-29 NOTE — PATIENT INSTRUCTIONS
Mucinex DM 1200-60  Sudafed 120 mg twice a day or 60 mg twice a day   Ibuprofen 600 mg every 6 hours for pain and pressure in the ear

## 2021-11-29 NOTE — TELEPHONE ENCOUNTER
Patient is calling regarding sunusitis of 4th day.  Patient has a cough and left side ear pain.  She has been taking Dayquil and halls for cough.    OV scheduled with Osiris CHANEY at  clinic tomorrow morning.    Janey Delgado RN

## 2021-11-30 ENCOUNTER — OFFICE VISIT (OUTPATIENT)
Dept: FAMILY MEDICINE | Facility: CLINIC | Age: 21
End: 2021-11-30
Payer: COMMERCIAL

## 2021-11-30 VITALS
DIASTOLIC BLOOD PRESSURE: 84 MMHG | HEIGHT: 63 IN | BODY MASS INDEX: 45.71 KG/M2 | WEIGHT: 258 LBS | HEART RATE: 87 BPM | OXYGEN SATURATION: 97 % | TEMPERATURE: 98.1 F | SYSTOLIC BLOOD PRESSURE: 125 MMHG

## 2021-11-30 DIAGNOSIS — E66.01 MORBID OBESITY (H): ICD-10-CM

## 2021-11-30 DIAGNOSIS — J06.9 UPPER RESPIRATORY TRACT INFECTION, UNSPECIFIED TYPE: Primary | ICD-10-CM

## 2021-11-30 PROCEDURE — U0003 INFECTIOUS AGENT DETECTION BY NUCLEIC ACID (DNA OR RNA); SEVERE ACUTE RESPIRATORY SYNDROME CORONAVIRUS 2 (SARS-COV-2) (CORONAVIRUS DISEASE [COVID-19]), AMPLIFIED PROBE TECHNIQUE, MAKING USE OF HIGH THROUGHPUT TECHNOLOGIES AS DESCRIBED BY CMS-2020-01-R: HCPCS | Performed by: PHYSICIAN ASSISTANT

## 2021-11-30 PROCEDURE — 99213 OFFICE O/P EST LOW 20 MIN: CPT | Performed by: PHYSICIAN ASSISTANT

## 2021-11-30 PROCEDURE — U0005 INFEC AGEN DETEC AMPLI PROBE: HCPCS | Performed by: PHYSICIAN ASSISTANT

## 2021-11-30 RX ORDER — GUAIFENESIN AND DEXTROMETHORPHAN HYDROBROMIDE 1200; 60 MG/1; MG/1
1 TABLET, EXTENDED RELEASE ORAL 2 TIMES DAILY
Qty: 28 TABLET | Refills: 0 | Status: SHIPPED | OUTPATIENT
Start: 2021-11-30 | End: 2022-03-30

## 2021-11-30 ASSESSMENT — MIFFLIN-ST. JEOR: SCORE: 1894.41

## 2021-11-30 ASSESSMENT — PAIN SCALES - GENERAL: PAINLEVEL: MILD PAIN (3)

## 2021-12-01 LAB — SARS-COV-2 RNA RESP QL NAA+PROBE: NEGATIVE

## 2022-01-06 ENCOUNTER — VIRTUAL VISIT (OUTPATIENT)
Dept: FAMILY MEDICINE | Facility: CLINIC | Age: 22
End: 2022-01-06
Payer: COMMERCIAL

## 2022-01-06 DIAGNOSIS — J01.90 ACUTE SINUSITIS WITH SYMPTOMS > 10 DAYS: Primary | ICD-10-CM

## 2022-01-06 PROCEDURE — 99213 OFFICE O/P EST LOW 20 MIN: CPT | Mod: 95 | Performed by: INTERNAL MEDICINE

## 2022-01-06 RX ORDER — FLUTICASONE PROPIONATE 50 MCG
1 SPRAY, SUSPENSION (ML) NASAL DAILY
Qty: 16 G | Refills: 0 | Status: SHIPPED | OUTPATIENT
Start: 2022-01-06 | End: 2022-03-30

## 2022-01-06 NOTE — PROGRESS NOTES
Negrita is a 21 year old who is being evaluated via a billable video visit.      How would you like to obtain your AVS? MyChart  If the video visit is dropped, the invitation should be resent by: Send to e-mail at: [email or web address]  Will anyone else be joining your video visit? No    Video Start Time: 8:55 AM    Assessment & Plan     Acute sinusitis with symptoms > 10 days    Negrita presents with 1.5 weeks of sinus congestion, ear pressure, and cough.  She's had 3 negative home rapid COVID tests, most recent was 2 days ago, so unlikely to be COVID.  Given duration of symptoms, we'll treat with Augmentin and Flonase for possible bacterial sinusitis.  Follow-up as needed if not improving in a week or new/worsening symptoms develop.       - amoxicillin-clavulanate (AUGMENTIN) 875-125 MG tablet; Take 1 tablet by mouth 2 times daily for 7 days  - fluticasone (FLONASE) 50 MCG/ACT nasal spray; Spray 1 spray into both nostrils daily    Nicola Louis MD  [facility]   Negrita is a 21 year old who presents for the following health issues     Chief Complaint   Patient presents with     Cough        HPI     Acute Illness  Acute illness concerns: Sinus pressure, cough and ears plugged.  Had 3 negative rapid tests at home, last test was 2 days ago  Onset/Duration: 1 1/2 weeks  Symptoms:  Fever: no  Chills/Sweats: no  Headache (location?): no  Sinus Pressure: YES- forehead and around nose, bilateral  Conjunctivitis:  no  Ear Pain: YES: bilateral, pressure not pain  Rhinorrhea: YES  Congestion: YES  Sore Throat: a little from cough  Cough: YES-productive of green sputum, feels like this is coming from the lungs  Wheeze: no  No SOB  Decreased Appetite: no  Nausea: no  Vomiting: no  Diarrhea: no  Dysuria/Freq.: no  Dysuria or Hematuria: no  Fatigue/Achiness: no  Sick/Strep Exposure: no  Therapies tried and outcome: OTC Sudafed and Mucinex DM- some help      Review of Systems   Constitutional,  HEENT, pulm, GI,  systems are negative, except as otherwise noted.      Objective           Vitals:  No vitals were obtained today due to virtual visit.    Physical Exam   GENERAL: alert, no distress and sounds somewhat congested  EYES: Eyes grossly normal to inspection.  No discharge or erythema, or obvious scleral/conjunctival abnormalities.  RESP: No audible wheeze, cough, or visible cyanosis.  No visible retractions or increased work of breathing.    SKIN: Visible skin clear. No significant rash, abnormal pigmentation or lesions.  NEURO: Cranial nerves grossly intact.  Mentation and speech appropriate for age.  PSYCH: Mentation appears normal, affect normal/bright, judgement and insight intact, normal speech and appearance well-groomed.            Video-Visit Details    Type of service:  Video Visit    Video End Time:9:00 AM    Originating Location (pt. Location): Other : car    Distant Location (provider location):  [facility]     Platform used for Video Visit: Dropmysite

## 2022-01-08 ENCOUNTER — HEALTH MAINTENANCE LETTER (OUTPATIENT)
Age: 22
End: 2022-01-08

## 2022-03-02 DIAGNOSIS — J01.90 ACUTE SINUSITIS WITH SYMPTOMS > 10 DAYS: ICD-10-CM

## 2022-03-03 NOTE — TELEPHONE ENCOUNTER
Message left for pt to call back regarding flonase prescription. Is this a refill she requested? It was ordered at visit from 1/6/2022 for sinusitis, no refills were ordered.  Flor Nicole RN

## 2022-03-08 RX ORDER — FLUTICASONE PROPIONATE 50 MCG
SPRAY, SUSPENSION (ML) NASAL
Qty: 16 ML | OUTPATIENT
Start: 2022-03-08

## 2022-03-30 ENCOUNTER — OFFICE VISIT (OUTPATIENT)
Dept: OBGYN | Facility: CLINIC | Age: 22
End: 2022-03-30
Payer: COMMERCIAL

## 2022-03-30 VITALS
OXYGEN SATURATION: 98 % | SYSTOLIC BLOOD PRESSURE: 131 MMHG | WEIGHT: 252.8 LBS | HEART RATE: 87 BPM | DIASTOLIC BLOOD PRESSURE: 83 MMHG | HEIGHT: 63 IN | BODY MASS INDEX: 44.79 KG/M2

## 2022-03-30 DIAGNOSIS — Z30.013 INITIATION OF DEPO PROVERA: ICD-10-CM

## 2022-03-30 DIAGNOSIS — Z30.46 ENCOUNTER FOR NEXPLANON REMOVAL: ICD-10-CM

## 2022-03-30 DIAGNOSIS — Z11.3 SCREENING EXAMINATION FOR VENEREAL DISEASE: Primary | ICD-10-CM

## 2022-03-30 DIAGNOSIS — Z12.4 SCREENING FOR MALIGNANT NEOPLASM OF CERVIX: ICD-10-CM

## 2022-03-30 LAB — HCG UR QL: NEGATIVE

## 2022-03-30 PROCEDURE — G0145 SCR C/V CYTO,THINLAYER,RESCR: HCPCS | Performed by: NURSE PRACTITIONER

## 2022-03-30 PROCEDURE — 11982 REMOVE DRUG IMPLANT DEVICE: CPT | Performed by: NURSE PRACTITIONER

## 2022-03-30 PROCEDURE — 87491 CHLMYD TRACH DNA AMP PROBE: CPT | Performed by: NURSE PRACTITIONER

## 2022-03-30 PROCEDURE — 96372 THER/PROPH/DIAG INJ SC/IM: CPT | Mod: 59 | Performed by: NURSE PRACTITIONER

## 2022-03-30 PROCEDURE — 87591 N.GONORRHOEAE DNA AMP PROB: CPT | Performed by: NURSE PRACTITIONER

## 2022-03-30 PROCEDURE — 81025 URINE PREGNANCY TEST: CPT | Performed by: NURSE PRACTITIONER

## 2022-03-30 RX ORDER — MEDROXYPROGESTERONE ACETATE 150 MG/ML
150 INJECTION, SUSPENSION INTRAMUSCULAR
Status: COMPLETED | OUTPATIENT
Start: 2022-03-30 | End: 2022-12-19

## 2022-03-30 RX ADMIN — MEDROXYPROGESTERONE ACETATE 150 MG: 150 INJECTION, SUSPENSION INTRAMUSCULAR at 14:46

## 2022-03-30 ASSESSMENT — PAIN SCALES - GENERAL: PAINLEVEL: NO PAIN (0)

## 2022-03-30 NOTE — PROGRESS NOTES
Clinic Administered Medication Documentation    Administrations This Visit     medroxyPROGESTERone (DEPO-PROVERA) injection 150 mg     Admin Date  03/30/2022 Action  Given Dose  150 mg Route  Intramuscular Site  Right Gluteus Tato Administered By  Phoebe Tran CMA    Ordering Provider: Rita Pena APRN CNP    NDC: 40699-658-94    Lot#: [id number]    : [facility] [employer]    Patient Supplied?: No                  Depo Provera Documentation    URINE HCG: negative    Depo-Provera Standing Order inclusion/exclusion criteria reviewed.   Patient meets: inclusion criteria     BP: 131/83  LAST PAP/EXAM: No results found for: PAP    Prior to injection, verified patient identity using patient's name and date of birth. Medication was administered. Please see MAR and medication order for additional information.     Was entire vial of medication used? Yes  Vial/Syringe: Single dose vial  Expiration Date:  8/2023    Patient instructed to remain in clinic for 15 minutes and report any adverse reaction to staff immediately .  NEXT INJECTION DUE: 6/15/22 - 6/29/22

## 2022-03-30 NOTE — PROGRESS NOTES
"Ximena Diaz is a 21 year old female  No LMP recorded. Patient has had an implant. After 3.5 years of Nexplanon as a contraceptive, she was here today for its removal. Would like to start Depo Provera at this time, does not desire new implant. Last date of intercourse verified. No contraindications to use of Depo Provera. Patient medical, surgical, social, and family history reviewed and updated. ROS: 10 point ROS neg other than the symptoms noted above in the HPI.    Patient is also due for pap smear and STI screening, amenable to having both today.    Ximena was counseled about removal. She voiced her understanding and informed consent was obtained.    /83 (BP Location: Right arm, Patient Position: Sitting, Cuff Size: Adult Large)   Pulse 87   Ht 1.6 m (5' 3\")   Wt 114.7 kg (252 lb 12.8 oz)   SpO2 98%   BMI 44.78 kg/m     This is a well appearing female in no acute distress. Answers questions and maintains eye contact appropriately.   UPT negative    Vulva: No external lesions, normal hair distribution, no adenopathy  BUS:  Normal, no masses noted  Vagina: Moist, pink, no abnormal discharge, well rugated, no lesions  Cervix: Pink, nulliparous, midline. Pap smear and STI screening completed.    PROCEDURE:  Patient was placed in a supine position. Right arm was flexed at the elbow and externally rotated. The implant was located by palpation and marked at the end closest to the elbow with a sterile marker.     The area was cleaned and antiseptic applied. 1.5cc of 1% Lidocaine was injected just underneath the end of the implant closest to the elbow.  Light pressure was applied on the end of the implant closest to the elbow and a 2 mm incision was made in the arm at the tip of the implant closest to the elbow. The implant was gently pushed toward the incision until the tip was visible and was grasped with sterile mosquito foreceps and gently removed.  The incision was closed with a butterfly " closure and an adhesive bandage applied. A sterile gauze with pressure bandage was also applied.    Aseptic conditions were maintained throughout the procedure. The patient tolerated the procedure well.  No apparent complications. Follow up as needed.    Desires Depo Provera. Discussed injection every 3 months, possible menstrual changes and other side effcts. Discussed clinic policy for returning on time for injection. Discussed delay with return to ovulation and need for adequate calcium, weight bearing exercise. First injection today.    Rita MOONEY CNP

## 2022-03-31 LAB
C TRACH DNA SPEC QL NAA+PROBE: NEGATIVE
N GONORRHOEA DNA SPEC QL NAA+PROBE: NEGATIVE

## 2022-04-01 LAB
BKR LAB AP GYN ADEQUACY: NORMAL
BKR LAB AP GYN INTERPRETATION: NORMAL
BKR LAB AP HPV REFLEX: NO
BKR LAB AP PREVIOUS ABNORMAL: NORMAL
PATH REPORT.COMMENTS IMP SPEC: NORMAL
PATH REPORT.COMMENTS IMP SPEC: NORMAL
PATH REPORT.RELEVANT HX SPEC: NORMAL

## 2022-04-07 ENCOUNTER — E-VISIT (OUTPATIENT)
Dept: OBGYN | Facility: CLINIC | Age: 22
End: 2022-04-07
Payer: COMMERCIAL

## 2022-04-07 DIAGNOSIS — Z53.9 ERRONEOUS ENCOUNTER--DISREGARD: Primary | ICD-10-CM

## 2022-04-07 NOTE — TELEPHONE ENCOUNTER
Not appropriate for E-visit. If she is having concerns, should be evaluated in clinic to assess incision site. Can be with me or primary care/UC. No sutures were used-may be talking about steri strips-if so, ok for her to remove them. Rita MOONEY CNP

## 2022-04-07 NOTE — TELEPHONE ENCOUNTER
RN sent pt Clikthrough message with providers advisement.    Zenaida Perkins RN on 4/7/2022 at 10:12 AM

## 2022-05-26 ASSESSMENT — PATIENT HEALTH QUESTIONNAIRE - PHQ9
SUM OF ALL RESPONSES TO PHQ QUESTIONS 1-9: 16
SUM OF ALL RESPONSES TO PHQ QUESTIONS 1-9: 16

## 2022-05-26 ASSESSMENT — ANXIETY QUESTIONNAIRES
6. BECOMING EASILY ANNOYED OR IRRITABLE: NEARLY EVERY DAY
3. WORRYING TOO MUCH ABOUT DIFFERENT THINGS: NEARLY EVERY DAY
8. IF YOU CHECKED OFF ANY PROBLEMS, HOW DIFFICULT HAVE THESE MADE IT FOR YOU TO DO YOUR WORK, TAKE CARE OF THINGS AT HOME, OR GET ALONG WITH OTHER PEOPLE?: VERY DIFFICULT
7. FEELING AFRAID AS IF SOMETHING AWFUL MIGHT HAPPEN: NEARLY EVERY DAY
5. BEING SO RESTLESS THAT IT IS HARD TO SIT STILL: MORE THAN HALF THE DAYS
1. FEELING NERVOUS, ANXIOUS, OR ON EDGE: SEVERAL DAYS
GAD7 TOTAL SCORE: 18
2. NOT BEING ABLE TO STOP OR CONTROL WORRYING: NEARLY EVERY DAY
4. TROUBLE RELAXING: NEARLY EVERY DAY
GAD7 TOTAL SCORE: 18
GAD7 TOTAL SCORE: 18
7. FEELING AFRAID AS IF SOMETHING AWFUL MIGHT HAPPEN: NEARLY EVERY DAY

## 2022-05-27 ENCOUNTER — VIRTUAL VISIT (OUTPATIENT)
Dept: FAMILY MEDICINE | Facility: CLINIC | Age: 22
End: 2022-05-27
Payer: COMMERCIAL

## 2022-05-27 DIAGNOSIS — F41.9 ANXIETY: ICD-10-CM

## 2022-05-27 DIAGNOSIS — E66.01 MORBID OBESITY (H): ICD-10-CM

## 2022-05-27 PROCEDURE — 99213 OFFICE O/P EST LOW 20 MIN: CPT | Mod: 95 | Performed by: PHYSICIAN ASSISTANT

## 2022-05-27 RX ORDER — SERTRALINE HYDROCHLORIDE 25 MG/1
25 TABLET, FILM COATED ORAL DAILY
Qty: 90 TABLET | Refills: 1 | Status: SHIPPED | OUTPATIENT
Start: 2022-05-27 | End: 2023-05-11

## 2022-05-27 ASSESSMENT — ANXIETY QUESTIONNAIRES: GAD7 TOTAL SCORE: 18

## 2022-05-27 ASSESSMENT — PATIENT HEALTH QUESTIONNAIRE - PHQ9
SUM OF ALL RESPONSES TO PHQ QUESTIONS 1-9: 16
10. IF YOU CHECKED OFF ANY PROBLEMS, HOW DIFFICULT HAVE THESE PROBLEMS MADE IT FOR YOU TO DO YOUR WORK, TAKE CARE OF THINGS AT HOME, OR GET ALONG WITH OTHER PEOPLE: VERY DIFFICULT

## 2022-05-27 ASSESSMENT — ENCOUNTER SYMPTOMS: NERVOUS/ANXIOUS: 1

## 2022-05-27 NOTE — PROGRESS NOTES
Negrita is a 21 year old who is being evaluated via a billable video visit.      How would you like to obtain your AVS? MyChart  If the video visit is dropped, the invitation should be resent by: Text to cell phone: [phone]  Will anyone else be joining your video visit? No      Video Start Time: 11:51 AM    Assessment & Plan       ICD-10-CM    1. Anxiety  F41.9 sertraline (ZOLOFT) 25 MG tablet   2. Morbid obesity (H)  E66.01      Talk to patient about her concerns through video visit at this time we will get her back on her Zoloft as she felt she is doing much better on it.  Warning signs side effects were discussed.  We will recheck things in 4 weeks if needed otherwise no later than 6 months.    Return in about 6 months (around 11/27/2022) for recheck.    Curtis Holley PA-C  [facility]   Negrita is a 21 year old who presents for the following health issues  accompanied by her self.    Anxiety    History of Present Illness       Mental Health Follow-up:  Patient presents to follow-up on Depression & Anxiety.Patient's depression since last visit has been:  Bad  The patient is having other symptoms associated with depression.  Patient's anxiety since last visit has been:  Medium  The patient is having other symptoms associated with anxiety.  Any significant life events: grief or loss  Patient is feeling anxious or having panic attacks.  Patient has no concerns about alcohol or drug use.    She eats 4 or more servings of fruits and vegetables daily.She consumes 1 sweetened beverage(s) daily.She exercises with enough effort to increase her heart rate 60 or more minutes per day.  She exercises with enough effort to increase her heart rate 7 days per week. She is missing 2 dose(s) of medications per week.  She is not taking prescribed medications regularly due to remembering to take.    Today's PHQ-9         PHQ-9 Total Score: 16    PHQ-9 Q9 Thoughts of better off  dead/self-harm past 2 weeks :   Not at all    How difficult have these problems made it for you to do your work, take care of things at home, or get along with other people: Very difficult  Today's GEORGINA-7 Score: 18   out of meds for couple months.   Increase anxiety and work stress.   She states the medication has helped her quite a bit.  And she wants to get back on it.  No thoughts of hurting herself or others.    Review of Systems   Psychiatric/Behavioral: The patient is nervous/anxious.       Constitutional, HEENT, cardiovascular, pulmonary, gi and gu systems are negative, except as otherwise noted.      Objective           Vitals:  No vitals were obtained today due to virtual visit.    Physical Exam   GENERAL: Healthy, alert and no distress  EYES: Eyes grossly normal to inspection.  No discharge or erythema, or obvious scleral/conjunctival abnormalities.  RESP: No audible wheeze, cough, or visible cyanosis.  No visible retractions or increased work of breathing.    SKIN: Visible skin clear. No significant rash, abnormal pigmentation or lesions.  NEURO: Cranial nerves grossly intact.  Mentation and speech appropriate for age.  PSYCH: Mentation appears normal, affect normal/bright, judgement and insight intact, normal speech and appearance well-groomed.       Video-Visit Details    Type of service:  Video Visit    Video End Time:11:59 AM    Originating Location (pt. Location): Home    Distant Location (provider location):  [facility]     Platform used for Video Visit: Medabil

## 2022-05-27 NOTE — PROGRESS NOTES
"Negrita is a 21 year old who is being evaluated via a billable telephone visit.      What phone number would you like to be contacted at? ***  How would you like to obtain your AVS? {AVS Preference:012060}    {PROVIDER CHARTING PREFERENCE:743892}    Subjective   Negrita is a 21 year old who presents for the following health issues {ACCOMPANIED BY STATEMENT (Optional):367410}    History of Present Illness       Mental Health Follow-up:  Patient presents to follow-up on Depression & Anxiety.Patient's depression since last visit has been:  Bad  The patient is having other symptoms associated with depression.  Patient's anxiety since last visit has been:  Medium  The patient is having other symptoms associated with anxiety.  Any significant life events: grief or loss  Patient is feeling anxious or having panic attacks.  Patient has no concerns about alcohol or drug use.    She eats 4 or more servings of fruits and vegetables daily.She consumes 1 sweetened beverage(s) daily.She exercises with enough effort to increase her heart rate 60 or more minutes per day.  She exercises with enough effort to increase her heart rate 7 days per week. She is missing 2 dose(s) of medications per week.  She is not taking prescribed medications regularly due to remembering to take.    Today's PHQ-9         PHQ-9 Total Score: 16    PHQ-9 Q9 Thoughts of better off dead/self-harm past 2 weeks :   Not at all    How difficult have these problems made it for you to do your work, take care of things at home, or get along with other people: Very difficult  Today's GEORGINA-7 Score: 18         {additonal problems for provider to add (Optional):638729}    Review of Systems   {ROS COMP (Optional):881000}      Objective           Vitals:  No vitals were obtained today due to virtual visit.    Physical Exam   {GENERAL APPEARANCE:50::\"healthy\",\"alert\",\"no distress\"}  PSYCH: Alert and oriented times 3; coherent speech, normal   rate and volume, able to " "articulate logical thoughts, able   to abstract reason, no tangential thoughts, no hallucinations   or delusions  Her affect is { :3895728::\"normal\"}  RESP: No cough, no audible wheezing, able to talk in full sentences  Remainder of exam unable to be completed due to telephone visits    {Diagnostic Test Results (Optional):751356}    {AMBULATORY ATTESTATION (Optional):531319}        Phone call duration: *** minutes  "

## 2022-06-20 ENCOUNTER — ALLIED HEALTH/NURSE VISIT (OUTPATIENT)
Dept: FAMILY MEDICINE | Facility: CLINIC | Age: 22
End: 2022-06-20
Payer: COMMERCIAL

## 2022-06-20 DIAGNOSIS — Z30.9 CONTRACEPTIVE MANAGEMENT: Primary | ICD-10-CM

## 2022-06-20 PROCEDURE — 96372 THER/PROPH/DIAG INJ SC/IM: CPT | Performed by: NURSE PRACTITIONER

## 2022-06-20 PROCEDURE — 99207 PR NO CHARGE NURSE ONLY: CPT

## 2022-06-20 RX ADMIN — MEDROXYPROGESTERONE ACETATE 150 MG: 150 INJECTION, SUSPENSION INTRAMUSCULAR at 15:27

## 2022-06-20 NOTE — PROGRESS NOTES
BP: Data Unavailable    LAST PAP/EXAM: No results found for: PAP  URINE HCG:not indicated    The following medication was given:     MEDICATION: Depo Provera 150mg  ROUTE: IM  SITE: Ventrogluteal - Left  : Al  LOT #: [id number]  EXP:[id number]  NEXT INJECTION DUE: 9/5/22 - 9/19/22   Provider: LEXX Harper, [facility]

## 2022-07-19 ENCOUNTER — HOSPITAL ENCOUNTER (EMERGENCY)
Facility: CLINIC | Age: 22
Discharge: HOME | End: 2022-07-20
Attending: EMERGENCY MEDICINE | Admitting: EMERGENCY MEDICINE
Payer: COMMERCIAL

## 2022-07-19 ENCOUNTER — APPOINTMENT (OUTPATIENT)
Dept: CT IMAGING | Facility: CLINIC | Age: 22
End: 2022-07-19
Attending: EMERGENCY MEDICINE
Payer: COMMERCIAL

## 2022-07-19 VITALS
OXYGEN SATURATION: 98 % | HEART RATE: 109 BPM | TEMPERATURE: 99.4 F | DIASTOLIC BLOOD PRESSURE: 90 MMHG | SYSTOLIC BLOOD PRESSURE: 149 MMHG | RESPIRATION RATE: 20 BRPM

## 2022-07-19 DIAGNOSIS — R10.13 EPIGASTRIC PAIN: ICD-10-CM

## 2022-07-19 DIAGNOSIS — R10.9 FLANK PAIN: ICD-10-CM

## 2022-07-19 LAB
ALBUMIN UR-MCNC: NEGATIVE MG/DL
APPEARANCE UR: CLEAR
BACTERIA #/AREA URNS HPF: ABNORMAL /HPF
BILIRUB UR QL STRIP: NEGATIVE
COLOR UR AUTO: ABNORMAL
GLUCOSE UR STRIP-MCNC: NEGATIVE MG/DL
HCG UR QL: NEGATIVE
HGB UR QL STRIP: ABNORMAL
KETONES UR STRIP-MCNC: NEGATIVE MG/DL
LEUKOCYTE ESTERASE UR QL STRIP: ABNORMAL
NITRATE UR QL: NEGATIVE
PH UR STRIP: 6.5 [PH] (ref 5–7)
RBC URINE: 1 /HPF
SP GR UR STRIP: 1 (ref 1–1.03)
SQUAMOUS EPITHELIAL: 1 /HPF
UROBILINOGEN UR STRIP-MCNC: NORMAL MG/DL
WBC URINE: 2 /HPF

## 2022-07-19 PROCEDURE — 80053 COMPREHEN METABOLIC PANEL: CPT | Performed by: EMERGENCY MEDICINE

## 2022-07-19 PROCEDURE — 99284 EMERGENCY DEPT VISIT MOD MDM: CPT | Mod: 25

## 2022-07-19 PROCEDURE — 99284 EMERGENCY DEPT VISIT MOD MDM: CPT | Mod: GC | Performed by: EMERGENCY MEDICINE

## 2022-07-19 PROCEDURE — 81001 URINALYSIS AUTO W/SCOPE: CPT | Performed by: EMERGENCY MEDICINE

## 2022-07-19 PROCEDURE — 83690 ASSAY OF LIPASE: CPT | Performed by: EMERGENCY MEDICINE

## 2022-07-19 PROCEDURE — 74176 CT ABD & PELVIS W/O CONTRAST: CPT | Mod: 26 | Performed by: RADIOLOGY

## 2022-07-19 PROCEDURE — 96361 HYDRATE IV INFUSION ADD-ON: CPT

## 2022-07-19 PROCEDURE — 81025 URINE PREGNANCY TEST: CPT | Performed by: EMERGENCY MEDICINE

## 2022-07-19 PROCEDURE — 36415 COLL VENOUS BLD VENIPUNCTURE: CPT | Performed by: EMERGENCY MEDICINE

## 2022-07-19 PROCEDURE — 85025 COMPLETE CBC W/AUTO DIFF WBC: CPT | Performed by: EMERGENCY MEDICINE

## 2022-07-19 PROCEDURE — 96374 THER/PROPH/DIAG INJ IV PUSH: CPT

## 2022-07-19 PROCEDURE — 74176 CT ABD & PELVIS W/O CONTRAST: CPT

## 2022-07-19 RX ORDER — KETOROLAC TROMETHAMINE 15 MG/ML
15 INJECTION, SOLUTION INTRAMUSCULAR; INTRAVENOUS ONCE
Status: COMPLETED | OUTPATIENT
Start: 2022-07-19 | End: 2022-07-20

## 2022-07-19 RX ORDER — SODIUM CHLORIDE 9 MG/ML
INJECTION, SOLUTION INTRAVENOUS CONTINUOUS
Status: DISCONTINUED | OUTPATIENT
Start: 2022-07-20 | End: 2022-07-20 | Stop reason: HOSPADM

## 2022-07-19 RX ORDER — ONDANSETRON 2 MG/ML
4 INJECTION INTRAMUSCULAR; INTRAVENOUS EVERY 30 MIN PRN
Status: DISCONTINUED | OUTPATIENT
Start: 2022-07-19 | End: 2022-07-20 | Stop reason: HOSPADM

## 2022-07-19 ASSESSMENT — ENCOUNTER SYMPTOMS
DIZZINESS: 0
LIGHT-HEADEDNESS: 0
CHEST TIGHTNESS: 0
DYSURIA: 1
BLOOD IN STOOL: 0
PALPITATIONS: 0
FATIGUE: 0
FREQUENCY: 0
NAUSEA: 1
FLANK PAIN: 1
MYALGIAS: 0
BACK PAIN: 0
DIFFICULTY URINATING: 0
DIARRHEA: 0
WEAKNESS: 0
ABDOMINAL PAIN: 0
SHORTNESS OF BREATH: 0
JOINT SWELLING: 0
VOMITING: 0
HEADACHES: 0
FEVER: 0
ARTHRALGIAS: 0
COUGH: 0
CHILLS: 0
ENDOCRINE NEGATIVE: 1
EYES NEGATIVE: 1
PSYCHIATRIC NEGATIVE: 1

## 2022-07-19 NOTE — LETTER
July 20, 2022      To Whom It May Concern:      Ximena Diaz was seen in our Emergency Department today, 07/20/22. She may return to work/school when improved, please excuse her from work today.    Sincerely,        Jacinta Chaudhry MD

## 2022-07-20 LAB
ALBUMIN SERPL BCG-MCNC: 4.8 G/DL (ref 3.5–5.2)
ALP SERPL-CCNC: 96 U/L (ref 35–104)
ALT SERPL W P-5'-P-CCNC: 22 U/L (ref 10–35)
ANION GAP SERPL CALCULATED.3IONS-SCNC: 14 MMOL/L (ref 7–15)
AST SERPL W P-5'-P-CCNC: 23 U/L (ref 10–35)
BASOPHILS # BLD AUTO: 0.1 10E3/UL (ref 0–0.2)
BASOPHILS NFR BLD AUTO: 1 %
BILIRUB SERPL-MCNC: 0.4 MG/DL
BUN SERPL-MCNC: 12.4 MG/DL (ref 6–20)
CALCIUM SERPL-MCNC: 9.8 MG/DL (ref 8.6–10)
CHLORIDE SERPL-SCNC: 105 MMOL/L (ref 98–107)
CREAT SERPL-MCNC: 0.94 MG/DL (ref 0.51–0.95)
DEPRECATED HCO3 PLAS-SCNC: 20 MMOL/L (ref 22–29)
EOSINOPHIL # BLD AUTO: 0.1 10E3/UL (ref 0–0.7)
EOSINOPHIL NFR BLD AUTO: 1 %
ERYTHROCYTE [DISTWIDTH] IN BLOOD BY AUTOMATED COUNT: 12.4 % (ref 10–15)
GFR SERPL CREATININE-BSD FRML MDRD: 87 ML/MIN/1.73M2
GLUCOSE SERPL-MCNC: 87 MG/DL (ref 70–99)
HCT VFR BLD AUTO: 45.8 % (ref 35–47)
HGB BLD-MCNC: 15 G/DL (ref 11.7–15.7)
HOLD SPECIMEN: NORMAL
IMM GRANULOCYTES # BLD: 0.1 10E3/UL
IMM GRANULOCYTES NFR BLD: 1 %
LACTATE SERPL-SCNC: 0.7 MMOL/L (ref 0.7–2)
LIPASE SERPL-CCNC: 21 U/L (ref 13–60)
LYMPHOCYTES # BLD AUTO: 4.5 10E3/UL (ref 0.8–5.3)
LYMPHOCYTES NFR BLD AUTO: 32 %
MCH RBC QN AUTO: 27.9 PG (ref 26.5–33)
MCHC RBC AUTO-ENTMCNC: 32.8 G/DL (ref 31.5–36.5)
MCV RBC AUTO: 85 FL (ref 78–100)
MONOCYTES # BLD AUTO: 0.9 10E3/UL (ref 0–1.3)
MONOCYTES NFR BLD AUTO: 6 %
NEUTROPHILS # BLD AUTO: 8.2 10E3/UL (ref 1.6–8.3)
NEUTROPHILS NFR BLD AUTO: 59 %
NRBC # BLD AUTO: 0 10E3/UL
NRBC BLD AUTO-RTO: 0 /100
PLATELET # BLD AUTO: 389 10E3/UL (ref 150–450)
POTASSIUM SERPL-SCNC: 3.8 MMOL/L (ref 3.4–5.3)
PROT SERPL-MCNC: 8.4 G/DL (ref 6.4–8.3)
RBC # BLD AUTO: 5.37 10E6/UL (ref 3.8–5.2)
SODIUM SERPL-SCNC: 139 MMOL/L (ref 136–145)
WBC # BLD AUTO: 13.9 10E3/UL (ref 4–11)

## 2022-07-20 PROCEDURE — 258N000003 HC RX IP 258 OP 636: Performed by: EMERGENCY MEDICINE

## 2022-07-20 PROCEDURE — 36415 COLL VENOUS BLD VENIPUNCTURE: CPT | Performed by: EMERGENCY MEDICINE

## 2022-07-20 PROCEDURE — 83605 ASSAY OF LACTIC ACID: CPT | Performed by: EMERGENCY MEDICINE

## 2022-07-20 PROCEDURE — 250N000011 HC RX IP 250 OP 636: Performed by: EMERGENCY MEDICINE

## 2022-07-20 RX ADMIN — SODIUM CHLORIDE 1000 ML: 9 INJECTION, SOLUTION INTRAVENOUS at 00:14

## 2022-07-20 RX ADMIN — KETOROLAC TROMETHAMINE 15 MG: 15 INJECTION, SOLUTION INTRAMUSCULAR; INTRAVENOUS at 00:15

## 2022-07-20 ASSESSMENT — PATIENT HEALTH QUESTIONNAIRE - PHQ9
10. IF YOU CHECKED OFF ANY PROBLEMS, HOW DIFFICULT HAVE THESE PROBLEMS MADE IT FOR YOU TO DO YOUR WORK, TAKE CARE OF THINGS AT HOME, OR GET ALONG WITH OTHER PEOPLE: SOMEWHAT DIFFICULT
SUM OF ALL RESPONSES TO PHQ QUESTIONS 1-9: 10
SUM OF ALL RESPONSES TO PHQ QUESTIONS 1-9: 10

## 2022-07-20 NOTE — ED PROVIDER NOTES
ED Provider Note  [facility]      History     Chief Complaint   Patient presents with     Flank Pain     HPI  Ximena Diaz is a 21 year old female with a history of nephrolithiasis (s/p ureteroscopy in September 2020), pyelonephritis, anxiety who presents with bilateral flank and lower back pain.    The patient describes that the pain initially began last Wednesday. It is colicky in nature; at its worst, it is a 10/10 in severity, otherwise it is about a 3-4. She denies any recent injury. On Saturday, she had multiple episodes of nonbloody emesis which resolved on its own, however the flank pain has persisted. She has tried taking Tylenol without significant relief in the pain. She endorses some intermittent dysuria over the past week but denies hematuria or increased urinary urgency.     Patient has a history of previous 2-3mm right kidney stone which was removed via ureteroscopy in September 2020. She subsequently had visualization of a smaller <1mm right kidney stone on CT scan in August 2021 which was passed spontaneously.    Past Medical History  History reviewed. No pertinent past medical history.  Past Surgical History:   Procedure Laterality Date     COMBINED CYSTOSCOPY, URETEROSCOPY, LASER HOLMIUM LITHOTRIPSY URETER(S) Right 8/25/2020    Procedure: CYSTOSCOPY, RIGHT RETROGRADE PYELOGRAM, RIGHT URETEROSCOPY AND BASKET REMOVAL OF STONE, Balloon [place],RIGHT URETERAL STENT PLACEMENT;  Surgeon: Chago Fontanez MD;  Location:  OR     ESOPHAGOSCOPY, GASTROSCOPY, DUODENOSCOPY (EGD), COMBINED N/A 2/17/2016    Procedure: COMBINED ESOPHAGOSCOPY, GASTROSCOPY, DUODENOSCOPY (EGD);  Surgeon: Arnav Schmid MD;  Location:  OR     ESOPHAGOSCOPY, GASTROSCOPY, DUODENOSCOPY (EGD), COMBINED N/A 2/17/2016    Procedure: COMBINED ESOPHAGOSCOPY, GASTROSCOPY, DUODENOSCOPY (EGD), BIOPSY SINGLE OR MULTIPLE;  Surgeon: Arnav Schmid MD;  Location:  OR     NO HISTORY OF SURGERY       TONSILLECTOMY,  ADENOIDECTOMY, COMBINED Bilateral 3/25/2019    Procedure: BILATERAL  TONSILLECTOMY;  Surgeon: Giovanni Travis MD;  Location: [facility] OR     sertraline (ZOLOFT) 25 MG tablet      Allergies   Allergen Reactions     Nuts Anaphylaxis     No Clinical Screening - See Comments Rash     Nickel      Family History  No family history on file.  Social History   Social History     Tobacco Use     Smoking status: Never Smoker     Smokeless tobacco: Never Used   Substance Use Topics     Alcohol use: Yes     Comment: on weekends     Drug use: No      Past medical history, past surgical history, medications, allergies, family history, and social history were reviewed with the patient. No additional pertinent items.       Review of Systems   Constitutional: Negative for chills, fatigue and fever.   HENT: Negative.    Eyes: Negative.    Respiratory: Negative for cough, chest tightness and shortness of breath.    Cardiovascular: Negative for chest pain, palpitations and leg swelling.   Gastrointestinal: Positive for nausea. Negative for abdominal pain, blood in stool, diarrhea and vomiting.   Endocrine: Negative.    Genitourinary: Positive for dysuria and flank pain. Negative for difficulty urinating, frequency and urgency.   Musculoskeletal: Negative for arthralgias, back pain, joint swelling and myalgias.   Neurological: Negative for dizziness, weakness, light-headedness and headaches.   Psychiatric/Behavioral: Negative.      A complete review of systems was performed with pertinent positives and negatives noted in the HPI, and all other systems negative.    Physical Exam   BP: (!) 149/90  Pulse: 109  Temp: 99.4  F (37.4  C)  Resp: 20  SpO2: 98 %  Physical Exam  Vitals and nursing note reviewed.   Constitutional:       General: She is not in acute distress.     Appearance: Normal appearance. She is not ill-appearing.   HENT:      Head: Normocephalic and atraumatic.      Nose: Nose normal.      Mouth/Throat:      Mouth: Mucous  membranes are moist.      Pharynx: Oropharynx is clear.   Eyes:      Extraocular Movements: Extraocular movements intact.      Pupils: Pupils are equal, round, and reactive to light.   Cardiovascular:      Rate and Rhythm: Normal rate and regular rhythm.      Pulses: Normal pulses.      Heart sounds: Normal heart sounds. No murmur heard.    No gallop.   Pulmonary:      Effort: Pulmonary effort is normal. No respiratory distress.      Breath sounds: Normal breath sounds. No wheezing or rales.   Abdominal:      General: Abdomen is flat. There is no distension.      Tenderness: There is no abdominal tenderness. There is right CVA tenderness and left CVA tenderness. There is no guarding or rebound.   Musculoskeletal:         General: Tenderness present. No swelling or deformity.      Cervical back: Normal range of motion.      Comments: Lower back tenderness in lumbar spinal region   Skin:     General: Skin is warm and dry.   Neurological:      General: No focal deficit present.      Mental Status: She is alert and oriented to person, place, and time.   Psychiatric:         Mood and Affect: Mood normal.         Behavior: Behavior normal.         ED Course      Patient hypertensive to 149/90 in the ED and tachycardic to 109. Afebrile and satting well on room air. Workup completed includes:  CBC with diff remarkable for white blood cell count 13.9  CMP shows CO2 20, otherwise within normal limits  Beta hcg negative.  UA with culture with trace blood and leukocyte esterase with few bacteria.   CT abdomen/pelvis without contrast shows nonobstructing stone within right kidney, no ureteral stones, no acute disease process  Patient given fluids, Toradol, and Zofran in the ED.     Results for orders placed or performed during the hospital encounter of 07/19/22   CT Abdomen Pelvis w/o Contrast     Status: None    Narrative    EXAM: CT ABDOMEN PELVIS W/O CONTRAST  LOCATION: [facility]  DATE/TIME: 7/19/2022 11:39 PM    INDICATION: flank pain  COMPARISON: 8/17/2020  TECHNIQUE: CT scan of the abdomen and pelvis was performed without IV contrast. Multiplanar reformats were obtained. Dose reduction techniques were used.  CONTRAST: None.    FINDINGS:   LOWER CHEST: Normal.    HEPATOBILIARY: Normal.    PANCREAS: Normal.    SPLEEN: Normal.    ADRENAL GLANDS: Normal.    KIDNEYS/BLADDER: 4 mm nonobstructing stone right lower pole, no hydronephrosis. No left-sided stones. Bladder negative.    BOWEL: No obstruction or inflammatory change. Appendix normal.    LYMPH NODES: Normal.    VASCULATURE: Unremarkable.    PELVIC ORGANS: No adnexal lesions. No free fluid.    MUSCULOSKELETAL: Normal.      Impression    IMPRESSION:   1.  No definite etiology for symptoms evident. Nothing acute.  2.  There is a 4 mm nonobstructing stone within right kidney but no ureteral stone or hydronephrosis.     [place] Draw     Status: None    Narrative    The following orders were created for panel order [place] Draw.  Procedure                               Abnormality         Status                     ---------                               -----------         ------                     Extra Blue Top Tube[[id number]]                              Final result               Extra Red Top Tube[[id number]]                               Final result               Extra Green Top (Lithium...[[id number]]                      Final result               Extra Purple Top Tube[[id number]]                            Final result                 Please view results for these tests on the individual orders.   UA with Microscopic reflex to Culture     Status: Abnormal    Specimen: Urine, Clean Catch   Result Value Ref Range    Color Urine Straw Colorless, Straw, Light Yellow, Yellow    Appearance Urine Clear Clear    Glucose Urine Negative Negative mg/dL    Bilirubin Urine Negative Negative    Ketones Urine Negative Negative mg/dL     Specific Gravity Urine 1.005 1.003 - 1.035    Blood Urine Trace (A) Negative    pH Urine 6.5 5.0 - 7.0    Protein Albumin Urine Negative Negative mg/dL    Urobilinogen Urine Normal Normal, 2.0 mg/dL    Nitrite Urine Negative Negative    Leukocyte Esterase Urine Trace (A) Negative    Bacteria Urine Few (A) None Seen /HPF    RBC Urine 1 <=2 /HPF    WBC Urine 2 <=5 /HPF    Squamous Epithelials Urine 1 <=1 /HPF    Narrative    Urine Culture not indicated   HCG qualitative urine     Status: Normal   Result Value Ref Range    hCG Urine Qualitative Negative Negative   Extra Blue Top Tube     Status: None   Result Value Ref Range    Hold Specimen JIC    Extra Red Top Tube     Status: None   Result Value Ref Range    Hold Specimen JIC    Extra Green Top (Lithium Heparin) Tube     Status: None   Result Value Ref Range    Hold Specimen JIC    Extra Purple Top Tube     Status: None   Result Value Ref Range    Hold Specimen JIC    Comprehensive metabolic panel     Status: Abnormal   Result Value Ref Range    Sodium 139 136 - 145 mmol/L    Potassium 3.8 3.4 - 5.3 mmol/L    Creatinine 0.94 0.51 - 0.95 mg/dL    Urea Nitrogen 12.4 6.0 - 20.0 mg/dL    Chloride 105 98 - 107 mmol/L    Carbon Dioxide (CO2) 20 (L) 22 - 29 mmol/L    Anion Gap 14 7 - 15 mmol/L    Glucose 87 70 - 99 mg/dL    Calcium 9.8 8.6 - 10.0 mg/dL    Protein Total 8.4 (H) 6.4 - 8.3 g/dL    Albumin 4.8 3.5 - 5.2 g/dL    Bilirubin Total 0.4 <=1.2 mg/dL    Alkaline Phosphatase 96 35 - 104 U/L    AST 23 10 - 35 U/L    ALT 22 10 - 35 U/L    GFR Estimate 87 >60 mL/min/1.73m2   CBC with platelets and differential     Status: Abnormal   Result Value Ref Range    WBC Count 13.9 (H) 4.0 - 11.0 10e3/uL    RBC Count 5.37 (H) 3.80 - 5.20 10e6/uL    Hemoglobin 15.0 11.7 - 15.7 g/dL    Hematocrit 45.8 35.0 - 47.0 %    MCV 85 78 - 100 fL    MCH 27.9 26.5 - 33.0 pg    MCHC 32.8 31.5 - 36.5 g/dL    RDW 12.4 10.0 - 15.0 %    Platelet Count 389 150 - 450 10e3/uL    % Neutrophils 59 %     % Lymphocytes 32 %    % Monocytes 6 %    % Eosinophils 1 %    % Basophils 1 %    % Immature Granulocytes 1 %    NRBCs per 100 WBC 0 <1 /100    Absolute Neutrophils 8.2 1.6 - 8.3 10e3/uL    Absolute Lymphocytes 4.5 0.8 - 5.3 10e3/uL    Absolute Monocytes 0.9 0.0 - 1.3 10e3/uL    Absolute Eosinophils 0.1 0.0 - 0.7 10e3/uL    Absolute Basophils 0.1 0.0 - 0.2 10e3/uL    Absolute Immature Granulocytes 0.1 <=0.4 10e3/uL    Absolute NRBCs 0.0 10e3/uL   Lipase     Status: Normal   Result Value Ref Range    Lipase 21 13 - 60 U/L   Lactic acid whole blood     Status: Normal   Result Value Ref Range    Lactic Acid 0.7 0.7 - 2.0 mmol/L   CBC with platelets differential     Status: Abnormal    Narrative    The following orders were created for panel order CBC with platelets differential.  Procedure                               Abnormality         Status                     ---------                               -----------         ------                     CBC with platelets and d...[[id number]]  Abnormal            Final result                 Please view results for these tests on the individual orders.     Medications   0.9% sodium chloride BOLUS (0 mLs Intravenous Stopped 7/20/22 0140)   ketorolac (TORADOL) injection 15 mg (15 mg Intravenous Given 7/20/22 0015)        Assessments & Plan (with Medical Decision Making)   Ximena Diaz is a 21 year old female with a history of nephrolithiasis (s/p ureteroscopy in September 2020), pyelonephritis, anxiety who presents with bilateral flank and lower back pain.    Differential includes nephrolithiasis, urinary tract infection, musculoskeletal pain, gastritis, pancreatitis. Bilateral nature of the pain makes nephrolithiasis a little less likely than UTI. Patient is hypertensive and tachycardic, which could be consistent with either one. No hematuria, but patient does have dysuria.    I have reviewed the nursing notes. I have reviewed the findings, diagnosis, plan and need for  follow up with the patient.    Discharge Medication List as of 7/20/2022  1:40 AM          Final diagnoses:   Flank pain   Epigastric pain       --  Jessica Kim  [facility] EMERGENCY DEPARTMENT  7/19/2022    --    ED Attending Physician Attestation    I Jacinta Chaudhry MD, cared for this patient with the Resident. I have performed a history and physical examination of the patient and discussed management with the resident. I reviewed the resident's documentation above and agree with the documented findings and plan of care.    Summary of HPI, PE, ED Course   Patient is a 21 year old female evaluated in the emergency department for abdominal pain. Exam notable for epigastric tenderness without rebound or guarding, lower back tenderness. ED course notable for negative abdominal CT. After the completion of care in the emergency department, the patient was discharged.    Critical Care & Procedures  Not applicable.    Medical Decision Making  The medical record was reviewed and interpreted.  Current labs reviewed and interpreted.  Previous labs reviewed and interpreted.  Current images reviewed and interpreted:  .    The patient was provided with a note for work per her request.    Jacinta Chaudhry MD  Emergency Medicine          Jacinta Chaudhry MD  07/20/22 5951

## 2022-07-20 NOTE — DISCHARGE INSTRUCTIONS
TODAY'S VISIT:  You were seen today for flank pain   -   - If you had any labs or imaging/radiology tests performed today, you should also discuss these tests with your usual provider.     FOLLOW-UP:  Please make an appointment to follow up with:  - Your Primary Care Provider. If you do not have a PCP, please call the Primary Care Center (phone: [phone] for an appointment  - Urology Clinic (phone: [phone])     - Have your provider review the results from today's visit with you again to make sure no further follow-up or additional testing is needed based on those results.     RETURN TO THE EMERGENCY DEPARTMENT  Return to the Emergency Department at any time for any new or worsening symptoms or any concerns.

## 2022-07-20 NOTE — ED TRIAGE NOTES
Pt here with bilateral flank pain, hx of kidney stones, states this feels the same.  Ongoing for the past 2 days.

## 2022-07-21 ENCOUNTER — VIRTUAL VISIT (OUTPATIENT)
Dept: FAMILY MEDICINE | Facility: CLINIC | Age: 22
End: 2022-07-21
Payer: COMMERCIAL

## 2022-07-21 ENCOUNTER — MYC MEDICAL ADVICE (OUTPATIENT)
Dept: FAMILY MEDICINE | Facility: CLINIC | Age: 22
End: 2022-07-21

## 2022-07-21 DIAGNOSIS — M54.50 RIGHT-SIDED LOW BACK PAIN WITHOUT SCIATICA, UNSPECIFIED CHRONICITY: ICD-10-CM

## 2022-07-21 DIAGNOSIS — N20.0 KIDNEY STONE: Primary | ICD-10-CM

## 2022-07-21 PROCEDURE — 99213 OFFICE O/P EST LOW 20 MIN: CPT | Mod: 95 | Performed by: PHYSICIAN ASSISTANT

## 2022-07-21 NOTE — LETTER
[facility]  [address] [address]  Phone: [phone]    July 22, 2022        Ximena Diaz  [address] [facility] [address]          To whom it may concern:    RE: Ximena Diaz    Patient was seen and treated at our clinic and missed work.  Ok to return to work at next schedule shift with no restrictions.       Please contact me for questions or concerns.      Sincerely,        Curtis Holley PA-C

## 2022-07-21 NOTE — PROGRESS NOTES
Negrita is a 21 year old who is being evaluated via a billable video visit.      How would you like to obtain your AVS? MyChart  If the video visit is dropped, the invitation should be resent by: Text to cell phone: [phone]  Will anyone else be joining your video visit? No      Assessment & Plan       ICD-10-CM    1. Kidney stone  N20.0 diclofenac (VOLTAREN) 50 MG EC tablet   2. Right-sided low back pain without sciatica, unspecified chronicity  M54.50 diclofenac (VOLTAREN) 50 MG EC tablet   Prescription for diclofenac was given.  I suspect some of her symptoms may be more musculoskeletal based on her history.  She does have a follow-up appoint with urologist however keep that appointment the best not till September.  We talked about daily modification I did provide her a note for work for no lifting.  Recheck things in a couple weeks as needed.        Depression Screening Follow Up    PHQ 7/20/2022   PHQ-9 Total Score 10   Q9: Thoughts of better off dead/self-harm past 2 weeks Not at all         Return in about 4 weeks (around 8/18/2022) for recheck, As Needed.    Curtis Holley PA-C  [facility]    Subjective   Negrita is a 21 year old accompanied by her self, presenting for the following health issues:  RECHECK      HPI     ED/UC Followup:    Facility:  [facility] ER  Date of visit: 07/19-7/20/2022  Reason for visit: stomach pain  Current Status: always in pain    History of kidney stones in the past.  She has had been removed by the urologist.  She is recently in the emergency room.  Please see note in chart for details.  She continues to have right flank pain more with activities and twisting and turning.  He states is constant aching.  She denies any referred pain.  She did have a 4 mm stone that was diagnosed on CT scan, nonobstructive.    Review of Systems   Constitutional, HEENT, cardiovascular, pulmonary, gi and gu systems are negative, except as otherwise noted.       Objective           Vitals:  No vitals were obtained today due to virtual visit.    Physical Exam   GENERAL: Healthy, alert and no distress  EYES: Eyes grossly normal to inspection.  No discharge or erythema, or obvious scleral/conjunctival abnormalities.  RESP: No audible wheeze, cough, or visible cyanosis.  No visible retractions or increased work of breathing.    SKIN: Visible skin clear. No significant rash, abnormal pigmentation or lesions.  NEURO: Cranial nerves grossly intact.  Mentation and speech appropriate for age.  PSYCH: Mentation appears normal, affect normal/bright, judgement and insight intact, normal speech and appearance well-groomed.            Video-Visit Details    Video Start Time: 11: 02am    Type of service:  Video Visit    Video End Time:11:19 AM    Originating Location (pt. Location): Home    Distant Location (provider location):  [facility]     Platform used for Video Visit: [employer]    .  ..  Answers for HPI/ROS submitted by the patient on 7/20/2022  If you checked off any problems, how difficult have these problems made it for you to do your work, take care of things at home, or get along with other people?: Somewhat difficult  PHQ9 TOTAL SCORE: 10

## 2022-07-21 NOTE — LETTER
[facility]  [address] [facility] [address]  Phone: [phone]    July 21, 2022        Ximena Diaz  [address] [address]          To whom it may concern:    RE: Ximena Diaz    Patient was seen and treated today at our clinic. Please allow Negrita not to do any lifting of patients for 7 days.     Please contact me for questions or concerns.      Sincerely,        Curtis Holley PA-C

## 2022-08-02 ENCOUNTER — OFFICE VISIT (OUTPATIENT)
Dept: UROLOGY | Facility: CLINIC | Age: 22
End: 2022-08-02
Payer: COMMERCIAL

## 2022-08-02 ENCOUNTER — TELEPHONE (OUTPATIENT)
Dept: UROLOGY | Facility: CLINIC | Age: 22
End: 2022-08-02

## 2022-08-02 ENCOUNTER — TELEPHONE (OUTPATIENT)
Dept: FAMILY MEDICINE | Facility: CLINIC | Age: 22
End: 2022-08-02

## 2022-08-02 DIAGNOSIS — N20.0 RIGHT KIDNEY STONE: Primary | ICD-10-CM

## 2022-08-02 DIAGNOSIS — R10.9 RIGHT FLANK PAIN: ICD-10-CM

## 2022-08-02 PROCEDURE — 99214 OFFICE O/P EST MOD 30 MIN: CPT | Performed by: UROLOGY

## 2022-08-02 NOTE — TELEPHONE ENCOUNTER
Patient called back and scheduled surgery for 8/9 with Dr. Fontanez in .    H&P with PCP.    COVID test will be done 1-2 days before surgery and will bring a picture of the results.    Post-op scheduled for 8/16 for stent removal.    Writer will mail surg packet.    No further questions/concerns at this time.

## 2022-08-02 NOTE — PROGRESS NOTES
[facility]   CHIEF COMPLAINT   It was my pleasure to see Ximena Diaz who is a 21 year old female for follow-up of BACK PAIN.      HPI   Ximena Diaz is a very pleasant 21 year old female who presents with a history of right-sided nephrolithiasis.  She underwent prior ureteroscopy in August 2020.  She has been having intermittent bilateral back pain with radiation up and down her spine with radiation down the back of both legs.  She has been in the emergency room a few times with a recent CT scan showing a punctate less than 1 mm right kidney stone and no evidence of ureteral stones.  She did recently have a UTI.    TODAY 8/2/2022:  She developed some intermittent pain in early July in the RIGHT flank  This worsened on 7/19/22 and resulted in ER visit  She notes this pain is identical to her last stone episode    Her pain did resolve after her last Ureteroscopy   she notes that her pain has been Making work difficult at times    PHYSICAL EXAM  Patient is a 21 year old  female   Vitals: not currently breastfeeding.  There is no height or weight on file to calculate BMI.  General Appearance Adult:   Alert, no acute distress, oriented  HENT: throat/mouth:normal, good dentition  Lungs: no respiratory distress, or pursed lip breathing  Heart: No obvious jugular venous distension present  Abdomen: soft, nontender, no organomegaly or masses  Back: No costovertebral tenderness bilaterally   Musculoskeltal: extremities normal, no peripheral edema  Skin: no suspicious lesions or rashes  Neuro: Alert, oriented, speech and mentation normal  Psych: affect and mood normal    UA RESULTS:  Recent Labs   Lab Test 07/19/22  2312 08/16/21  2150 06/25/21  1100   COLOR Straw   < > Yellow   APPEARANCE Clear   < > Slightly Cloudy   URINEGLC Negative   < > Negative   URINEBILI Negative   < > Negative   URINEKETONE Negative   < > Negative   SG 1.005   < > 1.020   UBLD Trace*   < > Trace*   URINEPH 6.5   < > 7.0   PROTEIN Negative   <  > Negative   UROBILINOGEN  --   --  0.2   NITRITE Negative   < > Negative   LEUKEST Trace*   < > Negative   RBCU 1   < > O - 2   WBCU 2   < > 0 - 5    < > = values in this interval not displayed.      Creatinine   Date Value Ref Range Status   07/19/2022 0.94 0.51 - 0.95 mg/dL Final   04/14/2021 0.81 0.52 - 1.04 mg/dL Final      IMAGING:  All pertinent imaging reviewed:    All imaging studies reviewed by me.  I personally reviewed these imaging films.  A formal report from radiology will follow.    CT ABD/PEL 8/17/21:  FINDINGS:   LOWER CHEST: Normal.     HEPATOBILIARY: Normal.     PANCREAS: Normal.     SPLEEN: Normal.     ADRENAL GLANDS: Normal.     KIDNEYS/BLADDER: Nonobstructing 1 mm stone mid right kidney otherwise normal. No hydronephrosis.     BOWEL: Normal retrocecal appendix. No bowel obstruction.     LYMPH NODES: Normal.     VASCULATURE: Unremarkable.     PELVIC ORGANS: Normal.     MUSCULOSKELETAL: Small fat-containing paraumbilical hernia. Presumed bone islands.                                                                      IMPRESSION:   1.  Nonobstructing 1 mm stone right kidney. No hydronephrosis.  2.  Small fat-containing paraumbilical hernia.    CT ABD/PEL 7/19/22:  FINDINGS:   LOWER CHEST: Normal.     HEPATOBILIARY: Normal.     PANCREAS: Normal.     SPLEEN: Normal.     ADRENAL GLANDS: Normal.     KIDNEYS/BLADDER: 4 mm nonobstructing stone right lower pole, no hydronephrosis. No left-sided stones. Bladder negative.     BOWEL: No obstruction or inflammatory change. Appendix normal.     LYMPH NODES: Normal.     VASCULATURE: Unremarkable.     PELVIC ORGANS: No adnexal lesions. No free fluid.     MUSCULOSKELETAL: Normal.                                                                      IMPRESSION:   1.  No definite etiology for symptoms evident. Nothing acute.  2.  There is a 4 mm nonobstructing stone within right kidney but no ureteral stone or hydronephrosis.    ASSESSMENT and PLAN  21-year-old  female with history of nephrolithiasis and with right-sided flank pain    Right-sided flank pain with a 4 mm right lower pole stone  - I reviewed her labs which are notable for a normal urinalysis and stable serum creatinine from 7/19/2022  - I reviewed her CT scan and reviewed these images personally.  I shared the images with the patient.  I agree with radiologist interpretation that there is a 4 mm nonobstructive right lower pole stone  - We discussed that nonobstructive stones typically do not cause symptoms, however this does feel consistent with her previous episodes of kidney stones and we discussed that intrarenal obstruction can occur at times  - We discussed treatment options which would be observation, ESWL, or ureteroscopy  - She would like to proceed with ureteroscopy and orders for surgery placed.  We discussed the need for ureteral stent  -This is a chronic problem with exacerbation of symptoms      Time spent: 20 minutes spent on the date of the encounter doing chart review, history and exam, documentation and further activities as noted above.    Chago Fontanez MD   Urology  [facility]  [facility] Phone: [phone]  [facility] Phone: [phone]

## 2022-08-02 NOTE — PROGRESS NOTES
Ximena Diaz's goals for this visit include:   Chief Complaint   Patient presents with     RECHECK     Kidney stone        She requests these members of her care team be copied on today's visit information:     PCP: Curtis Holley    Referring Provider:  No referring provider defined for this encounter.    There were no vitals taken for this visit.    Do you need any medication refills at today's visit?     Mirtha Rivera LPN on 8/2/2022 at 9:06 AM

## 2022-08-02 NOTE — TELEPHONE ENCOUNTER
Reason for call:  Other   Patient called regarding (reason for call): appointment  Additional comments: Pt needs a preop. Preop: DOS: 08/09/2022: Cystoscopy: Dr Alvarado, [facility]. Pt would prefer to see Curtis Holley PA-C. Nobody else available at [facility]. Please contact patient.     Phone number to reach patient:  Cell number on file:    Telephone Information:   Mobile [phone]       Best Time:  Anytime    Can we leave a detailed message on this number?  YES    Travel screening: Not Applicable

## 2022-08-02 NOTE — TELEPHONE ENCOUNTER
Called patient and LVM to call writer back regarding surgery scheduling with Dr. Fontanez.    Planning surgery for 8/9.

## 2022-08-02 NOTE — TELEPHONE ENCOUNTER
LM on VM for the patient to call back.  If she calls back, please see message below.  Jennifer VALENCIA - Yovana

## 2022-08-03 ENCOUNTER — ANESTHESIA EVENT (OUTPATIENT)
Dept: SURGERY | Facility: AMBULATORY SURGERY CENTER | Age: 22
End: 2022-08-03
Payer: COMMERCIAL

## 2022-08-03 ENCOUNTER — MYC MEDICAL ADVICE (OUTPATIENT)
Dept: UROLOGY | Facility: CLINIC | Age: 22
End: 2022-08-03

## 2022-08-05 NOTE — CONFIDENTIAL NOTE
Chago Fontanez MD  [facility] team,     She will just have to wait and see how she is feeling for work on 8/11/2022 and 8/15/2022.  If she is doing well, there is no contraindication from the surgery to work.  You can let her know that the 16th is for her stent removal.     Thanks,   Chago Fontanez M.D.     Received the above message from Dr. Fontanez. My chart message sent to patient.    Milagros Jade RN, BSN

## 2022-08-05 NOTE — CONFIDENTIAL NOTE
Patient reviewed my chart message on 8/5/22 and was welcomed to contact clinic with any questions.    Milagros Jade RN, BSN

## 2022-08-09 ENCOUNTER — HOSPITAL ENCOUNTER (OUTPATIENT)
Facility: AMBULATORY SURGERY CENTER | Age: 22
Discharge: HOME | End: 2022-08-09
Attending: UROLOGY
Payer: COMMERCIAL

## 2022-08-09 ENCOUNTER — ANESTHESIA (OUTPATIENT)
Dept: SURGERY | Facility: AMBULATORY SURGERY CENTER | Age: 22
End: 2022-08-09
Payer: COMMERCIAL

## 2022-08-09 VITALS
SYSTOLIC BLOOD PRESSURE: 109 MMHG | DIASTOLIC BLOOD PRESSURE: 69 MMHG | TEMPERATURE: 97.1 F | OXYGEN SATURATION: 99 % | RESPIRATION RATE: 16 BRPM

## 2022-08-09 DIAGNOSIS — N20.0 RIGHT KIDNEY STONE: Primary | ICD-10-CM

## 2022-08-09 LAB — HCG UR QL: NEGATIVE

## 2022-08-09 PROCEDURE — 52356 CYSTO/URETERO W/LITHOTRIPSY: CPT | Mod: RT

## 2022-08-09 PROCEDURE — 74420 UROGRAPHY RTRGR +-KUB: CPT | Mod: 26 | Performed by: UROLOGY

## 2022-08-09 PROCEDURE — G8907 PT DOC NO EVENTS ON DISCHARG: HCPCS

## 2022-08-09 PROCEDURE — G8916 PT W IV AB GIVEN ON TIME: HCPCS

## 2022-08-09 PROCEDURE — 81025 URINE PREGNANCY TEST: CPT | Performed by: ANESTHESIOLOGY

## 2022-08-09 PROCEDURE — 52356 CYSTO/URETERO W/LITHOTRIPSY: CPT | Mod: RT | Performed by: UROLOGY

## 2022-08-09 PROCEDURE — 99000 SPECIMEN HANDLING OFFICE-LAB: CPT | Performed by: UROLOGY

## 2022-08-09 PROCEDURE — 82365 CALCULUS SPECTROSCOPY: CPT | Mod: 90 | Performed by: UROLOGY

## 2022-08-09 DEVICE — STENT URETERAL PERCUFLEX PLUS 6FRX24CM M0061752620: Type: IMPLANTABLE DEVICE | Site: URETER | Status: FUNCTIONAL

## 2022-08-09 RX ORDER — LIDOCAINE 40 MG/G
CREAM TOPICAL
Status: DISCONTINUED | OUTPATIENT
Start: 2022-08-09 | End: 2022-08-10 | Stop reason: HOSPADM

## 2022-08-09 RX ORDER — OXYCODONE HYDROCHLORIDE 5 MG/1
5 TABLET ORAL EVERY 4 HOURS PRN
Status: DISCONTINUED | OUTPATIENT
Start: 2022-08-09 | End: 2022-08-10 | Stop reason: HOSPADM

## 2022-08-09 RX ORDER — PROPOFOL 10 MG/ML
INJECTION, EMULSION INTRAVENOUS PRN
Status: DISCONTINUED | OUTPATIENT
Start: 2022-08-09 | End: 2022-08-09

## 2022-08-09 RX ORDER — ONDANSETRON 4 MG/1
4 TABLET, ORALLY DISINTEGRATING ORAL EVERY 30 MIN PRN
Status: DISCONTINUED | OUTPATIENT
Start: 2022-08-09 | End: 2022-08-10 | Stop reason: HOSPADM

## 2022-08-09 RX ORDER — LORAZEPAM 2 MG/ML
.5-1 INJECTION INTRAMUSCULAR
Status: DISCONTINUED | OUTPATIENT
Start: 2022-08-09 | End: 2022-08-10 | Stop reason: HOSPADM

## 2022-08-09 RX ORDER — ACETAMINOPHEN 325 MG/1
975 TABLET ORAL ONCE
Status: COMPLETED | OUTPATIENT
Start: 2022-08-09 | End: 2022-08-09

## 2022-08-09 RX ORDER — KETOROLAC TROMETHAMINE 30 MG/ML
15 INJECTION, SOLUTION INTRAMUSCULAR; INTRAVENOUS EVERY 6 HOURS PRN
Status: DISCONTINUED | OUTPATIENT
Start: 2022-08-09 | End: 2022-08-10 | Stop reason: HOSPADM

## 2022-08-09 RX ORDER — SODIUM CHLORIDE, SODIUM LACTATE, POTASSIUM CHLORIDE, CALCIUM CHLORIDE 600; 310; 30; 20 MG/100ML; MG/100ML; MG/100ML; MG/100ML
INJECTION, SOLUTION INTRAVENOUS CONTINUOUS
Status: DISCONTINUED | OUTPATIENT
Start: 2022-08-09 | End: 2022-08-10 | Stop reason: HOSPADM

## 2022-08-09 RX ORDER — FUROSEMIDE 10 MG/ML
INJECTION INTRAMUSCULAR; INTRAVENOUS PRN
Status: DISCONTINUED | OUTPATIENT
Start: 2022-08-09 | End: 2022-08-09

## 2022-08-09 RX ORDER — HYDRALAZINE HYDROCHLORIDE 20 MG/ML
2.5-5 INJECTION INTRAMUSCULAR; INTRAVENOUS EVERY 10 MIN PRN
Status: DISCONTINUED | OUTPATIENT
Start: 2022-08-09 | End: 2022-08-10 | Stop reason: HOSPADM

## 2022-08-09 RX ORDER — MEPERIDINE HYDROCHLORIDE 25 MG/ML
12.5 INJECTION INTRAMUSCULAR; INTRAVENOUS; SUBCUTANEOUS
Status: DISCONTINUED | OUTPATIENT
Start: 2022-08-09 | End: 2022-08-10 | Stop reason: HOSPADM

## 2022-08-09 RX ORDER — ONDANSETRON 2 MG/ML
4 INJECTION INTRAMUSCULAR; INTRAVENOUS EVERY 30 MIN PRN
Status: DISCONTINUED | OUTPATIENT
Start: 2022-08-09 | End: 2022-08-10 | Stop reason: HOSPADM

## 2022-08-09 RX ORDER — ACETAMINOPHEN 500 MG
1000 TABLET ORAL EVERY 6 HOURS PRN
COMMUNITY

## 2022-08-09 RX ORDER — SCOLOPAMINE TRANSDERMAL SYSTEM 1 MG/1
1 PATCH, EXTENDED RELEASE TRANSDERMAL ONCE
Status: DISCONTINUED | OUTPATIENT
Start: 2022-08-09 | End: 2022-08-10 | Stop reason: HOSPADM

## 2022-08-09 RX ORDER — FENTANYL CITRATE 50 UG/ML
INJECTION, SOLUTION INTRAMUSCULAR; INTRAVENOUS PRN
Status: DISCONTINUED | OUTPATIENT
Start: 2022-08-09 | End: 2022-08-09

## 2022-08-09 RX ORDER — ASPIRIN 81 MG
100 TABLET, DELAYED RELEASE (ENTERIC COATED) ORAL DAILY
Qty: 30 TABLET | Refills: 0 | Status: SHIPPED | OUTPATIENT
Start: 2022-08-09 | End: 2022-09-08

## 2022-08-09 RX ORDER — FENTANYL CITRATE 50 UG/ML
25 INJECTION, SOLUTION INTRAMUSCULAR; INTRAVENOUS EVERY 5 MIN PRN
Status: DISCONTINUED | OUTPATIENT
Start: 2022-08-09 | End: 2022-08-10 | Stop reason: HOSPADM

## 2022-08-09 RX ORDER — FENTANYL CITRATE 50 UG/ML
25 INJECTION, SOLUTION INTRAMUSCULAR; INTRAVENOUS
Status: DISCONTINUED | OUTPATIENT
Start: 2022-08-09 | End: 2022-08-10 | Stop reason: HOSPADM

## 2022-08-09 RX ORDER — OXYCODONE HYDROCHLORIDE 5 MG/1
5 TABLET ORAL EVERY 6 HOURS PRN
Qty: 9 TABLET | Refills: 0 | Status: SHIPPED | OUTPATIENT
Start: 2022-08-09 | End: 2022-08-12

## 2022-08-09 RX ORDER — CEFAZOLIN SODIUM 2 G/100ML
2 INJECTION, SOLUTION INTRAVENOUS
Status: COMPLETED | OUTPATIENT
Start: 2022-08-09 | End: 2022-08-09

## 2022-08-09 RX ORDER — ONDANSETRON 2 MG/ML
INJECTION INTRAMUSCULAR; INTRAVENOUS PRN
Status: DISCONTINUED | OUTPATIENT
Start: 2022-08-09 | End: 2022-08-09

## 2022-08-09 RX ORDER — DEXAMETHASONE SODIUM PHOSPHATE 4 MG/ML
INJECTION, SOLUTION INTRA-ARTICULAR; INTRALESIONAL; INTRAMUSCULAR; INTRAVENOUS; SOFT TISSUE PRN
Status: DISCONTINUED | OUTPATIENT
Start: 2022-08-09 | End: 2022-08-09

## 2022-08-09 RX ORDER — ALBUTEROL SULFATE 0.83 MG/ML
2.5 SOLUTION RESPIRATORY (INHALATION) EVERY 4 HOURS PRN
Status: DISCONTINUED | OUTPATIENT
Start: 2022-08-09 | End: 2022-08-10 | Stop reason: HOSPADM

## 2022-08-09 RX ORDER — CEFAZOLIN SODIUM 2 G/100ML
2 INJECTION, SOLUTION INTRAVENOUS SEE ADMIN INSTRUCTIONS
Status: DISCONTINUED | OUTPATIENT
Start: 2022-08-09 | End: 2022-08-10 | Stop reason: HOSPADM

## 2022-08-09 RX ORDER — OXYBUTYNIN CHLORIDE 5 MG/1
5 TABLET, EXTENDED RELEASE ORAL DAILY
Qty: 10 TABLET | Refills: 0 | Status: SHIPPED | OUTPATIENT
Start: 2022-08-09 | End: 2022-08-19

## 2022-08-09 RX ORDER — LIDOCAINE HYDROCHLORIDE 20 MG/ML
INJECTION, SOLUTION INFILTRATION; PERINEURAL PRN
Status: DISCONTINUED | OUTPATIENT
Start: 2022-08-09 | End: 2022-08-09

## 2022-08-09 RX ORDER — TAMSULOSIN HYDROCHLORIDE 0.4 MG/1
0.4 CAPSULE ORAL DAILY
Qty: 10 CAPSULE | Refills: 0 | Status: SHIPPED | OUTPATIENT
Start: 2022-08-09 | End: 2022-12-12

## 2022-08-09 RX ADMIN — CEFAZOLIN SODIUM 2 G: 2 INJECTION, SOLUTION INTRAVENOUS at 07:02

## 2022-08-09 RX ADMIN — FENTANYL CITRATE 50 MCG: 50 INJECTION, SOLUTION INTRAMUSCULAR; INTRAVENOUS at 07:14

## 2022-08-09 RX ADMIN — SCOLOPAMINE TRANSDERMAL SYSTEM 1 PATCH: 1 PATCH, EXTENDED RELEASE TRANSDERMAL at 06:45

## 2022-08-09 RX ADMIN — PROPOFOL 150 MCG/KG/MIN: 10 INJECTION, EMULSION INTRAVENOUS at 07:05

## 2022-08-09 RX ADMIN — FENTANYL CITRATE 50 MCG: 50 INJECTION, SOLUTION INTRAMUSCULAR; INTRAVENOUS at 07:28

## 2022-08-09 RX ADMIN — ONDANSETRON 4 MG: 2 INJECTION INTRAMUSCULAR; INTRAVENOUS at 07:19

## 2022-08-09 RX ADMIN — KETOROLAC TROMETHAMINE 30 MG: 30 INJECTION, SOLUTION INTRAMUSCULAR; INTRAVENOUS at 07:49

## 2022-08-09 RX ADMIN — FUROSEMIDE 20 MG: 10 INJECTION INTRAMUSCULAR; INTRAVENOUS at 07:49

## 2022-08-09 RX ADMIN — ACETAMINOPHEN 975 MG: 325 TABLET ORAL at 06:12

## 2022-08-09 RX ADMIN — PROPOFOL 200 MG: 10 INJECTION, EMULSION INTRAVENOUS at 07:04

## 2022-08-09 RX ADMIN — LIDOCAINE HYDROCHLORIDE 100 MG: 20 INJECTION, SOLUTION INFILTRATION; PERINEURAL at 07:04

## 2022-08-09 RX ADMIN — SODIUM CHLORIDE, SODIUM LACTATE, POTASSIUM CHLORIDE, CALCIUM CHLORIDE: 600; 310; 30; 20 INJECTION, SOLUTION INTRAVENOUS at 06:59

## 2022-08-09 RX ADMIN — DEXAMETHASONE SODIUM PHOSPHATE 4 MG: 4 INJECTION, SOLUTION INTRA-ARTICULAR; INTRALESIONAL; INTRAMUSCULAR; INTRAVENOUS; SOFT TISSUE at 07:19

## 2022-08-09 ASSESSMENT — ENCOUNTER SYMPTOMS: SEIZURES: 0

## 2022-08-09 NOTE — DISCHARGE INSTRUCTIONS
POSTOPERATIVE INSTRUCTIONS    Diagnosis-------------------------------   RIGHT kidney stones    Procedure-------------------------------  Procedure(s) (LRB):  CYSTOSCOPY, RIGHT RETROGRADE PYELOGRAM, RIGHT URETEROSCOPY WITH LASER LITHOTRIPSY AND BASKET REMOVAL OF STONE, RIGHT URETERAL STENT PLACEMENT (Right)      Findings--------------------------------  RIGHT kidney stones removed    Home-going instructions-----------------         Activity Limitation:     - No driving or operating heavy machinery while on narcotic pain medication.     FOLLOW THESE INSTRUCTIONS AS INDICATED BELOW:  - Observe operative area for signs of excessive bleeding.  - You may shower.  - Increase fluid intake to promote clear urine.  - Resume usual diet as tolerated    What to expect while recovering-----------  - You may experience some intermittent bleeding that makes your urine pink or cherry colored. This is normal.  - However, if you are unable to urinate, passing large amount of clots, have becca blood in your urine, or have a temperature >101 degrees, call the urology nurse on call, or present to your nearest emergency department.  - You are encouraged to walk daily, and have no activity restrictions.   - A URETERAL STENT has been placed that allows urine to flow unobstructed from your kidney into your bladder.  The stent has a curl in the kidney and a curl in the bladder.  The curl in the bladder can cause some urgency and frequency of urination as well as some mild blood in the urine.  The curl in the kidney can cause some mild flank discomfort.  This may be more noticeable when you urinate.  A URETERAL STENT is meant to be left in temporarily.  It must be removed or changed no later than 3 months after it's insertion.  If it's not removed it can result in stone overgrowth on the stent that can cause pain, infection, and can be very difficult to remove.      Discharge Medications/instructions:   - Flomax (tamsulosin) to be taken daily  until stent is removed  - Oxybutynin 5mg XL (Ditropan XL) to be taken daily until ureteral stent is removed  - Take Tylenol 1000mg every 6 hours for pain  - Take previously prescribed Voltaren every 6 hours as needed for additional pain control  - Take Oxycodone 5mg every 4-6 hours only for break through pain  - Take Colace while taking Oxycodone to prevent constipation      Questions/concerns------------------------  [facility]: [phone]    Future appointments  You are scheduled for ureteral stent removal next week with Dr. Fontanez.      Chago Fontanez MD       [facility]   Adult Discharge Orders & Instructions     For 24 hours after surgery    Get plenty of rest.  A responsible adult must stay with you for at least 24 hours after you leave the hospital.   Do not drive or use heavy equipment.  If you have weakness or tingling, don't drive or use heavy equipment until this feeling goes away.  Do not drink alcohol.  Avoid strenuous or risky activities.  Ask for help when climbing stairs.   You may feel lightheaded.  IF so, sit for a few minutes before standing.  Have someone help you get up.   If you have nausea (feel sick to your stomach): Drink only clear liquids such as apple juice, ginger ale, broth or 7-Up.  Rest may also help.  Be sure to drink enough fluids.  Move to a regular diet as you feel able.  You may have a slight fever. Call the doctor if your fever is over 100 F (37.7 C) (taken under the tongue) or lasts longer than 24 hours.  You may have a dry mouth, a sore throat, muscle aches or trouble sleeping.  These should go away after 24 hours.  Do not make important or legal decisions.     Call your doctor for any of the followin.  Signs of infection (fever, growing tenderness at the surgery site, a large amount of drainage or bleeding, severe pain, foul-smelling drainage, redness, swelling).    2. It has been over 8 to 10 hours since surgery and you are still  not able to urinate (pass water).    3.  Headache for over 24 hours.    4.  Numbness, tingling or weakness the day after surgery (if you had spinal anesthesia).                  5. Signs of Covid-19 infection (temperature over 100 degrees, shortness of breath, cough, loss of taste/smell, generalized body aches, persistent headache,                  chills, sore throat, nausea/vomiting/diarrhea).    To contact a doctor call [phone]    Had Tylenol at 6:15 am today.     SCOPALAMINE Patch placed behind right ear for nausea relief.  Remove the patch in 24-26 hours.  Dispose in trash and wash hands carefully.     Information for Patients Discharging with a Transderm Scopolamine Patch     Dry mouth is a common side effect.  Drowsiness is another common side effect especially when combined with pain medication.  Please avoid activities that require mental alertness such as driving a car or making important legal decisions.  Since Scopolamine can cause temporary dilation of the pupils and blurred vision if it comes in contact with the eyes; be sure to wash your hands thoroughly with soap and water immediately after handling the patch.   When you remove your patch, please stick it to a tissue or paper towel for disposal.    Remove the patch immediately and contact a physician in the unlikely event that you experience symptoms of acute glaucoma (pain and reddening of the eyes, accompanied by dilated pupils).  Remove the patch if you develop any difficulties urinating.  If you cannot urinate after removing your patch, please notify your surgeon.

## 2022-08-09 NOTE — ANESTHESIA PROCEDURE NOTES
Airway       Patient location during procedure: OR  Staff -        CRNA: Giovanni Knowles APRN CRNA       Performed By: CRNA  Consent for Airway        Urgency: elective  Indications and Patient Condition       Indications for airway management: tico-procedural       Induction type:intravenous       Mask difficulty assessment: 1 - vent by mask    Final Airway Details       Final airway type: supraglottic airway    Supraglottic Airway Details        Type: LMA       Brand: LMA Unique       LMA size: 4    Post intubation assessment        Placement verified by: capnometry, equal breath sounds and chest rise        Number of attempts at approach: 1       Secured with: cloth tape       Ease of procedure: easy       Dentition: Intact and Unchanged

## 2022-08-09 NOTE — ANESTHESIA POSTPROCEDURE EVALUATION
Patient: Ximena Diaz    Procedure: Procedure(s):  CYSTOSCOPY, RIGHT RETROGRADE PYELOGRAM, RIGHT URETEROSCOPY WITH LASER LITHOTRIPSY AND BASKET REMOVAL OF STONE, RIGHT URETERAL STENT PLACEMENT       Anesthesia Type:  No value filed.    Note:  Disposition: Outpatient   Postop Pain Control: Uneventful            Sign Out: Well controlled pain   PONV: No   Neuro/Psych: Uneventful            Sign Out: Acceptable/Baseline neuro status   Airway/Respiratory: Uneventful            Sign Out: Acceptable/Baseline resp. status   CV/Hemodynamics: Uneventful            Sign Out: Acceptable CV status; No obvious hypovolemia; No obvious fluid overload   Other NRE: NONE   DID A NON-ROUTINE EVENT OCCUR? No           Last vitals:  Vitals Value Taken Time   BP 99/65 08/09/22 0810   Temp 98.2  F (36.8  C) 08/09/22 0757   Pulse 55 08/09/22 0824   Resp 20 08/09/22 0824   SpO2 97 % 08/09/22 0824   Vitals shown include unvalidated device data.    Electronically Signed By: Obi Hernandez MD  August 9, 2022  11:58 AM

## 2022-08-09 NOTE — ANESTHESIA PREPROCEDURE EVALUATION
Anesthesia Pre-Procedure Evaluation    Patient: Ximena Diaz   MRN: [id number] : 2000        Procedure : Procedure(s):  CYSTOSCOPY, RIGHT RETROGRADE PYELOGRAM, RIGHT URETEROSCOPY WITH LASER LITHOTRIPSY AND BASKET REMOVAL OF STONE, RIGHT URETERAL STENT PLACEMENT          History reviewed. No pertinent past medical history.   Past Surgical History:   Procedure Laterality Date     COMBINED CYSTOSCOPY, URETEROSCOPY, LASER HOLMIUM LITHOTRIPSY URETER(S) Right 2020    Procedure: CYSTOSCOPY, RIGHT RETROGRADE PYELOGRAM, RIGHT URETEROSCOPY AND BASKET REMOVAL OF STONE, Balloon [place],RIGHT URETERAL STENT PLACEMENT;  Surgeon: Chago Fontanez MD;  Location: [facility] OR     ESOPHAGOSCOPY, GASTROSCOPY, DUODENOSCOPY (EGD), COMBINED N/A 2016    Procedure: COMBINED ESOPHAGOSCOPY, GASTROSCOPY, DUODENOSCOPY (EGD);  Surgeon: Arnav Schmid MD;  Location: [facility] OR     ESOPHAGOSCOPY, GASTROSCOPY, DUODENOSCOPY (EGD), COMBINED N/A 2016    Procedure: COMBINED ESOPHAGOSCOPY, GASTROSCOPY, DUODENOSCOPY (EGD), BIOPSY SINGLE OR MULTIPLE;  Surgeon: Arnav Schmid MD;  Location: [facility] OR     NO HISTORY OF SURGERY       TONSILLECTOMY, ADENOIDECTOMY, COMBINED Bilateral 3/25/2019    Procedure: BILATERAL  TONSILLECTOMY;  Surgeon: Giovanni Travis MD;  Location: [facility] OR      Allergies   Allergen Reactions     Nuts Anaphylaxis     No Clinical Screening - See Comments Rash     Nickel       Social History     Tobacco Use     Smoking status: Never Smoker     Smokeless tobacco: Never Used   Substance Use Topics     Alcohol use: Yes     Comment: on weekends      Wt Readings from Last 1 Encounters:   22 114.7 kg (252 lb 12.8 oz)        Anesthesia Evaluation   Pt has had prior anesthetic.     History of anesthetic complications  - PONV.  prior severe PONV, has not used scopolamine in past.    ROS/MED HX  ENT/Pulmonary:       Neurologic:    (-) no seizures and no CVA   Cardiovascular:    (-) hypertension and murmur   METS/Exercise Tolerance:  >4 METS    Hematologic:    (-) anemia   Musculoskeletal:       GI/Hepatic:     (+) GERD, Asymptomatic on medication,     Renal/Genitourinary:     (+) Nephrolithiasis ,     Endo:     (+) Obesity,  (-) Type II DM   Psychiatric/Substance Use:       Infectious Disease:       Malignancy:    (-) malignancy   Other:            Physical Exam    Airway        Mallampati: II   TM distance: > 3 FB   Neck ROM: full   Mouth opening: > 3 cm    Respiratory Devices and Support         Dental  no notable dental history         Cardiovascular          Rhythm and rate: regular and normal (-) no murmur    Pulmonary   pulmonary exam normal        breath sounds clear to auscultation           OUTSIDE LABS:  CBC:   Lab Results   Component Value Date    WBC 13.9 (H) 07/19/2022    WBC 11.2 (H) 08/18/2021    HGB 15.0 07/19/2022    HGB 14.6 08/18/2021    HCT 45.8 07/19/2022    HCT 44.4 08/18/2021     07/19/2022     08/18/2021     BMP:   Lab Results   Component Value Date     07/19/2022     08/18/2021    POTASSIUM 3.8 07/19/2022    POTASSIUM 3.9 08/18/2021    CHLORIDE 105 07/19/2022    CHLORIDE 107 08/18/2021    CO2 20 (L) 07/19/2022    CO2 27 08/18/2021    BUN 12.4 07/19/2022    BUN 7 08/18/2021    CR 0.94 07/19/2022    CR 0.92 08/18/2021    GLC 87 07/19/2022    GLC 82 08/18/2021     COAGS: No results found for: PTT, INR, FIBR  POC:   Lab Results   Component Value Date    HCG Negative 08/09/2022    HCGS Negative 07/17/2020     HEPATIC:   Lab Results   Component Value Date    ALBUMIN 4.8 07/19/2022    PROTTOTAL 8.4 (H) 07/19/2022    ALT 22 07/19/2022    AST 23 07/19/2022    ALKPHOS 96 07/19/2022    BILITOTAL 0.4 07/19/2022     OTHER:   Lab Results   Component Value Date    PH 7.35 08/17/2021    LACT 0.7 07/20/2022    NIKI 9.8 07/19/2022    LIPASE 21 07/19/2022    TSH 1.79 02/15/2016    SED 9 02/15/2016       Anesthesia Plan    ASA Status:  2   NPO Status:  NPO Appropriate    Anesthesia Type: General.   Induction:  Intravenous, Propofol.   Maintenance: Balanced.        Consents    Anesthesia Plan(s) and associated risks, benefits, and realistic alternatives discussed. Questions answered and patient/representative(s) expressed understanding.    - Discussed:     - Discussed with:  Patient      - Specific Concerns: sore throat, post op pain/nausea, dental damage, rare major complications.     - Extended Intubation/Ventilatory Support Discussed: No.      - Patient is DNR/DNI Status: No    Use of blood products discussed: No .     Postoperative Care    Pain management: IV analgesics, Oral pain medications, Multi-modal analgesia.   PONV prophylaxis: Ondansetron (or other 5HT-3), Dexamethasone or Solumedrol, Background Propofol Infusion     Comments:    Other Comments: Multiple prior of the same procedure. Has had bad PONV in past, has not used scopolamine so will try that and TIVA today and see if improved.        H&P reviewed: Unable to attach H&P to encounter due to EHR limitations. H&P Update: appropriate H&P reviewed, patient examined. No interval changes since H&P (within 30 days).         Obi Hernandez MD

## 2022-08-09 NOTE — ANESTHESIA CARE TRANSFER NOTE
Patient: Ximena Diaz    Procedure: Procedure(s):  CYSTOSCOPY, RIGHT RETROGRADE PYELOGRAM, RIGHT URETEROSCOPY WITH LASER LITHOTRIPSY AND BASKET REMOVAL OF STONE, RIGHT URETERAL STENT PLACEMENT       Diagnosis: Right kidney stone [N20.0]  Diagnosis Additional Information: No value filed.    Anesthesia Type:   No value filed.     Note:    Oropharynx: oropharynx clear of all foreign objects  Level of Consciousness: awake  Oxygen Supplementation: nasal cannula  Level of Supplemental Oxygen (L/min / FiO2): 2  Independent Airway: airway patency satisfactory and stable  Dentition: dentition unchanged  Vital Signs Stable: post-procedure vital signs reviewed and stable  Report to RN Given: handoff report given  Patient transferred to: PACU  Comments: Anesthesia Care Transfer Note    Patient: Ximena Diaz    Transferred to: PACU with supplemental O2    Patient vital signs: stable    Airway: none    Monitors on, VSS, breathing spontaneously, report to RN      Vidya Leyva CRNA   8/9/2022 8:00 AM  Handoff Report: Identifed the Patient, Identified the Reponsible Provider, Reviewed the pertinent medical history, Discussed the surgical course, Reviewed Intra-OP anesthesia mangement and issues during anesthesia, Set expectations for post-procedure period and Allowed opportunity for questions and acknowledgement of understanding      Vitals:  Vitals Value Taken Time   BP     Temp     Pulse     Resp     SpO2         Electronically Signed By: JESICA French CRNA  August 9, 2022  8:00 AM

## 2022-08-09 NOTE — OP NOTE
OPERATIVE REPORT  DATE OF SURGERY: 08/09/22  LOCATION OF SURGERY: [facility]  PREOPERATIVE DIAGNOSIS:  (N20.0) Right kidney stone  (primary encounter diagnosis)  POSTOPERATIVE DIAGNOSIS: (N20.0) Right kidney stone  (primary encounter diagnosis)    START TIME: 7:20 AM  END TIME: 7:48 AM    PROCEDURE PERFORMED:   1. Cystoscopy  2. RIGHT retrograde pyelogram  3. RIGHT ureteroscopy with laser lithotripsy  4. RIGHT ureteroscopy with basketing of stones  5. RIGHT JJ stent placement  6. <1hr physician fluoroscopy time      STAFF SURGEON: Chago Fontanez MD  ANESTHESIA: General.   ESTIMATED BLOOD LOSS: 2 mL.   DRAINS AND TUBES: RIGHT 6fr x 24cm ureteral stent, 16fr moody catheter  COMPLICATIONS: None.   DISPOSITION: PACU.   SPECIMENS OBTAINED:   ID Type Source Tests Collected by Time Destination   A : RIGHT KIDNEY STONES Calculus/Stone Kidney, Right STONE ANALYSIS Chago Fontanez MD 8/9/2022  7:45 AM      SIGNIFICANT FINDINGS: Cystoscopy with no evidence of stone in the bladder.  Right retrograde pyelogram with no evidence of stone in the ureter.  Right ureteroscopy with evidence of lower pole stone as well as a small stone adherent to a papilla.  Stones fragmented and removed with a halo basket.  Right ureteral stent placed.     HISTORY OF PRESENT ILLNESS: Ximena Diaz is a 21 year old female with a history of nephrolithiasis with recent development of right-sided flank pain.  CT scan with a 4 mm right lower pole stone.  She was counseled on treatment options and that nonobstructive stones generally do not cause symptoms.  We discussed that stone removal may not change her current symptoms.  She elected to proceed with the above surgery.    OPERATION PERFORMED:   Informed consent was obtained and the patient was brought to the operating room where general anesthesia was induced. The patient was given appropriate preoperative antibiotics and positioned supine. The patient was then repositioned in dorsal lithotomy with  all pressure points padded. We then performed a timeout, verifying the correct patient's site and procedure to be performed.    A 22 Romansh cystoscope was inserted atraumatically into the bladder.  There was no evidence of stones in the bladder on cystoscopy.  The right ureteral orifice was identified and cannulated with a 0.035 sensor wire which was advanced up to the renal pelvis under fluoroscopic guidance and the cystoscope was removed.  A semirigid ureteroscope was assembled and inserted atraumatically into the bladder.  Using Amplatz superstiff wire the ureteral orifice was cannulated using a railroad technique and the scope was advanced up to the distal and mid ureter.  There was no evidence of ureteral stricture.  A gentle retrograde pyelogram was performed with no evidence of stones in the ureter.  Amplatz Super Stiff wire was advanced up to the renal pelvis and the semirigid ureteroscope was removed.  An 11-13 x 36 cm ureteral access sheath was advanced over the wire, however would not advance beyond the distal ureter.  The inner stylette and wire were removed.  The flexible ureteroscope was advanced and was able to advance easily up to the renal pelvis, again with no evidence of ureteral stricture.  Complete pyeloscopy was performed with evidence of the 4 mm stone and a lower/mid pole calyx as well as a small 1 to 2 mm stone adherent and partially buried within a papilla in the lower pole.  Stone was fragmented with holmium laser lithotripsy and the lower pole stone was fragmented as well.  All stone fragments were basketed and removed with a halo basket.  The ureteroscope was removed with complete visualization of the ureter and the access sheath was removed as well.  There is no evidence of ureteral injury.  A 6 Romansh by 24 cm JJ ureteral stent was advanced over the wire with good curl noted in the renal pelvis fluoroscopically and in the bladder fluoroscopically.  A 16 Romansh Downey catheter was  placed.  She received 20 mg IV Lasix and 30 mg IV Toradol.  She was emerged from anesthesia and taken to the PACU in stable condition.    Chart documentation with Dragon Voice recognition Software. Although reviewed after completion, some words and grammatical errors may remain.     Chgao Fontanez MD   Urology  [facility]  Clinic Phone [phone]

## 2022-08-11 LAB
APPEARANCE STONE: NORMAL
COMPN STONE: NORMAL
SPECIMEN WT: 16 MG

## 2022-08-16 ENCOUNTER — NURSE TRIAGE (OUTPATIENT)
Dept: NURSING | Facility: CLINIC | Age: 22
End: 2022-08-16

## 2022-08-16 ENCOUNTER — MEDICAL CORRESPONDENCE (OUTPATIENT)
Dept: HEALTH INFORMATION MANAGEMENT | Facility: CLINIC | Age: 22
End: 2022-08-16

## 2022-08-16 ENCOUNTER — OFFICE VISIT (OUTPATIENT)
Dept: UROLOGY | Facility: CLINIC | Age: 22
End: 2022-08-16
Payer: COMMERCIAL

## 2022-08-16 DIAGNOSIS — Z46.6 ENCOUNTER FOR REMOVAL OF URETERAL STENT: ICD-10-CM

## 2022-08-16 DIAGNOSIS — N20.0 RIGHT KIDNEY STONE: Primary | ICD-10-CM

## 2022-08-16 PROCEDURE — 52310 CYSTOSCOPY AND TREATMENT: CPT | Performed by: UROLOGY

## 2022-08-16 RX ORDER — CIPROFLOXACIN 500 MG/1
500 TABLET, FILM COATED ORAL ONCE
Status: COMPLETED | OUTPATIENT
Start: 2022-08-16 | End: 2022-08-16

## 2022-08-16 RX ORDER — ONDANSETRON 4 MG/1
4 TABLET, ORALLY DISINTEGRATING ORAL EVERY 8 HOURS PRN
Qty: 10 TABLET | Refills: 0 | Status: SHIPPED | OUTPATIENT
Start: 2022-08-16 | End: 2022-12-12

## 2022-08-16 RX ADMIN — CIPROFLOXACIN 500 MG: 500 TABLET, FILM COATED ORAL at 11:03

## 2022-08-16 NOTE — PROGRESS NOTES
Ximena Diaz's goals for this visit include:   Chief Complaint   Patient presents with     Cystoscopy     Stent removal        She requests these members of her care team be copied on today's visit information:     PCP: Curtis Holley    Referring Provider:  Chago Fontanez MD  [address] [address]    There were no vitals taken for this visit.    Do you need any medication refills at today's visit?     Mirtha Rivera LPN on 8/16/2022 at 10:59 AM

## 2022-08-16 NOTE — PROGRESS NOTES
CYSTOSCOPY AND URETERAL STENT REMVOAL PROCEDURE NOTE:    Ximena Diaz is a 21 year old female who presents with ureteral stent  for a cystoscopy and ureteral stent removal.    Pt ID verified with patient: Yes     Procedure verified with patient: Yes     Procedure confirmed with physician and support staff: Yes     Consent confirmed with physician and support staff.    Sign In:  History and Physical Exam reviewed   Primary Diagnosis: ureteral stent   Informed Consent Discussed: Yes   Sign in Communication: Yes   Time Out:  Team Confirms the Correct Patient, Correct Procedure; Yes , Correct Site and Site Marking, Correct Position (if applicable).  Affirmation of Time Out: Yes   Sign Out:  Sign Out Discussion: Yes   Ximena Diaz is a 21 year old female with an indwelling ureteral stent in need of removal.    CYSTOSCOPY PROCEDURE:  After sterile preparation and draping of the patient,  a 17-Citizen of [place] flexible cystoscope was introduced via the urethra.  It was passed without difficulty into the bladder.  The urethra was open without evidence of stricture.  The ureteral orifices were orthotopic.  The double J stent was seen coming out the right side.  It was grasped with an alligator forceps and extracted intact without difficulty.  The patient tolerated the procedure well    A/P Successful stent removal  Prophylactic antibiotic ordered   Stone prevention counseling provided today  - We discussed that given her young age and recurrent stone formation I would recommend a metabolic work-up and follow-up with my nephrology colleagues to discuss kidney stone prevention  - Order for nephrology consult placed  - Order for 2 24-hour Litholink placed    Watch for any new onset fevers, signs of UTI.  May expect some pain after removal.  If this is severe, or last many hours, you may need to return for replacement of stent.    Chago Fontanez MD   Urology  [facility]

## 2022-08-16 NOTE — TELEPHONE ENCOUNTER
"Discussed with Dr. Fontanez who reviewed note below. Dr. Fontanez recommends for patient to take tylenol and ibuprofen as needed. Patient should also continue flomax and can take oxycodone as needed if she has any remaining tablets. Per Dr. Fontanez, patient should continue to drink water and symptoms should improve in the next day or two.     Called and spoke to patient who is aware of the above information. Patient reports 10/10 right flank pain. Patient stated, \"This pain is so unbearable. Nothing is helping. I can't walk. I can't sit. It hurts to breathe.\" Patient reports that she took flomax, extra strength tylenol, 1 oxycodone with no improvement in pain. Per patient, she took zofran, has not vomited and is able to drink water. Patient reports that she is voiding blood-tinged urine. Patient reports that she tried a heating pad and a bath with no improvement. Per patient, she has 5-6 tablets of oxycodone remaining. Informed patient that writer will discuss this further with Dr. Fontanez and then get back to her as soon as possible. Patient was comfortable with plan.    Milagros Jade RN, BSN            "

## 2022-08-16 NOTE — LETTER
Patient:  Ximena Diaz  :   2000  MRN:     [id number]        Ms.Madalyn VICENTE Diaz  [address] [facility] [address]        2022    Peg Rees    At your appointment earlier with Dr. Fontanez it was discussed that you will need to complete two 24 hour urine collection. We have mailed you a a form called [employer] to your address on file. Please call the highlighted number at the bottom of the form to receive the kits. Per your appointment with  would like you to complete these in two to three months.      Thank you   Your Urology Care Team

## 2022-08-16 NOTE — TELEPHONE ENCOUNTER
Pt calls red flag triage. Had stent removed today at  urology ,  Chago Fontanez MD. Pain 10/10., has spasms.Took 1 oxycondone from previous procedure. Pain continues. Has bleeding when urinating. Sx began about 1 hr after she got home, worsening throughout day. Uses [facility].  LM on   Urology nurse line. Note to follow. Pt can be reached at .PLease call for pain mnt .    [facility] tele triage  Protocol. Post op problems. Pg [phone].

## 2022-08-16 NOTE — TELEPHONE ENCOUNTER
Received call from Elisabeth Spain RN regarding note below. Informed Elisabeth that Dr. Fontanez is currently in a procedure but will discuss this with him as soon as possible and contact the patient.     Milagros Jade RN, BSN

## 2022-08-16 NOTE — PATIENT INSTRUCTIONS
"After Your Cystoscopy    What happens after the exam?    You may go back to your normal diet and activity as you feel ready, unless your doctor tells you not to.    For the next two days, you may notice:    Some blood in your urine  Some burning when you urinate (use the toilet)  An urge to urinate more often  Bladder spasms    These are normal after the procedure and should go away after a day or two.  To relieve these problems drink 6 to 8 large glasses of water each day (includes drinks at meals) as this will help clear the urine.  Take warm baths to relieve pain and bladder spasms.  Do not add anything to the bath water.  You may also take Tylenol (acetaminophen) for pain if needed.    When should I call my doctor?    A fever over 100F (38C) for more than a day. (Before you call the doctor, check your temperature under your tongue)  Chills  Failure to urinate: No urine comes out when you try to use the toilet. (Try soaking in a bathtub full of warm water. If still no urine, call your doctor)  A lot of blood in the urine, or blood clots larger than a nickel  Pain in the back or belly area (abdomen)  Pain or spasms that are not relieved by warm tub baths and pain medicine  Severe pain, burning or other problems while passing urine  Pain that gets worse after two days           AFTER YOUR CYSTOSCOPY        You have just completed a cystoscopy, or \"cysto\", which allowed your physician to learn more about your bladder (or to remove a stent placed after surgery). We suggest that you continue to avoid caffeine, fruit juice, and alcohol for the next 24 hours, however, you are encouraged to return to your normal activities.         A few things that are considered normal after your cystoscopy:     * Small amount of bleeding (or spotting) that clears within the next 24 hours     * Slight burning sensation with urination     * Sensation to of needing to avoid more frequently     * The feeling of \"air\" in your urine     * " Mild discomfort that is relieved with Tylenol        Please contact our office promptly if you:     * Develop a fever above 101 degrees     * Are unable to urinate     * Develop bright red blood that does not stop     * Severe pain or swelling         Please contact our office with any concerns or questions @[facility].

## 2022-08-16 NOTE — TELEPHONE ENCOUNTER
Discussed note below with Dr. Fontanez who reports that if pain is severe and uncontrolled at home and if oxycodone is not helping, patient could be seen in the ER for further evaluation. Per Dr. Fontanez, they may need to do imaging and there is the possibility of the stent needing to be replaced. Per Dr. Fontanez, if patient does seek care at an ER, the [facility] ER is recommended as Dr. Fontanez is on call tonight.    Called and spoke to patient who is aware of the above information. Patient verbalized understanding and was grateful for the call.    Milagros Jade RN, BSN

## 2022-09-14 ENCOUNTER — ALLIED HEALTH/NURSE VISIT (OUTPATIENT)
Dept: FAMILY MEDICINE | Facility: CLINIC | Age: 22
End: 2022-09-14
Payer: COMMERCIAL

## 2022-09-14 DIAGNOSIS — Z30.9 CONTRACEPTIVE MANAGEMENT: Primary | ICD-10-CM

## 2022-09-14 PROCEDURE — 96372 THER/PROPH/DIAG INJ SC/IM: CPT | Performed by: NURSE PRACTITIONER

## 2022-09-14 RX ADMIN — MEDROXYPROGESTERONE ACETATE 150 MG: 150 INJECTION, SUSPENSION INTRAMUSCULAR at 15:36

## 2022-09-14 NOTE — NURSING NOTE
BP: Data Unavailable    LAST PAP/EXAM: No results found for: PAP  URINE HCG:not indicated    The following medication was given:     MEDICATION: Depo Provera 150mg  ROUTE: IM  SITE: [facility]  : [employer]  LOT #: [id number]  EXP:04/01/2024  NDC: 56710-626-38  NEXT INJECTION DUE: 11/30/22 - 12/14/22   Provider: Curtis RENTERIA  Pt was given reminder card and told to wait 15-20 mins Jorden Leonardo MA

## 2022-09-22 ENCOUNTER — MYC MEDICAL ADVICE (OUTPATIENT)
Dept: UROLOGY | Facility: CLINIC | Age: 22
End: 2022-09-22

## 2022-09-27 NOTE — CONFIDENTIAL NOTE
FMLA form signed by Dr. Fontanez and successfully faxed to patient's employer ([employer]) at fax# [phone].    Milagros Jade RN, BSN

## 2022-09-30 ENCOUNTER — TRANSFERRED RECORDS (OUTPATIENT)
Dept: HEALTH INFORMATION MANAGEMENT | Facility: CLINIC | Age: 22
End: 2022-09-30

## 2022-10-04 ENCOUNTER — MEDICAL CORRESPONDENCE (OUTPATIENT)
Dept: HEALTH INFORMATION MANAGEMENT | Facility: CLINIC | Age: 22
End: 2022-10-04

## 2022-10-14 ENCOUNTER — VIRTUAL VISIT (OUTPATIENT)
Dept: FAMILY MEDICINE | Facility: OTHER | Age: 22
End: 2022-10-14
Payer: COMMERCIAL

## 2022-10-14 DIAGNOSIS — Z91.199 NO-SHOW FOR APPOINTMENT: Primary | ICD-10-CM

## 2022-10-14 ASSESSMENT — PATIENT HEALTH QUESTIONNAIRE - PHQ9
SUM OF ALL RESPONSES TO PHQ QUESTIONS 1-9: 13
10. IF YOU CHECKED OFF ANY PROBLEMS, HOW DIFFICULT HAVE THESE PROBLEMS MADE IT FOR YOU TO DO YOUR WORK, TAKE CARE OF THINGS AT HOME, OR GET ALONG WITH OTHER PEOPLE: EXTREMELY DIFFICULT
SUM OF ALL RESPONSES TO PHQ QUESTIONS 1-9: 13

## 2022-10-14 NOTE — PROGRESS NOTES
"Negrita is a 22 year old who is being evaluated via a billable video visit.      How would you like to obtain your AVS? {AVS Preference:813138}  If the video visit is dropped, the invitation should be resent by: {video visit invitation (Optional) :154990}  Will anyone else be joining your video visit? {:839412}  {If patient encounters technical issues they should call [phone] :795630}        {PROVIDER CHARTING PREFERENCE:453305}    Subjective   Negrita is a 22 year old{ACCOMPANIED BY STATEMENT (Optional):045871}, presenting for the following health issues:  No chief complaint on file.      History of Present Illness       Reason for visit:  Sleeping meds  Symptom onset:  More than a month  Symptom intensity:  Severe  Symptom progression:  Worsening  Had these symptoms before:  Yes  Has tried/received treatment for these symptoms:  No    She eats 2-3 servings of fruits and vegetables daily.She consumes 3 sweetened beverage(s) daily.She exercises with enough effort to increase her heart rate 60 or more minutes per day.  She exercises with enough effort to increase her heart rate 4 days per week. She is missing 3 dose(s) of medications per week.  She is not taking prescribed medications regularly due to remembering to take.    Today's PHQ-9         PHQ-9 Total Score: 13    PHQ-9 Q9 Thoughts of better off dead/self-harm past 2 weeks :   Not at all    How difficult have these problems made it for you to do your work, take care of things at home, or get along with other people: Extremely difficult       {SUPERLIST (Optional):883611}  {additonal problems for provider to add (Optional):673617}    Review of Systems   {ROS COMP (Optional):169300}      Objective           Vitals:  No vitals were obtained today due to virtual visit.    Physical Exam   {video visit exam brief selected:787576::\"GENERAL: Healthy, alert and no distress\",\"EYES: Eyes grossly normal to inspection.  No discharge or erythema, or obvious " "scleral/conjunctival abnormalities.\",\"RESP: No audible wheeze, cough, or visible cyanosis.  No visible retractions or increased work of breathing.  \",\"SKIN: Visible skin clear. No significant rash, abnormal pigmentation or lesions.\",\"NEURO: Cranial nerves grossly intact.  Mentation and speech appropriate for age.\",\"PSYCH: Mentation appears normal, affect normal/bright, judgement and insight intact, normal speech and appearance well-groomed.\"}    {Diagnostic Test Results (Optional):607768}    {AMBULATORY ATTESTATION (Optional):901719}        Video-Visit Details    Video Start Time: {video visit start/end time for provider to select:975420}    Type of service:  Video Visit    Video End Time:{video visit start/end time for provider to select:928438}    Originating Location (pt. Location): {video visit patient location:845415::\"Home\"}    {PROVIDER LOCATION On-site should be selected for visits conducted from your clinic location or adjoining [facility] hospital, academic office, or other nearby [facility] building. Off-site should be selected for all other provider locations, including home:322531}    Distant Location (provider location):  {virtual location provider:845144}    Platform used for Video Visit: {Virtual Visit Platforms:960581::\"Loom\"}    "

## 2022-11-25 ENCOUNTER — E-VISIT (OUTPATIENT)
Dept: FAMILY MEDICINE | Facility: CLINIC | Age: 22
End: 2022-11-25
Payer: COMMERCIAL

## 2022-11-25 DIAGNOSIS — G47.9 SLEEP DISTURBANCE: Primary | ICD-10-CM

## 2022-11-25 PROCEDURE — 99207 PR NO BILLABLE SERVICE THIS VISIT: CPT | Performed by: PHYSICIAN ASSISTANT

## 2022-12-12 ENCOUNTER — OFFICE VISIT (OUTPATIENT)
Dept: URGENT CARE | Facility: URGENT CARE | Age: 22
End: 2022-12-12
Payer: COMMERCIAL

## 2022-12-12 VITALS
HEART RATE: 93 BPM | TEMPERATURE: 97.6 F | SYSTOLIC BLOOD PRESSURE: 122 MMHG | RESPIRATION RATE: 20 BRPM | BODY MASS INDEX: 41.27 KG/M2 | OXYGEN SATURATION: 100 % | WEIGHT: 233 LBS | DIASTOLIC BLOOD PRESSURE: 79 MMHG

## 2022-12-12 DIAGNOSIS — J02.9 SORE THROAT: ICD-10-CM

## 2022-12-12 DIAGNOSIS — J06.9 VIRAL URI: Primary | ICD-10-CM

## 2022-12-12 LAB
DEPRECATED S PYO AG THROAT QL EIA: NEGATIVE
FLUAV AG SPEC QL IA: NEGATIVE
FLUBV AG SPEC QL IA: NEGATIVE
GROUP A STREP BY PCR: NOT DETECTED

## 2022-12-12 PROCEDURE — 87651 STREP A DNA AMP PROBE: CPT | Performed by: NURSE PRACTITIONER

## 2022-12-12 PROCEDURE — 99213 OFFICE O/P EST LOW 20 MIN: CPT | Performed by: NURSE PRACTITIONER

## 2022-12-12 PROCEDURE — 87804 INFLUENZA ASSAY W/OPTIC: CPT | Performed by: NURSE PRACTITIONER

## 2022-12-12 NOTE — PROGRESS NOTES
Assessment & Plan       1. Viral URI      2. Sore throat    - Influenza A & B Antigen - Clinic Collect  - Streptococcus A Rapid Screen w/Reflex to PCR - Clinic Collect  - Group A Streptococcus PCR Throat Swab  Pending strep culture  Symptoms likely viral in etiology recommend home care conservatively discussed this to include over-the-counter treatment with Mucinex or other cough treatment, discussed saline nasal lavage, may use warm salt water gargle for sore throat Cepacol throat spray and throat lozenges as well were discussed  Patient verbalized understanding  Letter given for work.    Eli Valdovinos, JESICA CNP  [facility] [place]    Emile Rees is a 22 year old female who presents to clinic today for the following health issues:  Chief Complaint   Patient presents with     Pharyngitis     Sx Sunday morning,      Cough     Sx Sunday      Sinus pressure and pressure behind ears     Sx Sunday and getting worse               work note     Pt would like a work note.       HPI    URI Adult    Onset of symptoms was 2 day(s) ago.  Course of illness is worsening.    Severity moderate  Current and Associated symptoms: sweats, runny nose, stuffy nose, cough - productive, bilateral ear pressure, sore throat, facial pain/pressure, headache, fatigue, nausea and vomiting  Treatment measures tried include Tylenol/Ibuprofen, Fluids and popsicles.  Predisposing factors include ill contact: Family member , seasonal allergies and HX of asthma.  Negative home COVID test.    Review of Systems  Constitutional, HEENT, cardiovascular, pulmonary, GI, , musculoskeletal, neuro, skin, endocrine and psych systems are negative, except as otherwise noted.      Objective    /79   Pulse 93   Temp 97.6  F (36.4  C) (Tympanic)   Resp 20   Wt 105.7 kg (233 lb)   SpO2 100%   BMI 41.27 kg/m       Physical Exam   GENERAL: healthy, alert and no distress  EYES: Eyes grossly normal to inspection, PERRL and  conjunctivae and sclerae normal  HENT: ear canals and TM's normal, nose and mouth without ulcers. Erythematous pharynx  NECK: bilateral posterior cervical adenopathy, no asymmetry, masses, or scars and thyroid normal to palpation  RESP: lungs clear to auscultation - no rales, rhonchi or wheezes  CV: regular rate and rhythm, normal S1 S2, no S3 or S4, no murmur, click or rub, no peripheral edema and peripheral pulses strong  ABDOMEN: soft, nontender, no hepatosplenomegaly, no masses and bowel sounds normal    Results for orders placed or performed in visit on 12/12/22 (from the past 24 hour(s))   Influenza A & B Antigen - Clinic Collect    Specimen: Nose; Swab   Result Value Ref Range    Influenza A antigen Negative Negative    Influenza B antigen Negative Negative    Narrative    Test results must be correlated with clinical data. If necessary, results should be confirmed by a molecular assay or viral culture.   Streptococcus A Rapid Screen w/Reflex to PCR - Clinic Collect    Specimen: Throat; Swab   Result Value Ref Range    Group A Strep antigen Negative Negative

## 2022-12-12 NOTE — LETTER
[facility]  [address] [address]  Phone: [phone]    December 12, 2022        Ximena Diaz  [address] [facility] [address]          To whom it may concern:    RE: Ximena Diaz    Patient was seen and treated today at our clinic diagnosed with viral upper respiratory infection. Pending strep culture. She will likely miss work an additional 3-7 days.     Please contact me for questions or concerns.      Sincerely,        Eli JESICA Cagle CNP

## 2022-12-13 NOTE — RESULT ENCOUNTER NOTE
Results discussed with patient in clinic. States understanding of these results.    Eli Valdovinos CNP

## 2022-12-14 ENCOUNTER — OFFICE VISIT (OUTPATIENT)
Dept: URGENT CARE | Facility: URGENT CARE | Age: 22
End: 2022-12-14
Payer: COMMERCIAL

## 2022-12-14 VITALS
DIASTOLIC BLOOD PRESSURE: 77 MMHG | BODY MASS INDEX: 41.27 KG/M2 | WEIGHT: 233 LBS | RESPIRATION RATE: 18 BRPM | SYSTOLIC BLOOD PRESSURE: 118 MMHG | OXYGEN SATURATION: 98 % | HEART RATE: 92 BPM | TEMPERATURE: 98.2 F

## 2022-12-14 DIAGNOSIS — H73.011 BULLOUS MYRINGITIS OF RIGHT EAR: ICD-10-CM

## 2022-12-14 DIAGNOSIS — H66.001 ACUTE SUPPURATIVE OTITIS MEDIA OF RIGHT EAR WITHOUT SPONTANEOUS RUPTURE OF TYMPANIC MEMBRANE, RECURRENCE NOT SPECIFIED: Primary | ICD-10-CM

## 2022-12-14 PROCEDURE — 99214 OFFICE O/P EST MOD 30 MIN: CPT | Performed by: FAMILY MEDICINE

## 2022-12-14 ASSESSMENT — PAIN SCALES - GENERAL: PAINLEVEL: MODERATE PAIN (4)

## 2022-12-14 NOTE — PROGRESS NOTES
Chief complaint: ear     3 days ago started with a bad sore throat and cough nasal congestion  Ears were plugged   Got progressively worsening  2 days ago came in strep was negative   Flu negative   Negative at home covid test     Has gotten worse   Especially throat hurts to swallow    Last night woke up and right ear was hurting    Denies any possibility of pregnancy declined a pregnancy test  Not breastfeeding      Problem list and histories reviewed & adjusted, as indicated.  Additional history: as documented    Problem list, Medication list, Allergies, and Medical/Social/Surgical histories reviewed in [facility] and updated as appropriate.    ROS:  Constitutional, HEENT, cardiovascular, pulmonary, gi and gu systems are negative, except as otherwise noted.    OBJECTIVE:                                                    /77   Pulse 92   Temp 98.2  F (36.8  C) (Tympanic)   Resp 18   Wt 105.7 kg (233 lb)   SpO2 98%   BMI 41.27 kg/m    Body mass index is 41.27 kg/m .  GENERAL: healthy, alert and no distress  EYES: pink palpebral conjunctiva, anicteric sclera, pupils equally reactive to light and accomodation, extraocular muscles intact full and equal.  ENT: midline nasal septum, positive  nasal congestion   Left ear:no tragal tenderness, no mastoid tenderness normal tympaninc membrane   Right ear: no tragal tenderness, no mastoid tenderness erythematous, bulging, bullae present and air/fluid interface tympaninc membrane   NECK: no adenopathy, no asymmetry or  masses  RESP: lungs clear to auscultation - no rales, rhonchi or wheezes  CV: regular rate and rhythm, normal S1 S2, no S3 or S4, no murmur, click or rub, no peripheral edema and peripheral pulses strong  MS: no gross musculoskeletal defects noted, no edema  NEURO: Normal strength and tone, mentation intact and speech normal    Diagnostic Test Results:  No results found for this or any previous visit (from the past 24 hour(s)).     ASSESSMENT/PLAN:                                                       No diagnosis found.      ICD-10-CM    1. Acute suppurative otitis media of right ear without spontaneous rupture of tympanic membrane, recurrence not specified  H66.001 amoxicillin-clavulanate (AUGMENTIN) 875-125 MG tablet      2. Bullous myringitis of right ear  H73.011         Prescribed with augmentin  Side effects discussed warned about GI side effects   Recommend follow up with primary care provider if no relief in 3 days, sooner if worse  Needs ear recheck with primary care provider in 2-4 weeks  Adverse reactions of medications discussed.  Over the counter medications discussed.   Aware to come back in if with worsening symptoms or if no relief despite treatment plan  Patient voiced understanding and had no further questions.     MD Tammy Garces MD  [facility] [place]

## 2022-12-19 ENCOUNTER — VIRTUAL VISIT (OUTPATIENT)
Dept: FAMILY MEDICINE | Facility: CLINIC | Age: 22
End: 2022-12-19
Payer: COMMERCIAL

## 2022-12-19 ENCOUNTER — ALLIED HEALTH/NURSE VISIT (OUTPATIENT)
Dept: FAMILY MEDICINE | Facility: CLINIC | Age: 22
End: 2022-12-19
Payer: COMMERCIAL

## 2022-12-19 DIAGNOSIS — Z30.9 CONTRACEPTIVE MANAGEMENT: Primary | ICD-10-CM

## 2022-12-19 DIAGNOSIS — H66.91 ACUTE OTITIS MEDIA, RIGHT: Primary | ICD-10-CM

## 2022-12-19 LAB — HCG UR QL: NEGATIVE

## 2022-12-19 PROCEDURE — 99213 OFFICE O/P EST LOW 20 MIN: CPT | Mod: 95 | Performed by: NURSE PRACTITIONER

## 2022-12-19 PROCEDURE — 96372 THER/PROPH/DIAG INJ SC/IM: CPT | Performed by: NURSE PRACTITIONER

## 2022-12-19 PROCEDURE — 99207 PR NO CHARGE NURSE ONLY: CPT

## 2022-12-19 PROCEDURE — 81025 URINE PREGNANCY TEST: CPT

## 2022-12-19 RX ORDER — CEFDINIR 300 MG/1
300 CAPSULE ORAL 2 TIMES DAILY
Qty: 20 CAPSULE | Refills: 0 | Status: SHIPPED | OUTPATIENT
Start: 2022-12-19 | End: 2022-12-29

## 2022-12-19 RX ADMIN — MEDROXYPROGESTERONE ACETATE 150 MG: 150 INJECTION, SUSPENSION INTRAMUSCULAR at 12:01

## 2022-12-19 NOTE — PROGRESS NOTES
Negrita is a 22 year old who is being evaluated via a billable video visit.      How would you like to obtain your AVS? MyChart  If the video visit is dropped, the invitation should be resent by: Text to cell phone: [phone]  Will anyone else be joining your video visit? No    Assessment & Plan     Acute otitis media, right  With no improvement with the Augmentin will transition to Cefdinir.  Alternate with Tylenol and Ibuprofen as needed for pain.  Use a decongestant.  Symptomatic care and follow up discussed.  - cefdinir (OMNICEF) 300 MG capsule; Take 1 capsule (300 mg) by mouth 2 times daily for 10 days      Return in about 3 days (around 12/22/2022), or if symptoms worsen or fail to improve.    JESICA Arvizu CNP  [facility]   Negrita is a 22 year old, presenting for the following health issues:  Ear Problem      History of Present Illness       Reason for visit:  Need new meds for ear infection    She eats 2-3 servings of fruits and vegetables daily.She consumes 2 sweetened beverage(s) daily.She exercises with enough effort to increase her heart rate 30 to 60 minutes per day.  She exercises with enough effort to increase her heart rate 4 days per week.   She is taking medications regularly.       ED/UC Followup:    Facility:  [facility]   Date of visit: 12/14/2022  Reason for visit: Ear Problem   Current Status: Pt feels right ear is worse. She stopped taking Augmentin yesterday because she feels this wasn't helping.     Ear is getting worse  Pain and pressure in right ear    Review of Systems   Constitutional, HEENT, cardiovascular, pulmonary, gi and gu systems are negative, except as otherwise noted.      Objective           Vitals:  No vitals were obtained today due to virtual visit.    Physical Exam   GENERAL: Healthy, alert and no distress  EYES: Eyes grossly normal to inspection.  No discharge or erythema, or obvious scleral/conjunctival  abnormalities.  RESP: No audible wheeze, cough, or visible cyanosis.  No visible retractions or increased work of breathing.    SKIN: Visible skin clear. No significant rash, abnormal pigmentation or lesions.  NEURO: Cranial nerves grossly intact.  Mentation and speech appropriate for age.  PSYCH: Mentation appears normal, affect normal/bright, judgement and insight intact, normal speech and appearance well-groomed.        Video-Visit Details    Video Start Time: 10:32 AM    Type of service:  Video Visit    Video End Time:10:35 AM    Originating Location (pt. Location): Home    Distant Location (provider location):  On-site    Platform used for Video Visit: [employer]

## 2022-12-19 NOTE — PROGRESS NOTES
Clinic Administered Medication Documentation          Depo Provera Documentation    URINE HCG: negative    Depo-Provera Standing Order inclusion/exclusion criteria reviewed.   Patient meets: inclusion criteria     BP: Data Unavailable  LAST PAP/EXAM: No results found for: PAP    Prior to injection, verified patient identity using patient's name and date of birth. Medication was administered. Please see MAR and medication order for additional information.     Was entire vial of medication used? Yes  Vial/Syringe: Single dose vial  Expiration Date:  4/1/24    Patient instructed to remain in clinic for 15 minutes.  NEXT INJECTION DUE: 3/6/23 - 3/20/23

## 2023-03-03 DIAGNOSIS — Z30.9 ENCOUNTER FOR CONTRACEPTIVE MANAGEMENT, UNSPECIFIED TYPE: Primary | ICD-10-CM

## 2023-03-03 RX ORDER — MEDROXYPROGESTERONE ACETATE 150 MG/ML
150 INJECTION, SUSPENSION INTRAMUSCULAR
Status: ACTIVE | OUTPATIENT
Start: 2023-03-03 | End: 2024-02-26

## 2023-03-27 DIAGNOSIS — N20.0 KIDNEY STONE: ICD-10-CM

## 2023-03-27 DIAGNOSIS — R39.15 URGENCY OF URINATION: Primary | ICD-10-CM

## 2023-03-27 DIAGNOSIS — R35.0 URINARY FREQUENCY: ICD-10-CM

## 2023-03-27 DIAGNOSIS — R10.9 FLANK PAIN: ICD-10-CM

## 2023-03-27 NOTE — TELEPHONE ENCOUNTER
Negrita Diaz  Patient Appointment Schedule Request Pool 4 days ago       Appointment Request From: Ximena Diaz     With Provider: Chago Fontanez MD [[facility]]     Preferred Date Range: 3/24/2023 - 3/25/2023     Preferred Times: Any Time     Reason for visit: Request an Appointment     Comments:  Kidney stones are back     Received the above scheduling request via my chart from patient. Patient is scheduled for follow up visit with Dr. Fontanez in June 2023. Message sent to triage RN to further discuss kidney stones.    Milagros Jade RN, BSN

## 2023-03-27 NOTE — TELEPHONE ENCOUNTER
"Spoke to pt. Pt reports having symptoms due to kidney stones. She reports pain, frequency urination, \"normal symptoms I always have when I have kidney stones\". Pain noted to right flank, constant and tolerable, but at times gets severe where she states it hurts to move and is in tears. Did not give a rating. Able to urinate ok otherwise. No fever. No hematuria. Noted increased urgency. No nausea or vomiting. Chart and problems list reviewed. Pt is taking advil and tylenol for the pain. Also using heating pad. Pt to get UA/UC.     Ananya Chi, MSN RN    Orders Placed This Encounter   Procedures     Routine UA with microscopic - No culture     Standing Status:   Future     Standing Expiration Date:   3/27/2024     Urine Culture Aerobic Bacterial     Standing Status:   Future     Standing Expiration Date:   3/27/2024         "

## 2023-03-27 NOTE — TELEPHONE ENCOUNTER
Attempted to reach pt.  Left detailed message to call clinic back at [phone].   To discuss symptoms.     Ananya Chi, MSN RN

## 2023-03-29 ENCOUNTER — MYC MEDICAL ADVICE (OUTPATIENT)
Dept: UROLOGY | Facility: CLINIC | Age: 23
End: 2023-03-29
Payer: COMMERCIAL

## 2023-03-29 DIAGNOSIS — N39.0 URINARY TRACT INFECTION WITHOUT HEMATURIA, SITE UNSPECIFIED: Primary | ICD-10-CM

## 2023-03-29 NOTE — TELEPHONE ENCOUNTER
Giovanni Velasco MD Bratsch, Ananya AGUIAR, RN  Caller: Unspecified (2 days ago, 10:17 AM)  OK for CT and Flomax   Thank You   STUART     Attempted to reach pt.  Left detailed message to call clinic back at [phone].   Sent App[employer] message.     Ananya Chi, MSN RN

## 2023-03-31 RX ORDER — TAMSULOSIN HYDROCHLORIDE 0.4 MG/1
0.4 CAPSULE ORAL DAILY
Qty: 30 CAPSULE | Refills: 0 | Status: SHIPPED | OUTPATIENT
Start: 2023-03-31

## 2023-04-05 ENCOUNTER — ANCILLARY PROCEDURE (OUTPATIENT)
Dept: CT IMAGING | Facility: CLINIC | Age: 23
End: 2023-04-05
Attending: UROLOGY
Payer: COMMERCIAL

## 2023-04-05 DIAGNOSIS — R10.9 FLANK PAIN: ICD-10-CM

## 2023-04-05 DIAGNOSIS — R35.0 URINARY FREQUENCY: ICD-10-CM

## 2023-04-05 DIAGNOSIS — R39.15 URGENCY OF URINATION: ICD-10-CM

## 2023-04-05 DIAGNOSIS — N20.0 KIDNEY STONE: ICD-10-CM

## 2023-04-05 PROCEDURE — 74176 CT ABD & PELVIS W/O CONTRAST: CPT | Mod: TC | Performed by: RADIOLOGY

## 2023-04-13 ENCOUNTER — LAB (OUTPATIENT)
Dept: LAB | Facility: CLINIC | Age: 23
End: 2023-04-13
Payer: COMMERCIAL

## 2023-04-13 DIAGNOSIS — R35.0 URINARY FREQUENCY: ICD-10-CM

## 2023-04-13 DIAGNOSIS — R39.15 URGENCY OF URINATION: ICD-10-CM

## 2023-04-13 DIAGNOSIS — R10.9 FLANK PAIN: ICD-10-CM

## 2023-04-13 LAB
ALBUMIN UR-MCNC: NEGATIVE MG/DL
APPEARANCE UR: ABNORMAL
BACTERIA #/AREA URNS HPF: ABNORMAL /HPF
BILIRUB UR QL STRIP: NEGATIVE
COLOR UR AUTO: YELLOW
GLUCOSE UR STRIP-MCNC: NEGATIVE MG/DL
HGB UR QL STRIP: ABNORMAL
KETONES UR STRIP-MCNC: NEGATIVE MG/DL
LEUKOCYTE ESTERASE UR QL STRIP: ABNORMAL
NITRATE UR QL: POSITIVE
PH UR STRIP: 6 [PH] (ref 5–7)
RBC #/AREA URNS AUTO: ABNORMAL /HPF
SP GR UR STRIP: 1.02 (ref 1–1.03)
SQUAMOUS #/AREA URNS AUTO: ABNORMAL /LPF
UROBILINOGEN UR STRIP-ACNC: 0.2 E.U./DL
WBC #/AREA URNS AUTO: ABNORMAL /HPF
WBC CLUMPS #/AREA URNS HPF: PRESENT /HPF

## 2023-04-13 PROCEDURE — 87086 URINE CULTURE/COLONY COUNT: CPT

## 2023-04-13 PROCEDURE — 87186 SC STD MICRODIL/AGAR DIL: CPT

## 2023-04-13 PROCEDURE — 81001 URINALYSIS AUTO W/SCOPE: CPT

## 2023-04-14 LAB — BACTERIA UR CULT: ABNORMAL

## 2023-04-14 RX ORDER — CEPHALEXIN 500 MG/1
500 CAPSULE ORAL 2 TIMES DAILY
Qty: 14 CAPSULE | Refills: 0 | Status: SHIPPED | OUTPATIENT
Start: 2023-04-14 | End: 2023-04-21

## 2023-04-15 ENCOUNTER — HEALTH MAINTENANCE LETTER (OUTPATIENT)
Age: 23
End: 2023-04-15

## 2023-04-18 ENCOUNTER — VIRTUAL VISIT (OUTPATIENT)
Dept: UROLOGY | Facility: CLINIC | Age: 23
End: 2023-04-18
Payer: COMMERCIAL

## 2023-04-18 DIAGNOSIS — M54.9 BILATERAL BACK PAIN, UNSPECIFIED BACK LOCATION, UNSPECIFIED CHRONICITY: Primary | ICD-10-CM

## 2023-04-18 PROCEDURE — 99213 OFFICE O/P EST LOW 20 MIN: CPT | Mod: VID | Performed by: UROLOGY

## 2023-04-18 NOTE — NURSING NOTE
Is the patient currently in the state of [place]? YES    Visit mode:VIDEO    If the visit is dropped, the patient can be reconnected by: VIDEO VISIT: Text to cell phone: [phone]    Will anyone else be joining the visit? NO      How would you like to obtain your AVS? MyChart    Are changes needed to the allergy or medication list? NO    Reason for visit: Follow Up (Kidney stones)    Pt. Reports pain in back at a level of 5 today.     Bianca Christensen on 4/18/2023 at 9:24 AM

## 2023-04-18 NOTE — PROGRESS NOTES
[facility]   CHIEF COMPLAINT   It was my pleasure to see Ximena Diaz who is a 21 year old female for follow-up of BACK PAIN.      HPI   Ximena Diaz is a very pleasant 22 year old female who presents with a history of right-sided nephrolithiasis.  She underwent prior ureteroscopy in August 2020.  She has been having intermittent bilateral back pain with radiation up and down her spine with radiation down the back of both legs.  She has been in the emergency room a few times with a recent CT scan showing a punctate less than 1 mm right kidney stone and no evidence of ureteral stones.  She did recently have a UTI.    8/2/2022:  She developed some intermittent pain in early July in the RIGHT flank  This worsened on 7/19/22 and resulted in ER visit  She notes this pain is identical to her last stone episode    Her pain did resolve after her last Ureteroscopy   she notes that her pain has been Making work difficult at times    8/9//2022:  Right Ureteroscopy with laser lithotripsy and basket removal of stones    8/16/2022:  Ureteral stent removal    TODAY 4/18/2023:  She has been having issues with back pain - worse on the right side  Pain seems to be worse at night and worse with movement    She was found to have an asymptomatic UTI and was started on Keflex  No change in any symptoms with the antibiotics    PHYSICAL EXAM  Patient is a 22 year old  female   Vitals: not currently breastfeeding.  There is no height or weight on file to calculate BMI.  General Appearance Adult:   Alert, no acute distress, oriented  HENT: throat/mouth:normal, good dentition  Lungs: no respiratory distress, or pursed lip breathing  Heart: No obvious jugular venous distension present  Abdomen: non - distended  Musculoskeltal: extremities normal, no peripheral edema  Skin: no suspicious lesions or rashes  Neuro: Alert, oriented, speech and mentation normal  Psych: affect and mood normal    UA RESULTS:  Recent Labs   Lab Test  04/13/23  1549   COLOR Yellow   APPEARANCE Slightly Cloudy*   URINEGLC Negative   URINEBILI Negative   URINEKETONE Negative   SG 1.025   UBLD Small*   URINEPH 6.0   PROTEIN Negative   UROBILINOGEN 0.2   NITRITE Positive*   LEUKEST Moderate*   RBCU 2-5*   WBCU 10-25*     UCx:   4/13/2023 = >100k E coli    Creatinine   Date Value Ref Range Status   07/19/2022 0.94 0.51 - 0.95 mg/dL Final   04/14/2021 0.81 0.52 - 1.04 mg/dL Final      IMAGING:  All pertinent imaging reviewed:    All imaging studies reviewed by me.  I personally reviewed these imaging films.  A formal report from radiology will follow.    CT ABD/PEL 8/17/21:  FINDINGS:   LOWER CHEST: Normal.     HEPATOBILIARY: Normal.     PANCREAS: Normal.     SPLEEN: Normal.     ADRENAL GLANDS: Normal.     KIDNEYS/BLADDER: Nonobstructing 1 mm stone mid right kidney otherwise normal. No hydronephrosis.     BOWEL: Normal retrocecal appendix. No bowel obstruction.     LYMPH NODES: Normal.     VASCULATURE: Unremarkable.     PELVIC ORGANS: Normal.     MUSCULOSKELETAL: Small fat-containing paraumbilical hernia. Presumed bone islands.                                                                      IMPRESSION:   1.  Nonobstructing 1 mm stone right kidney. No hydronephrosis.  2.  Small fat-containing paraumbilical hernia.    CT ABD/PEL 7/19/22:  FINDINGS:   LOWER CHEST: Normal.     HEPATOBILIARY: Normal.     PANCREAS: Normal.     SPLEEN: Normal.     ADRENAL GLANDS: Normal.     KIDNEYS/BLADDER: 4 mm nonobstructing stone right lower pole, no hydronephrosis. No left-sided stones. Bladder negative.     BOWEL: No obstruction or inflammatory change. Appendix normal.     LYMPH NODES: Normal.     VASCULATURE: Unremarkable.     PELVIC ORGANS: No adnexal lesions. No free fluid.     MUSCULOSKELETAL: Normal.                                                             IMPRESSION:   1.  No definite etiology for symptoms evident. Nothing acute.  2.  There is a 4 mm nonobstructing stone  within right kidney but no ureteral stone or hydronephrosis.    CT ABD/PEL 4/5/2023:  FINDINGS:   LOWER CHEST: No infiltrates or effusions.     HEPATOBILIARY: No significant mass or bile duct dilatation. No  calcified gallstones.      PANCREAS: No significant mass, duct dilatation, or inflammatory  change.     SPLEEN: Normal size.     ADRENAL GLANDS: No significant nodules.     KIDNEYS/BLADDER: 1 to 2 mm nonobstructing stone in the upper left  kidney not present previously. Faint possible upper pole stone on the  right. Previously seen lower pole stone on the right is no longer  demonstrated. No ureteral stones or hydronephrosis. No bladder stone.     BOWEL: No obstruction or inflammatory change.     VASCULATURE: No abdominal aortic aneurysm.     PELVIC ORGANS: No pelvic masses.     OTHER: No free air or free fluid.     MUSCULOSKELETAL: No suspicious bony lesions.                                                                   IMPRESSION:   1.  Tiny nonobstructing stones in both kidneys, no ureteral stones or  hydronephrosis.  2.  Otherwise negative exam.    ASSESSMENT and PLAN  22-year-old female with history of nephrolithiasis and with right-sided flank pain    Bilateral low back and flank pain  - I reviewed her labs which were notable for normal serum creatinine in July  - I reviewed her urinalysis and urine culture which did indicate a urinary tract infection.  She has been started on antibiotics appropriately.  She was asymptomatic from this UTI  - I reviewed her recent CT scan and reviewed these images personally.  She has a single small 1 to 2 mm stone in the left kidney and no abnormalities found in the right kidney.  There is no evidence of hydronephrosis or outflow obstruction from the kidney to suggest any stricture formation  - We discussed that based on the CT scan I do not see any indication that her urinary tract or kidneys would be contributing or causing her pain  - She may follow-up with me on a  as needed basis      Time spent: 20 minutes spent on the date of the encounter doing chart review, history and exam, documentation and further activities as noted above.    Chago Fontanez MD   Urology  [facility]  [facility] Phone: [phone]  [facility] Phone: [phone]    Virtual Visit Details    Type of service:  Video Visit     Start time:9:35 AM    End time: 9:46 AM    Originating Location (pt. Location): Home    Distant Location (provider location):  On-site  Platform used for Video Visit: [employer]

## 2023-04-18 NOTE — Clinical Note
Hi Mr. Holley,  I followed up with [place] today to review her recent CT scan.  She has been having bilateral low back and flank pain which is worse on the right side.  Her CT scan looks good with respect to her kidneys and I do not see any abnormalities that would be causing her pain from the kidneys or ureters.  I advised her to reach out to your office to discuss other etiologies for her pain and other possible work-up.  Thanks,  Chago Fontanez M.D. Cell: [phone]

## 2023-04-25 ENCOUNTER — NURSE TRIAGE (OUTPATIENT)
Dept: NURSING | Facility: CLINIC | Age: 23
End: 2023-04-25
Payer: COMMERCIAL

## 2023-04-25 DIAGNOSIS — R35.0 URINARY FREQUENCY: ICD-10-CM

## 2023-04-25 DIAGNOSIS — R10.9 FLANK PAIN: ICD-10-CM

## 2023-04-25 DIAGNOSIS — R39.15 URGENCY OF URINATION: ICD-10-CM

## 2023-04-25 DIAGNOSIS — N20.0 KIDNEY STONE: ICD-10-CM

## 2023-04-25 DIAGNOSIS — Z53.9 ERRONEOUS ENCOUNTER--DISREGARD: Primary | ICD-10-CM

## 2023-04-25 NOTE — TELEPHONE ENCOUNTER
tamsulosin (FLOMAX) 0.4 MG capsule    Alpha Blockers Passed     4/18/2023  [facility] Chago Topete MD  Urology

## 2023-04-26 NOTE — TELEPHONE ENCOUNTER
Attempted to reach pt.  Left detailed message to call clinic back at [phone].   To see if pt is still taking this medication.     Ananya Chi, MSN RN

## 2023-04-28 RX ORDER — TAMSULOSIN HYDROCHLORIDE 0.4 MG/1
0.4 CAPSULE ORAL DAILY
Qty: 90 CAPSULE | Refills: 3 | OUTPATIENT
Start: 2023-04-28

## 2023-06-05 NOTE — TELEPHONE ENCOUNTER
Per 9/23/20 office notes, patient now overdue for follow up on anxiety.  Routing to provider to advise.  Wendy LARSENN, RN         Cooperative superficially cooperative, minimizing

## 2023-09-02 DIAGNOSIS — F41.9 ANXIETY: ICD-10-CM

## 2023-09-05 RX ORDER — SERTRALINE HYDROCHLORIDE 25 MG/1
TABLET, FILM COATED ORAL
Qty: 90 TABLET | Refills: 0 | Status: SHIPPED | OUTPATIENT
Start: 2023-09-05

## 2023-10-13 ENCOUNTER — MYC MEDICAL ADVICE (OUTPATIENT)
Dept: UROLOGY | Facility: CLINIC | Age: 23
End: 2023-10-13
Payer: COMMERCIAL

## 2023-10-23 NOTE — TELEPHONE ENCOUNTER
Chago Fontanez MD  [facility] [place]2 days ago     BH  Hi team,    I reviewed the CT report she attached. This is not much different from her CT in April and does not indicate any concerning findings. She has very tiny stones in each kidney, but these are highly unlikely to be causing any problems.    Nothing else needed from my standpoint.    Thanks,  Chago Fontanez M.D.       Received message from Dr. Huseyin Rivera LPN on 10/23/2023 at 8:47 AM

## 2023-11-09 ENCOUNTER — MYC MEDICAL ADVICE (OUTPATIENT)
Dept: FAMILY MEDICINE | Facility: CLINIC | Age: 23
End: 2023-11-09
Payer: COMMERCIAL

## 2024-01-30 ENCOUNTER — VIRTUAL VISIT (OUTPATIENT)
Dept: FAMILY MEDICINE | Facility: CLINIC | Age: 24
End: 2024-01-30
Payer: COMMERCIAL

## 2024-01-30 DIAGNOSIS — G47.00 INSOMNIA, UNSPECIFIED TYPE: Primary | ICD-10-CM

## 2024-01-30 DIAGNOSIS — F41.9 ANXIETY: ICD-10-CM

## 2024-01-30 PROCEDURE — 96127 BRIEF EMOTIONAL/BEHAV ASSMT: CPT | Mod: 95 | Performed by: PHYSICIAN ASSISTANT

## 2024-01-30 PROCEDURE — 99213 OFFICE O/P EST LOW 20 MIN: CPT | Mod: 95 | Performed by: PHYSICIAN ASSISTANT

## 2024-01-30 RX ORDER — HYDROXYZINE PAMOATE 25 MG/1
25-50 CAPSULE ORAL
Qty: 30 CAPSULE | Refills: 0 | Status: SHIPPED | OUTPATIENT
Start: 2024-01-30

## 2024-01-30 ASSESSMENT — ANXIETY QUESTIONNAIRES
8. IF YOU CHECKED OFF ANY PROBLEMS, HOW DIFFICULT HAVE THESE MADE IT FOR YOU TO DO YOUR WORK, TAKE CARE OF THINGS AT HOME, OR GET ALONG WITH OTHER PEOPLE?: NOT DIFFICULT AT ALL
7. FEELING AFRAID AS IF SOMETHING AWFUL MIGHT HAPPEN: NOT AT ALL
GAD7 TOTAL SCORE: 8
6. BECOMING EASILY ANNOYED OR IRRITABLE: SEVERAL DAYS
7. FEELING AFRAID AS IF SOMETHING AWFUL MIGHT HAPPEN: NOT AT ALL
2. NOT BEING ABLE TO STOP OR CONTROL WORRYING: SEVERAL DAYS
5. BEING SO RESTLESS THAT IT IS HARD TO SIT STILL: SEVERAL DAYS
1. FEELING NERVOUS, ANXIOUS, OR ON EDGE: SEVERAL DAYS
IF YOU CHECKED OFF ANY PROBLEMS ON THIS QUESTIONNAIRE, HOW DIFFICULT HAVE THESE PROBLEMS MADE IT FOR YOU TO DO YOUR WORK, TAKE CARE OF THINGS AT HOME, OR GET ALONG WITH OTHER PEOPLE: NOT DIFFICULT AT ALL
GAD7 TOTAL SCORE: 8
3. WORRYING TOO MUCH ABOUT DIFFERENT THINGS: SEVERAL DAYS
4. TROUBLE RELAXING: NEARLY EVERY DAY

## 2024-01-30 NOTE — PROGRESS NOTES
Negrita is a 23 year old who is being evaluated via a billable video visit.      How would you like to obtain your AVS? [employer]  If the video visit is dropped, the invitation should be resent by: Text to cell phone: [phone]  Will anyone else be joining your video visit? No          Instructions Relayed to Patient by Virtual Roomer:       Reminded patient to ensure they were logged on to virtual visit by arrival time listed. Documented in appointment notes if patient had flexibility to initiate visit sooner than arrival time. If pediatric virtual visit, ensured pediatric patient along with parent/guardian will be present for video visit.     Patient offered the website [email or web address] and/or phone number to "[employer]" Help line: [phone]     Assessment & Plan       ICD-10-CM    1. Insomnia, unspecified type  G47.00 hydrOXYzine ana cristina (VISTARIL) 25 MG capsule      2. Anxiety  F41.9       Talk to patient daughter concerns we will add hydroxyzine to use as needed.  Warning signs side effects were discussed recheck in 4 weeks as needed.      Subjective   Negrita is a 23 year old who struggles with anxiety, presenting for the following health issues:  Insomnia        1/30/2024     3:29 PM   Additional Questions   Roomed by Kiana         1/30/2024     3:29 PM   Patient Reported Additional Medications   Patient reports taking the following new medications none     HPI     Insomnia  Onset/Duration: 4-6 weeks ago  Description:   Frequency of insomnia:  nightly  Time to fall asleep (sleep latency): Doesn't fall asleep until the morning, sometimes stays awake for 2 days takes a nap for 30-40 minutes and is up again for a day   Middle of night awakening:  YES  Early morning awakening:  YES  Progression of Symptoms:  same  Accompanying Signs & Symptoms:  Daytime sleepiness/napping: YES  Excessive snoring/apnea: No  Restless legs: YES  Waking to urinate: YES  Chronic pain:  No  Depression symptoms (if yes,  do PHQ9): No  Anxiety symptoms (if yes, do GEORGINA-7): YES  History:  Prior Insomnia: No  New stressful situation: No  Precipitating factors:   Caffeine intake: YES  OTC decongestants: No  Any new medications: No  Alleviating factors:  Self medicating (alcohol, etc.):  melatonin  Stress-reduction (exercise, yoga, meditation etc): No  Therapies tried and outcome:   Over 4 wks or troubles getting to sleep.   Works night shift. No known cause.   She is on sertraline for anxiety and she feels that that is working well up.  She has been working on good bed routine and hygiene.    Review of Systems  Constitutional, HEENT, cardiovascular, pulmonary, gi and gu systems are negative, except as otherwise noted.      Objective           Vitals:  No vitals were obtained today due to virtual visit.    Physical Exam   GENERAL: alert and no distress  EYES: Eyes grossly normal to inspection.  No discharge or erythema, or obvious scleral/conjunctival abnormalities.  RESP: No audible wheeze, cough, or visible cyanosis.    SKIN: Visible skin clear. No significant rash, abnormal pigmentation or lesions.  NEURO: Cranial nerves grossly intact.  Mentation and speech appropriate for age.  PSYCH: Appropriate affect, tone, and pace of words        Video-Visit Details    Type of service:  Video Visit     Originating Location (pt. Location): Home    Distant Location (provider location):  Off-site  Platform used for Video Visit: [employer]  Signed Electronically by: Curtis Holley PA-C     Answers submitted by the patient for this visit:  GEORGINA-7 (Submitted on 1/30/2024)  GEORGINA 7 TOTAL SCORE: 8

## 2024-06-16 ENCOUNTER — HEALTH MAINTENANCE LETTER (OUTPATIENT)
Age: 24
End: 2024-06-16

## 2025-06-21 ENCOUNTER — HEALTH MAINTENANCE LETTER (OUTPATIENT)
Age: 25
End: 2025-06-21

## (undated) DEVICE — SOL WATER IRRIG 1000ML BOTTLE 07139-09

## (undated) DEVICE — CATH URETERAL DL 6FR FLEX-TIP 10FRX50CM G17323 AQ-022610

## (undated) DEVICE — ESU GROUND PAD ADULT W/CORD E7507

## (undated) DEVICE — SYR 30ML LL W/O NDL

## (undated) DEVICE — PACK SET-UP STD 9102

## (undated) DEVICE — SPECIMEN CONTAINER 4OZ

## (undated) DEVICE — GOWN XLG DISP 9545

## (undated) DEVICE — GUIDEWIRE SENSOR DUAL FLEX STR 0.035"X150CM M0066703080

## (undated) DEVICE — GLOVE PROTEXIS W/NEU-THERA 7.5  2D73TE75

## (undated) DEVICE — DRAPE C-ARM 60X42" 1013

## (undated) DEVICE — CATH BALLOON DILATATION UROMAX ULTRA 18FRX4CM M0062251020

## (undated) DEVICE — KIT ENDO FIRST STEP DISINFECTANT 200ML W/POUCH EP-4

## (undated) DEVICE — Device

## (undated) DEVICE — CATH INTERMITTENT CLEAN-CATH 8FR 16" VINYL LF 421708

## (undated) DEVICE — TUBING SUCTION 10'X3/16" N510

## (undated) DEVICE — PUMP SYSTEM SINGLE ACTION M0067201000

## (undated) DEVICE — SHEATH URETERAL ACCESS NAVIGATOR 11/13FRX36CM 250-203

## (undated) DEVICE — SUCTION CANISTER MEDIVAC LINER 1500ML W/LID 65651-515

## (undated) DEVICE — BAG URINARY DRAIN 2000ML LF 154002

## (undated) DEVICE — ENDO SEAL BX PORT BPS-A

## (undated) DEVICE — MARKER SKIN DOUBLE TIP W/FLEXI-RULER W/LABELS

## (undated) DEVICE — DRSG TELFA 2X3"

## (undated) DEVICE — ESU PENCIL W/HOLSTER

## (undated) DEVICE — GUIDEWIRE AMPLATZ 0.035"X145CM  SUPER STIFF 640-104

## (undated) DEVICE — SOL WATER INJ 2000ML BAG 07118-07

## (undated) DEVICE — DRAPE SHEET HALF 40X60" 9358

## (undated) DEVICE — SUCTION TIP YANKAUER W/O VENT K86

## (undated) DEVICE — BASIN SET MINOR DISP

## (undated) DEVICE — ANTIFOG SOLUTION W/FOAM PAD CF-1001

## (undated) DEVICE — SYR EAR BULB 3OZ 0035830

## (undated) DEVICE — ESU ELEC NDL 1" COATED/INSULATED E1465

## (undated) DEVICE — ESU SUCTION CAUTERY 10FR FOOT CONTROL E2505-10FR

## (undated) RX ORDER — GABAPENTIN 300 MG/1
CAPSULE ORAL
Status: DISPENSED
Start: 2020-08-25

## (undated) RX ORDER — FENTANYL CITRATE 50 UG/ML
INJECTION, SOLUTION INTRAMUSCULAR; INTRAVENOUS
Status: DISPENSED
Start: 2022-08-09

## (undated) RX ORDER — ACETAMINOPHEN 325 MG/1
TABLET ORAL
Status: DISPENSED
Start: 2020-08-25

## (undated) RX ORDER — OXYCODONE HYDROCHLORIDE 5 MG/1
TABLET ORAL
Status: DISPENSED
Start: 2019-03-25

## (undated) RX ORDER — PROPOFOL 10 MG/ML
INJECTION, EMULSION INTRAVENOUS
Status: DISPENSED
Start: 2022-08-09

## (undated) RX ORDER — DEXAMETHASONE SODIUM PHOSPHATE 4 MG/ML
INJECTION, SOLUTION INTRA-ARTICULAR; INTRALESIONAL; INTRAMUSCULAR; INTRAVENOUS; SOFT TISSUE
Status: DISPENSED
Start: 2020-08-25

## (undated) RX ORDER — PROPOFOL 10 MG/ML
INJECTION, EMULSION INTRAVENOUS
Status: DISPENSED
Start: 2020-08-25

## (undated) RX ORDER — ONDANSETRON 2 MG/ML
INJECTION INTRAMUSCULAR; INTRAVENOUS
Status: DISPENSED
Start: 2022-08-09

## (undated) RX ORDER — DEXAMETHASONE SODIUM PHOSPHATE 4 MG/ML
INJECTION, SOLUTION INTRA-ARTICULAR; INTRALESIONAL; INTRAMUSCULAR; INTRAVENOUS; SOFT TISSUE
Status: DISPENSED
Start: 2019-03-25

## (undated) RX ORDER — BUPIVACAINE HYDROCHLORIDE 2.5 MG/ML
INJECTION, SOLUTION INFILTRATION; PERINEURAL
Status: DISPENSED
Start: 2019-03-25

## (undated) RX ORDER — SCOLOPAMINE TRANSDERMAL SYSTEM 1 MG/1
PATCH, EXTENDED RELEASE TRANSDERMAL
Status: DISPENSED
Start: 2022-08-09

## (undated) RX ORDER — CEFAZOLIN SODIUM 1 G/3ML
INJECTION, POWDER, FOR SOLUTION INTRAMUSCULAR; INTRAVENOUS
Status: DISPENSED
Start: 2022-08-09

## (undated) RX ORDER — PROPOFOL 10 MG/ML
INJECTION, EMULSION INTRAVENOUS
Status: DISPENSED
Start: 2019-03-25

## (undated) RX ORDER — ACETAMINOPHEN 325 MG/1
TABLET ORAL
Status: DISPENSED
Start: 2022-08-09

## (undated) RX ORDER — DEXMEDETOMIDINE HYDROCHLORIDE 100 UG/ML
INJECTION, SOLUTION INTRAVENOUS
Status: DISPENSED
Start: 2019-03-25

## (undated) RX ORDER — ONDANSETRON 2 MG/ML
INJECTION INTRAMUSCULAR; INTRAVENOUS
Status: DISPENSED
Start: 2019-03-25

## (undated) RX ORDER — KETOROLAC TROMETHAMINE 30 MG/ML
INJECTION, SOLUTION INTRAMUSCULAR; INTRAVENOUS
Status: DISPENSED
Start: 2020-08-25

## (undated) RX ORDER — ACETAMINOPHEN 325 MG/1
TABLET ORAL
Status: DISPENSED
Start: 2019-03-25

## (undated) RX ORDER — FENTANYL CITRATE 50 UG/ML
INJECTION, SOLUTION INTRAMUSCULAR; INTRAVENOUS
Status: DISPENSED
Start: 2019-03-25

## (undated) RX ORDER — ACETAMINOPHEN 10 MG/ML
INJECTION, SOLUTION INTRAVENOUS
Status: DISPENSED
Start: 2019-03-25

## (undated) RX ORDER — FENTANYL CITRATE 50 UG/ML
INJECTION, SOLUTION INTRAMUSCULAR; INTRAVENOUS
Status: DISPENSED
Start: 2020-08-25

## (undated) RX ORDER — LIDOCAINE HYDROCHLORIDE 20 MG/ML
INJECTION, SOLUTION INFILTRATION; PERINEURAL
Status: DISPENSED
Start: 2022-08-09

## (undated) RX ORDER — GABAPENTIN 300 MG/1
CAPSULE ORAL
Status: DISPENSED
Start: 2019-03-25

## (undated) RX ORDER — FUROSEMIDE 10 MG/ML
INJECTION INTRAMUSCULAR; INTRAVENOUS
Status: DISPENSED
Start: 2020-08-25

## (undated) RX ORDER — ONDANSETRON 2 MG/ML
INJECTION INTRAMUSCULAR; INTRAVENOUS
Status: DISPENSED
Start: 2020-08-25

## (undated) RX ORDER — DEXAMETHASONE SODIUM PHOSPHATE 4 MG/ML
INJECTION, SOLUTION INTRA-ARTICULAR; INTRALESIONAL; INTRAMUSCULAR; INTRAVENOUS; SOFT TISSUE
Status: DISPENSED
Start: 2022-08-09